# Patient Record
Sex: FEMALE | Race: OTHER | Employment: UNEMPLOYED | URBAN - METROPOLITAN AREA
[De-identification: names, ages, dates, MRNs, and addresses within clinical notes are randomized per-mention and may not be internally consistent; named-entity substitution may affect disease eponyms.]

---

## 2017-04-04 ENCOUNTER — HOSPITAL ENCOUNTER (OUTPATIENT)
Age: 27
Setting detail: SPECIMEN
Discharge: HOME | End: 2017-04-04
Payer: MEDICARE

## 2017-04-04 ENCOUNTER — OFFICE VISIT (OUTPATIENT)
Dept: OBGYN CLINIC | Age: 27
End: 2017-04-04
Payer: MEDICAID

## 2017-04-04 VITALS
HEIGHT: 61 IN | DIASTOLIC BLOOD PRESSURE: 82 MMHG | SYSTOLIC BLOOD PRESSURE: 123 MMHG | WEIGHT: 178 LBS | HEART RATE: 82 BPM | BODY MASS INDEX: 33.61 KG/M2

## 2017-04-04 DIAGNOSIS — Z32.01 POSITIVE BLOOD PREGNANCY TEST: ICD-10-CM

## 2017-04-04 DIAGNOSIS — N91.2 AMENORRHEA: ICD-10-CM

## 2017-04-04 DIAGNOSIS — N91.2 AMENORRHEA: Primary | ICD-10-CM

## 2017-04-04 LAB
ABO/RH: NORMAL
ABSOLUTE EOS #: 0.1 K/UL (ref 0–0.4)
ABSOLUTE LYMPH #: 2 K/UL (ref 1–4.8)
ABSOLUTE MONO #: 0.5 K/UL (ref 0.1–1.2)
AMPHETAMINE SCREEN URINE: NEGATIVE
ANTIBODY SCREEN: NEGATIVE
BARBITURATE SCREEN URINE: NEGATIVE
BASOPHILS # BLD: 0 % (ref 0–2)
BASOPHILS ABSOLUTE: 0 K/UL (ref 0–0.2)
BENZODIAZEPINE SCREEN, URINE: NEGATIVE
BILIRUBIN URINE: NEGATIVE
BUPRENORPHINE URINE: NORMAL
CANNABINOID SCREEN URINE: NEGATIVE
COCAINE METABOLITE, URINE: NEGATIVE
COLOR: YELLOW
COMMENT UA: NORMAL
DIFFERENTIAL TYPE: ABNORMAL
DIRECT EXAM: NORMAL
EOSINOPHILS RELATIVE PERCENT: 1 % (ref 1–4)
GLUCOSE URINE: NEGATIVE
HCT VFR BLD CALC: 34 % (ref 36–46)
HEMOGLOBIN: 11.1 G/DL (ref 12–16)
HEPATITIS B SURFACE ANTIGEN: NONREACTIVE
HIV AG/AB: NONREACTIVE
KETONES, URINE: NEGATIVE
LEUKOCYTE ESTERASE, URINE: NEGATIVE
LYMPHOCYTES # BLD: 24 % (ref 24–44)
Lab: NORMAL
MCH RBC QN AUTO: 25 PG (ref 26–34)
MCHC RBC AUTO-ENTMCNC: 32.6 G/DL (ref 31–37)
MCV RBC AUTO: 76.6 FL (ref 80–100)
MDMA URINE: NORMAL
METHADONE SCREEN, URINE: NEGATIVE
METHAMPHETAMINE, URINE: NORMAL
MONOCYTES # BLD: 6 % (ref 2–11)
NITRITE, URINE: NEGATIVE
OPIATES, URINE: NEGATIVE
OXYCODONE SCREEN URINE: NEGATIVE
PDW BLD-RTO: 16.9 % (ref 12.5–15.4)
PH UA: 7 (ref 5–8)
PHENCYCLIDINE, URINE: NEGATIVE
PLATELET # BLD: 298 K/UL (ref 140–450)
PLATELET ESTIMATE: ABNORMAL
PMV BLD AUTO: 8.3 FL (ref 6–12)
PROPOXYPHENE, URINE: NORMAL
PROTEIN UA: NEGATIVE
RBC # BLD: 4.44 M/UL (ref 4–5.2)
RBC # BLD: ABNORMAL 10*6/UL
RUBV IGG SER QL: 130.9 IU/ML
SEG NEUTROPHILS: 69 % (ref 36–66)
SEGMENTED NEUTROPHILS ABSOLUTE COUNT: 5.7 K/UL (ref 1.8–7.7)
SPECIFIC GRAVITY UA: 1.02 (ref 1–1.03)
SPECIMEN DESCRIPTION: NORMAL
STATUS: NORMAL
T. PALLIDUM, IGG: NONREACTIVE
TEST INFORMATION: NORMAL
TRICYCLIC ANTIDEPRESSANTS, UR: NORMAL
TURBIDITY: CLEAR
URINE HGB: NEGATIVE
UROBILINOGEN, URINE: NORMAL
WBC # BLD: 8.3 K/UL (ref 3.5–11)
WBC # BLD: ABNORMAL 10*3/UL

## 2017-04-04 PROCEDURE — 99213 OFFICE O/P EST LOW 20 MIN: CPT | Performed by: OBSTETRICS & GYNECOLOGY

## 2017-04-04 RX ORDER — METFORMIN HYDROCHLORIDE 500 MG/1
TABLET, EXTENDED RELEASE ORAL
COMMUNITY
Start: 2017-02-28 | End: 2017-04-04 | Stop reason: ALTCHOICE

## 2017-04-04 RX ORDER — LETROZOLE 2.5 MG/1
TABLET, FILM COATED ORAL
COMMUNITY
Start: 2017-02-28 | End: 2017-04-04 | Stop reason: ALTCHOICE

## 2017-04-04 RX ORDER — PROMETHAZINE HYDROCHLORIDE 12.5 MG/1
12.5 TABLET ORAL EVERY 6 HOURS PRN
Qty: 30 TABLET | Refills: 0 | Status: SHIPPED | OUTPATIENT
Start: 2017-04-04 | End: 2017-11-02

## 2017-04-04 RX ORDER — MELATONIN
COMMUNITY
Start: 2017-02-28

## 2017-04-04 RX ORDER — PRENATAL VIT W/ FE FUMARATE-FA TAB 28-1 MG 28 MG-1 MG
TAB ORAL
COMMUNITY
Start: 2017-03-14

## 2017-04-04 RX ORDER — ACETAMINOPHEN 500 MG
1000 TABLET ORAL EVERY 6 HOURS PRN
Qty: 60 TABLET | Refills: 1 | Status: SHIPPED | OUTPATIENT
Start: 2017-04-04 | End: 2022-09-13 | Stop reason: ALTCHOICE

## 2017-04-05 ENCOUNTER — TELEPHONE (OUTPATIENT)
Dept: OBGYN CLINIC | Age: 27
End: 2017-04-05

## 2017-04-05 DIAGNOSIS — N30.00 ACUTE CYSTITIS WITHOUT HEMATURIA: Primary | ICD-10-CM

## 2017-04-05 RX ORDER — NITROFURANTOIN 25; 75 MG/1; MG/1
100 CAPSULE ORAL 2 TIMES DAILY
Qty: 10 CAPSULE | Refills: 0 | Status: SHIPPED | OUTPATIENT
Start: 2017-04-05 | End: 2017-04-12

## 2017-04-06 LAB
C TRACH DNA GENITAL QL NAA+PROBE: NEGATIVE
CULTURE: ABNORMAL
CYTOLOGY REPORT: NORMAL
Lab: ABNORMAL
N. GONORRHOEAE DNA: NEGATIVE
ORGANISM: ABNORMAL
ORGANISM: ABNORMAL
SPECIMEN DESCRIPTION: ABNORMAL
STATUS: ABNORMAL

## 2017-04-13 ENCOUNTER — HOSPITAL ENCOUNTER (OUTPATIENT)
Dept: ULTRASOUND IMAGING | Age: 27
Discharge: HOME | End: 2017-04-13
Payer: MEDICARE

## 2017-04-13 DIAGNOSIS — N91.2 AMENORRHEA: ICD-10-CM

## 2017-04-13 DIAGNOSIS — Z32.01 POSITIVE BLOOD PREGNANCY TEST: ICD-10-CM

## 2017-04-13 PROCEDURE — 76801 OB US < 14 WKS SINGLE FETUS: CPT

## 2017-04-25 ENCOUNTER — INITIAL PRENATAL (OUTPATIENT)
Dept: OBGYN CLINIC | Age: 27
End: 2017-04-25
Payer: MEDICARE

## 2017-04-25 ENCOUNTER — HOSPITAL ENCOUNTER (OUTPATIENT)
Age: 27
Setting detail: SPECIMEN
Discharge: HOME | End: 2017-04-25
Payer: MEDICARE

## 2017-04-25 ENCOUNTER — TELEPHONE (OUTPATIENT)
Dept: FAMILY MEDICINE CLINIC | Age: 27
End: 2017-04-25

## 2017-04-25 VITALS
BODY MASS INDEX: 31.91 KG/M2 | HEART RATE: 98 BPM | WEIGHT: 169 LBS | SYSTOLIC BLOOD PRESSURE: 121 MMHG | DIASTOLIC BLOOD PRESSURE: 77 MMHG

## 2017-04-25 DIAGNOSIS — Z34.01 ENCOUNTER FOR SUPERVISION OF NORMAL FIRST PREGNANCY IN FIRST TRIMESTER: Primary | ICD-10-CM

## 2017-04-25 DIAGNOSIS — Z83.3 FAMILY HISTORY OF DIABETES MELLITUS TYPE II: ICD-10-CM

## 2017-04-25 DIAGNOSIS — Z98.891 HISTORY OF CESAREAN SECTION, LOW TRANSVERSE: ICD-10-CM

## 2017-04-25 DIAGNOSIS — O31.10X0 VANISHING TWIN SYNDROME: ICD-10-CM

## 2017-04-25 DIAGNOSIS — O99.810 ABNORMAL GLUCOSE TOLERANCE TEST (GTT) DURING PREGNANCY, ANTEPARTUM: Primary | ICD-10-CM

## 2017-04-25 LAB
GLUCOSE ADMINISTRATION: ABNORMAL
GLUCOSE TOLERANCE SCREEN 50G: 188 MG/DL (ref 70–135)

## 2017-04-25 PROCEDURE — 99213 OFFICE O/P EST LOW 20 MIN: CPT | Performed by: OBSTETRICS & GYNECOLOGY

## 2017-04-25 PROCEDURE — H1000 PRENATAL CARE ATRISK ASSESSM: HCPCS | Performed by: OBSTETRICS & GYNECOLOGY

## 2017-04-27 LAB
AFP INTERPRETATION: NORMAL
AFP MOM: NORMAL
AFP SPECIMEN: NORMAL
AFP: 14 NG/ML
DATE OF BIRTH: NORMAL
DATING METHOD: NORMAL
DETERMINED BY: NORMAL
DIABETIC: NO
DUE DATE: NORMAL
ESTIMATED DUE DATE: NORMAL
FAMILY HISTORY NTD: NORMAL
GESTATIONAL AGE: 10.71 WEEKS
HISTORY OF ANEUPLOIDY?: NO
INSULIN REQ DIABETES: NO
LAST MENSTRUAL PERIOD: NORMAL
MATERNAL AGE AT EDD: 29.7 YR
MATERNAL WEIGHT: 169
NUMBER OF FETUSES: NORMAL
PATIENT WEIGHT: 169 LBS
PHYSICIAN: NORMAL
RACE (MATERNAL): NORMAL
RACE: NORMAL
ZZ NTE CLEAN UP: HISTORY: NORMAL

## 2017-05-02 ENCOUNTER — ROUTINE PRENATAL (OUTPATIENT)
Dept: PERINATAL CARE | Age: 27
End: 2017-05-02
Payer: MEDICARE

## 2017-05-02 VITALS
HEART RATE: 92 BPM | WEIGHT: 168 LBS | BODY MASS INDEX: 31.72 KG/M2 | RESPIRATION RATE: 16 BRPM | DIASTOLIC BLOOD PRESSURE: 74 MMHG | TEMPERATURE: 97.9 F | SYSTOLIC BLOOD PRESSURE: 140 MMHG

## 2017-05-02 DIAGNOSIS — O99.211 OBESITY COMPLICATING PREGNANCY, FIRST TRIMESTER: ICD-10-CM

## 2017-05-02 DIAGNOSIS — O36.80X0 EXAMINATION TO DETERMINE FETAL VIABILITY OF PREGNANCY, NOT APPLICABLE OR UNSPECIFIED FETUS: ICD-10-CM

## 2017-05-02 DIAGNOSIS — Z36.9 FIRST TRIMESTER SCREENING: Primary | ICD-10-CM

## 2017-05-02 DIAGNOSIS — O34.219 PREVIOUS CESAREAN DELIVERY, ANTEPARTUM CONDITION OR COMPLICATION: ICD-10-CM

## 2017-05-02 DIAGNOSIS — O99.810 ABNORMAL MATERNAL GLUCOSE TOLERANCE, ANTEPARTUM: Primary | ICD-10-CM

## 2017-05-02 DIAGNOSIS — Z3A.11 11 WEEKS GESTATION OF PREGNANCY: ICD-10-CM

## 2017-05-02 DIAGNOSIS — O24.119 PRE-EXISTING TYPE 2 DIABETES MELLITUS DURING PREGNANCY, ANTEPARTUM: ICD-10-CM

## 2017-05-02 PROBLEM — O99.210 OBESITY COMPLICATING PREGNANCY: Status: ACTIVE | Noted: 2017-05-02

## 2017-05-02 PROCEDURE — 76813 OB US NUCHAL MEAS 1 GEST: CPT | Performed by: OBSTETRICS & GYNECOLOGY

## 2017-05-02 PROCEDURE — 76801 OB US < 14 WKS SINGLE FETUS: CPT | Performed by: OBSTETRICS & GYNECOLOGY

## 2017-05-03 ENCOUNTER — TELEPHONE (OUTPATIENT)
Dept: OBGYN CLINIC | Age: 27
End: 2017-05-03

## 2017-05-10 ENCOUNTER — TELEPHONE (OUTPATIENT)
Dept: OBGYN CLINIC | Age: 27
End: 2017-05-10

## 2017-05-10 ENCOUNTER — HOSPITAL ENCOUNTER (OUTPATIENT)
Age: 27
Setting detail: SPECIMEN
Discharge: HOME | End: 2017-05-10
Payer: MEDICARE

## 2017-05-10 DIAGNOSIS — O99.810 ABNORMAL GLUCOSE TOLERANCE TEST (GTT) DURING PREGNANCY, ANTEPARTUM: ICD-10-CM

## 2017-05-10 LAB
AMOUNT GLUCOSE GIVEN: 100 G
GLUCOSE FASTING: 95 MG/DL (ref 65–99)
GLUCOSE TOLERANCE TEST 1 HOUR: 240 MG/DL (ref 65–184)
GLUCOSE TOLERANCE TEST 2 HOUR: 184 MG/DL (ref 65–139)
GLUCOSE TOLERANCE TEST 3 HOUR: 154 MG/DL (ref 65–130)

## 2017-05-12 ENCOUNTER — TELEPHONE (OUTPATIENT)
Dept: OBGYN CLINIC | Age: 27
End: 2017-05-12

## 2017-05-19 ENCOUNTER — ROUTINE PRENATAL (OUTPATIENT)
Dept: OBGYN CLINIC | Age: 27
End: 2017-05-19
Payer: MEDICARE

## 2017-05-19 VITALS
BODY MASS INDEX: 31.34 KG/M2 | DIASTOLIC BLOOD PRESSURE: 68 MMHG | HEART RATE: 90 BPM | WEIGHT: 166 LBS | SYSTOLIC BLOOD PRESSURE: 104 MMHG

## 2017-05-19 DIAGNOSIS — Z3A.14 14 WEEKS GESTATION OF PREGNANCY: Primary | ICD-10-CM

## 2017-05-19 PROCEDURE — 99213 OFFICE O/P EST LOW 20 MIN: CPT | Performed by: OBSTETRICS & GYNECOLOGY

## 2017-05-19 RX ORDER — FLUCONAZOLE 150 MG/1
150 TABLET ORAL ONCE
Qty: 1 TABLET | Refills: 0 | Status: SHIPPED | OUTPATIENT
Start: 2017-05-19 | End: 2017-05-19

## 2017-05-23 ENCOUNTER — TELEPHONE (OUTPATIENT)
Dept: OBGYN CLINIC | Age: 27
End: 2017-05-23

## 2017-05-23 RX ORDER — FLUCONAZOLE 150 MG/1
150 TABLET ORAL
Qty: 3 TABLET | Refills: 0 | Status: SHIPPED | OUTPATIENT
Start: 2017-05-23 | End: 2017-06-16 | Stop reason: ALTCHOICE

## 2017-05-25 ENCOUNTER — HOSPITAL ENCOUNTER (OUTPATIENT)
Age: 27
Discharge: HOME | End: 2017-05-25
Payer: MEDICARE

## 2017-05-25 LAB
BUN BLDV-MCNC: 4 MG/DL (ref 6–20)
CREAT SERPL-MCNC: 0.36 MG/DL (ref 0.5–0.9)
CREATININE TIMED UR: NORMAL MG/ X H
CREATININE URINE: 30.3 MG/DL
CREATININE, 24H UR: 813 MG/24 H (ref 740–1570)
ESTIMATED AVERAGE GLUCOSE: 108 MG/DL
GFR AFRICAN AMERICAN: >60 ML/MIN
GFR NON-AFRICAN AMERICAN: >60 ML/MIN
GFR SERPL CREATININE-BSD FRML MDRD: ABNORMAL ML/MIN/{1.73_M2}
GFR SERPL CREATININE-BSD FRML MDRD: ABNORMAL ML/MIN/{1.73_M2}
HBA1C MFR BLD: 5.4 % (ref 4–6)
HOURS COLLECTED: 24 H
HOURS COLLECTED: 24 H
PROTEIN 24 HOUR URINE: 376 MG/24 H
PROTEIN,TOT TIMED UR: ABNORMAL MG/X H
URINE TOTAL PROTEIN: 14 MG/DL
VOLUME: 2684 ML
VOLUME: 2684 ML

## 2017-05-25 PROCEDURE — 84520 ASSAY OF UREA NITROGEN: CPT

## 2017-05-25 PROCEDURE — 84156 ASSAY OF PROTEIN URINE: CPT

## 2017-05-25 PROCEDURE — 82570 ASSAY OF URINE CREATININE: CPT

## 2017-05-25 PROCEDURE — 82575 CREATININE CLEARANCE TEST: CPT

## 2017-05-25 PROCEDURE — 36415 COLL VENOUS BLD VENIPUNCTURE: CPT

## 2017-05-25 PROCEDURE — 83036 HEMOGLOBIN GLYCOSYLATED A1C: CPT

## 2017-05-25 PROCEDURE — 82565 ASSAY OF CREATININE: CPT

## 2017-05-26 LAB
CREAT SERPL-MCNC: 0.33 MG/DL (ref 0.5–0.9)
CREATININE CLEARANCE: 178.9 ML/MIN/BSA (ref 71–151)
CREATININE URINE: 31.8 MG/DL (ref 28–217)
LENGTH OF COLLECTION: 24 H
PATIENT HEIGHT: 152 CM
PATIENT WEIGHT: 77 KG
VOLUME: 2684 ML

## 2017-06-05 ENCOUNTER — ROUTINE PRENATAL (OUTPATIENT)
Dept: PERINATAL CARE | Age: 27
End: 2017-06-05
Payer: MEDICARE

## 2017-06-05 VITALS
RESPIRATION RATE: 18 BRPM | SYSTOLIC BLOOD PRESSURE: 109 MMHG | TEMPERATURE: 98.2 F | HEART RATE: 95 BPM | WEIGHT: 164 LBS | BODY MASS INDEX: 30.96 KG/M2 | DIASTOLIC BLOOD PRESSURE: 68 MMHG

## 2017-06-05 DIAGNOSIS — O24.119 PRE-EXISTING TYPE 2 DIABETES MELLITUS DURING PREGNANCY, ANTEPARTUM: Primary | ICD-10-CM

## 2017-06-05 DIAGNOSIS — Z3A.16 16 WEEKS GESTATION OF PREGNANCY: ICD-10-CM

## 2017-06-05 PROCEDURE — 99242 OFF/OP CONSLTJ NEW/EST SF 20: CPT | Performed by: OBSTETRICS & GYNECOLOGY

## 2017-06-05 RX ORDER — ASPIRIN 81 MG/1
81 TABLET, CHEWABLE ORAL DAILY
COMMUNITY
End: 2017-11-02

## 2017-06-13 ENCOUNTER — TELEPHONE (OUTPATIENT)
Dept: PERINATAL CARE | Age: 27
End: 2017-06-13

## 2017-06-16 ENCOUNTER — ROUTINE PRENATAL (OUTPATIENT)
Dept: OBGYN CLINIC | Age: 27
End: 2017-06-16
Payer: MEDICARE

## 2017-06-16 VITALS
SYSTOLIC BLOOD PRESSURE: 99 MMHG | WEIGHT: 167 LBS | DIASTOLIC BLOOD PRESSURE: 67 MMHG | HEART RATE: 98 BPM | BODY MASS INDEX: 31.53 KG/M2

## 2017-06-16 DIAGNOSIS — O24.119 PRE-EXISTING TYPE 2 DIABETES MELLITUS DURING PREGNANCY, ANTEPARTUM: Primary | ICD-10-CM

## 2017-06-16 DIAGNOSIS — Z34.82 ENCOUNTER FOR SUPERVISION OF OTHER NORMAL PREGNANCY IN SECOND TRIMESTER: ICD-10-CM

## 2017-06-16 DIAGNOSIS — R14.1 GAS PAIN: ICD-10-CM

## 2017-06-16 PROCEDURE — 99213 OFFICE O/P EST LOW 20 MIN: CPT | Performed by: OBSTETRICS & GYNECOLOGY

## 2017-06-16 RX ORDER — SIMETHICONE 80 MG
80 TABLET,CHEWABLE ORAL 2 TIMES DAILY PRN
Qty: 180 TABLET | Refills: 0 | Status: SHIPPED | OUTPATIENT
Start: 2017-06-16 | End: 2017-09-29 | Stop reason: SDUPTHER

## 2017-06-22 ENCOUNTER — HOSPITAL ENCOUNTER (OUTPATIENT)
Dept: DIABETES SERVICES | Age: 27
Setting detail: THERAPIES SERIES
Discharge: HOME | End: 2017-06-22
Payer: MEDICARE

## 2017-06-22 VITALS — BODY MASS INDEX: 30.97 KG/M2 | WEIGHT: 164.02 LBS

## 2017-06-22 DIAGNOSIS — O24.410 GESTATIONAL DIABETES MELLITUS, CLASS A1: Primary | ICD-10-CM

## 2017-06-22 PROCEDURE — G0108 DIAB MANAGE TRN  PER INDIV: HCPCS

## 2017-07-05 ENCOUNTER — TELEPHONE (OUTPATIENT)
Dept: PERINATAL CARE | Age: 27
End: 2017-07-05

## 2017-07-06 ENCOUNTER — TELEPHONE (OUTPATIENT)
Dept: PERINATAL CARE | Age: 27
End: 2017-07-06

## 2017-07-18 ENCOUNTER — ROUTINE PRENATAL (OUTPATIENT)
Dept: OBGYN CLINIC | Age: 27
End: 2017-07-18
Payer: MEDICARE

## 2017-07-18 ENCOUNTER — TELEPHONE (OUTPATIENT)
Dept: PERINATAL CARE | Age: 27
End: 2017-07-18

## 2017-07-18 ENCOUNTER — ROUTINE PRENATAL (OUTPATIENT)
Dept: PERINATAL CARE | Age: 27
End: 2017-07-18
Payer: MEDICARE

## 2017-07-18 VITALS
WEIGHT: 165 LBS | DIASTOLIC BLOOD PRESSURE: 60 MMHG | HEART RATE: 94 BPM | BODY MASS INDEX: 31.15 KG/M2 | SYSTOLIC BLOOD PRESSURE: 112 MMHG

## 2017-07-18 VITALS
WEIGHT: 165 LBS | RESPIRATION RATE: 16 BRPM | TEMPERATURE: 98.3 F | BODY MASS INDEX: 31.15 KG/M2 | DIASTOLIC BLOOD PRESSURE: 68 MMHG | HEART RATE: 92 BPM | SYSTOLIC BLOOD PRESSURE: 114 MMHG

## 2017-07-18 DIAGNOSIS — Z3A.22 22 WEEKS GESTATION OF PREGNANCY: ICD-10-CM

## 2017-07-18 DIAGNOSIS — O34.219 PREVIOUS CESAREAN DELIVERY, ANTEPARTUM CONDITION OR COMPLICATION: ICD-10-CM

## 2017-07-18 DIAGNOSIS — O41.8X20: ICD-10-CM

## 2017-07-18 DIAGNOSIS — O24.119 PRE-EXISTING TYPE 2 DIABETES MELLITUS DURING PREGNANCY, ANTEPARTUM: Primary | ICD-10-CM

## 2017-07-18 DIAGNOSIS — O99.212 OBESITY COMPLICATING PREGNANCY, SECOND TRIMESTER: ICD-10-CM

## 2017-07-18 DIAGNOSIS — Z3A.22 22 WEEKS GESTATION OF PREGNANCY: Primary | ICD-10-CM

## 2017-07-18 DIAGNOSIS — Z36.86 ENCOUNTER FOR SCREENING FOR RISK OF PRE-TERM LABOR: ICD-10-CM

## 2017-07-18 PROBLEM — O41.8X90 SUBCHORIONIC HEMATOMA: Status: ACTIVE | Noted: 2017-07-18

## 2017-07-18 PROBLEM — O46.8X9 SUBCHORIONIC HEMATOMA: Status: ACTIVE | Noted: 2017-07-18

## 2017-07-18 PROCEDURE — 76817 TRANSVAGINAL US OBSTETRIC: CPT | Performed by: OBSTETRICS & GYNECOLOGY

## 2017-07-18 PROCEDURE — 76811 OB US DETAILED SNGL FETUS: CPT | Performed by: OBSTETRICS & GYNECOLOGY

## 2017-07-18 PROCEDURE — 99213 OFFICE O/P EST LOW 20 MIN: CPT | Performed by: OBSTETRICS & GYNECOLOGY

## 2017-07-21 ENCOUNTER — TELEPHONE (OUTPATIENT)
Dept: OBGYN CLINIC | Age: 27
End: 2017-07-21

## 2017-07-21 DIAGNOSIS — B37.31 YEAST INFECTION OF THE VAGINA: Primary | ICD-10-CM

## 2017-07-25 ENCOUNTER — HOSPITAL ENCOUNTER (OUTPATIENT)
Dept: DIABETES SERVICES | Age: 27
Setting detail: THERAPIES SERIES
Discharge: HOME | End: 2017-07-25
Payer: MEDICARE

## 2017-07-25 DIAGNOSIS — O24.410 GESTATIONAL DIABETES MELLITUS, CLASS A1: Primary | ICD-10-CM

## 2017-07-25 PROCEDURE — G0108 DIAB MANAGE TRN  PER INDIV: HCPCS

## 2017-08-02 ENCOUNTER — TELEPHONE (OUTPATIENT)
Dept: FAMILY MEDICINE CLINIC | Age: 27
End: 2017-08-02

## 2017-08-02 DIAGNOSIS — B37.31 YEAST INFECTION OF THE VAGINA: Primary | ICD-10-CM

## 2017-08-02 RX ORDER — FLUCONAZOLE 150 MG/1
150 TABLET ORAL ONCE
Qty: 1 TABLET | Refills: 0 | Status: SHIPPED | OUTPATIENT
Start: 2017-08-02 | End: 2017-08-02

## 2017-08-15 ENCOUNTER — ROUTINE PRENATAL (OUTPATIENT)
Dept: PERINATAL CARE | Age: 27
End: 2017-08-15
Payer: MEDICARE

## 2017-08-15 VITALS
HEIGHT: 61 IN | HEART RATE: 87 BPM | WEIGHT: 168 LBS | SYSTOLIC BLOOD PRESSURE: 107 MMHG | DIASTOLIC BLOOD PRESSURE: 65 MMHG | TEMPERATURE: 98.1 F | BODY MASS INDEX: 31.72 KG/M2 | RESPIRATION RATE: 16 BRPM

## 2017-08-15 DIAGNOSIS — O41.8X20: ICD-10-CM

## 2017-08-15 DIAGNOSIS — O99.212 OBESITY COMPLICATING PREGNANCY, SECOND TRIMESTER: ICD-10-CM

## 2017-08-15 DIAGNOSIS — O24.119 PRE-EXISTING TYPE 2 DIABETES MELLITUS DURING PREGNANCY, ANTEPARTUM: Primary | ICD-10-CM

## 2017-08-15 DIAGNOSIS — Z36.4 ANTENATAL SCREENING FOR FETAL GROWTH RETARDATION USING ULTRASONICS: ICD-10-CM

## 2017-08-15 DIAGNOSIS — O34.219 PREVIOUS CESAREAN DELIVERY, ANTEPARTUM CONDITION OR COMPLICATION: ICD-10-CM

## 2017-08-15 DIAGNOSIS — Z3A.26 26 WEEKS GESTATION OF PREGNANCY: ICD-10-CM

## 2017-08-15 PROCEDURE — 76816 OB US FOLLOW-UP PER FETUS: CPT | Performed by: OBSTETRICS & GYNECOLOGY

## 2017-08-15 PROCEDURE — 76827 ECHO EXAM OF FETAL HEART: CPT | Performed by: OBSTETRICS & GYNECOLOGY

## 2017-08-15 PROCEDURE — 76825 ECHO EXAM OF FETAL HEART: CPT | Performed by: OBSTETRICS & GYNECOLOGY

## 2017-08-15 PROCEDURE — 93325 DOPPLER ECHO COLOR FLOW MAPG: CPT | Performed by: OBSTETRICS & GYNECOLOGY

## 2017-08-22 ENCOUNTER — ROUTINE PRENATAL (OUTPATIENT)
Dept: OBGYN CLINIC | Age: 27
End: 2017-08-22
Payer: MEDICARE

## 2017-08-22 VITALS
SYSTOLIC BLOOD PRESSURE: 102 MMHG | DIASTOLIC BLOOD PRESSURE: 67 MMHG | BODY MASS INDEX: 31.55 KG/M2 | HEART RATE: 88 BPM | WEIGHT: 167 LBS

## 2017-08-22 DIAGNOSIS — Z34.92 PRENATAL CARE, SECOND TRIMESTER: Primary | ICD-10-CM

## 2017-08-22 PROCEDURE — 99213 OFFICE O/P EST LOW 20 MIN: CPT | Performed by: OBSTETRICS & GYNECOLOGY

## 2017-08-23 ENCOUNTER — HOSPITAL ENCOUNTER (INPATIENT)
Age: 27
LOS: 1 days | Discharge: HOME | DRG: 566 | End: 2017-08-26
Attending: OBSTETRICS & GYNECOLOGY | Admitting: OBSTETRICS & GYNECOLOGY
Payer: MEDICARE

## 2017-08-23 ENCOUNTER — APPOINTMENT (OUTPATIENT)
Dept: ULTRASOUND IMAGING | Age: 27
DRG: 566 | End: 2017-08-23
Payer: MEDICARE

## 2017-08-23 ENCOUNTER — TELEPHONE (OUTPATIENT)
Dept: OBGYN CLINIC | Age: 27
End: 2017-08-23

## 2017-08-23 PROBLEM — R10.9 LEFT FLANK PAIN: Status: ACTIVE | Noted: 2017-08-23

## 2017-08-23 PROBLEM — O09.92 HIGH-RISK PREGNANCY IN SECOND TRIMESTER: Status: ACTIVE | Noted: 2017-08-23

## 2017-08-23 PROBLEM — R50.9 FEBRILE: Status: ACTIVE | Noted: 2017-08-23

## 2017-08-23 LAB
-: ABNORMAL
ABSOLUTE EOS #: 0 K/UL (ref 0–0.4)
ABSOLUTE LYMPH #: 1 K/UL (ref 1–4.8)
ABSOLUTE MONO #: 0.9 K/UL (ref 0.1–1.2)
ALBUMIN SERPL-MCNC: 3.8 G/DL (ref 3.5–5.2)
ALBUMIN/GLOBULIN RATIO: 1.1 (ref 1–2.5)
ALP BLD-CCNC: 75 U/L (ref 35–104)
ALT SERPL-CCNC: 8 U/L (ref 5–33)
AMORPHOUS: ABNORMAL
ANION GAP SERPL CALCULATED.3IONS-SCNC: 19 MMOL/L (ref 9–17)
AST SERPL-CCNC: 10 U/L
BACTERIA: ABNORMAL
BASOPHILS # BLD: 0 %
BASOPHILS ABSOLUTE: 0 K/UL (ref 0–0.2)
BILIRUB SERPL-MCNC: 0.31 MG/DL (ref 0.3–1.2)
BILIRUBIN URINE: NEGATIVE
BUN BLDV-MCNC: 4 MG/DL (ref 6–20)
BUN/CREAT BLD: ABNORMAL (ref 9–20)
CALCIUM SERPL-MCNC: 9.1 MG/DL (ref 8.6–10.4)
CASTS UA: ABNORMAL /LPF (ref 0–8)
CHLORIDE BLD-SCNC: 99 MMOL/L (ref 98–107)
CO2: 22 MMOL/L (ref 20–31)
COLOR: YELLOW
COMMENT UA: ABNORMAL
CREAT SERPL-MCNC: 0.31 MG/DL (ref 0.5–0.9)
CRYSTALS, UA: ABNORMAL /HPF
DIFFERENTIAL TYPE: ABNORMAL
EOSINOPHILS RELATIVE PERCENT: 0 %
EPITHELIAL CELLS UA: ABNORMAL /HPF (ref 0–5)
GFR AFRICAN AMERICAN: >60 ML/MIN
GFR NON-AFRICAN AMERICAN: >60 ML/MIN
GFR SERPL CREATININE-BSD FRML MDRD: ABNORMAL ML/MIN/{1.73_M2}
GFR SERPL CREATININE-BSD FRML MDRD: ABNORMAL ML/MIN/{1.73_M2}
GLUCOSE BLD-MCNC: 142 MG/DL (ref 65–105)
GLUCOSE BLD-MCNC: 78 MG/DL (ref 70–99)
GLUCOSE URINE: NEGATIVE
HCT VFR BLD CALC: 31.1 % (ref 36–46)
HEMOGLOBIN: 10.4 G/DL (ref 12–16)
KETONES, URINE: NEGATIVE
LEUKOCYTE ESTERASE, URINE: ABNORMAL
LYMPHOCYTES # BLD: 8 %
MCH RBC QN AUTO: 26.5 PG (ref 26–34)
MCHC RBC AUTO-ENTMCNC: 33.3 G/DL (ref 31–37)
MCV RBC AUTO: 79.6 FL (ref 80–100)
MONOCYTES # BLD: 7 %
MUCUS: ABNORMAL
NITRITE, URINE: NEGATIVE
OTHER OBSERVATIONS UA: ABNORMAL
PDW BLD-RTO: 15.2 % (ref 12.5–15.4)
PH UA: 7.5 (ref 5–8)
PLATELET # BLD: 220 K/UL (ref 140–450)
PLATELET ESTIMATE: ABNORMAL
PMV BLD AUTO: 7.5 FL (ref 6–12)
POTASSIUM SERPL-SCNC: 3.6 MMOL/L (ref 3.7–5.3)
PROTEIN UA: NEGATIVE
RBC # BLD: 3.91 M/UL (ref 4–5.2)
RBC # BLD: ABNORMAL 10*6/UL
RBC UA: ABNORMAL /HPF (ref 0–4)
RENAL EPITHELIAL, UA: ABNORMAL /HPF
SEG NEUTROPHILS: 85 %
SEGMENTED NEUTROPHILS ABSOLUTE COUNT: 11.3 K/UL (ref 1.8–7.7)
SODIUM BLD-SCNC: 140 MMOL/L (ref 135–144)
SPECIFIC GRAVITY UA: 1.01 (ref 1–1.03)
TOTAL PROTEIN: 7.2 G/DL (ref 6.4–8.3)
TRICHOMONAS: ABNORMAL
TURBIDITY: CLEAR
URINE HGB: ABNORMAL
UROBILINOGEN, URINE: NORMAL
WBC # BLD: 13.2 K/UL (ref 3.5–11)
WBC # BLD: ABNORMAL 10*3/UL
WBC UA: ABNORMAL /HPF (ref 0–5)
YEAST: ABNORMAL

## 2017-08-23 PROCEDURE — 96365 THER/PROPH/DIAG IV INF INIT: CPT

## 2017-08-23 PROCEDURE — 81001 URINALYSIS AUTO W/SCOPE: CPT

## 2017-08-23 PROCEDURE — 87086 URINE CULTURE/COLONY COUNT: CPT

## 2017-08-23 PROCEDURE — 87088 URINE BACTERIA CULTURE: CPT

## 2017-08-23 PROCEDURE — 2580000003 HC RX 258: Performed by: STUDENT IN AN ORGANIZED HEALTH CARE EDUCATION/TRAINING PROGRAM

## 2017-08-23 PROCEDURE — 87040 BLOOD CULTURE FOR BACTERIA: CPT

## 2017-08-23 PROCEDURE — 82947 ASSAY GLUCOSE BLOOD QUANT: CPT

## 2017-08-23 PROCEDURE — 36415 COLL VENOUS BLD VENIPUNCTURE: CPT

## 2017-08-23 PROCEDURE — 6370000000 HC RX 637 (ALT 250 FOR IP): Performed by: STUDENT IN AN ORGANIZED HEALTH CARE EDUCATION/TRAINING PROGRAM

## 2017-08-23 PROCEDURE — 76770 US EXAM ABDO BACK WALL COMP: CPT

## 2017-08-23 PROCEDURE — 6360000002 HC RX W HCPCS: Performed by: STUDENT IN AN ORGANIZED HEALTH CARE EDUCATION/TRAINING PROGRAM

## 2017-08-23 PROCEDURE — 80053 COMPREHEN METABOLIC PANEL: CPT

## 2017-08-23 PROCEDURE — 87186 SC STD MICRODIL/AGAR DIL: CPT

## 2017-08-23 PROCEDURE — 85025 COMPLETE CBC W/AUTO DIFF WBC: CPT

## 2017-08-23 RX ORDER — NICOTINE POLACRILEX 4 MG
15 LOZENGE BUCCAL PRN
Status: DISCONTINUED | OUTPATIENT
Start: 2017-08-23 | End: 2017-08-26 | Stop reason: HOSPADM

## 2017-08-23 RX ORDER — 0.9 % SODIUM CHLORIDE 0.9 %
1000 INTRAVENOUS SOLUTION INTRAVENOUS ONCE
Status: COMPLETED | OUTPATIENT
Start: 2017-08-23 | End: 2017-08-23

## 2017-08-23 RX ORDER — ACETAMINOPHEN 500 MG
1000 TABLET ORAL ONCE
Status: COMPLETED | OUTPATIENT
Start: 2017-08-23 | End: 2017-08-23

## 2017-08-23 RX ORDER — SODIUM CHLORIDE 9 MG/ML
125 INJECTION, SOLUTION INTRAVENOUS CONTINUOUS
Status: DISCONTINUED | OUTPATIENT
Start: 2017-08-23 | End: 2017-08-24

## 2017-08-23 RX ORDER — DEXTROSE MONOHYDRATE 50 MG/ML
100 INJECTION, SOLUTION INTRAVENOUS PRN
Status: DISCONTINUED | OUTPATIENT
Start: 2017-08-23 | End: 2017-08-26 | Stop reason: HOSPADM

## 2017-08-23 RX ORDER — DEXTROSE MONOHYDRATE 25 G/50ML
12.5 INJECTION, SOLUTION INTRAVENOUS PRN
Status: DISCONTINUED | OUTPATIENT
Start: 2017-08-23 | End: 2017-08-26 | Stop reason: HOSPADM

## 2017-08-23 RX ADMIN — INSULIN HUMAN 12 UNITS: 100 INJECTION, SUSPENSION SUBCUTANEOUS at 21:04

## 2017-08-23 RX ADMIN — CEFTRIAXONE SODIUM 1 G: 1 INJECTION, POWDER, FOR SOLUTION INTRAMUSCULAR; INTRAVENOUS at 19:28

## 2017-08-23 RX ADMIN — SODIUM CHLORIDE 125 ML/HR: 9 INJECTION, SOLUTION INTRAVENOUS at 21:00

## 2017-08-23 RX ADMIN — ACETAMINOPHEN 1000 MG: 500 TABLET ORAL at 19:36

## 2017-08-23 RX ADMIN — SODIUM CHLORIDE 1000 ML: 9 INJECTION, SOLUTION INTRAVENOUS at 18:30

## 2017-08-23 ASSESSMENT — PAIN SCALES - GENERAL: PAINLEVEL_OUTOF10: 3

## 2017-08-23 NOTE — TELEPHONE ENCOUNTER
states that patient is having muscle cramps in her back. Very instense. Feels unable to breath normally while the cramping is occurring. Per Elodia Pereira. She is to go to [email or web address] L/d.

## 2017-08-23 NOTE — TELEPHONE ENCOUNTER
[address] patient refuses to go to hospital states she is not in labor but wants to know what to do I stressed several times she needs to be evaluated   [facility] called back and stated she would now to to ER

## 2017-08-24 ENCOUNTER — APPOINTMENT (OUTPATIENT)
Dept: GENERAL RADIOLOGY | Age: 27
DRG: 566 | End: 2017-08-24
Payer: MEDICARE

## 2017-08-24 LAB
ABSOLUTE EOS #: 0 K/UL (ref 0–0.4)
ABSOLUTE LYMPH #: 1 K/UL (ref 1–4.8)
ABSOLUTE MONO #: 1.2 K/UL (ref 0.1–1.2)
BASOPHILS # BLD: 0 %
BASOPHILS ABSOLUTE: 0 K/UL (ref 0–0.2)
C-REACTIVE PROTEIN: 125.2 MG/L (ref 0–5)
DIFFERENTIAL TYPE: ABNORMAL
EOSINOPHILS RELATIVE PERCENT: 0 %
GLUCOSE BLD-MCNC: 111 MG/DL (ref 65–105)
GLUCOSE BLD-MCNC: 79 MG/DL (ref 65–105)
GLUCOSE BLD-MCNC: 86 MG/DL (ref 65–105)
GLUCOSE BLD-MCNC: 88 MG/DL (ref 65–105)
GLUCOSE BLD-MCNC: 98 MG/DL (ref 65–105)
HCT VFR BLD CALC: 28.8 % (ref 36–46)
HEMOGLOBIN: 9.5 G/DL (ref 12–16)
LACTIC ACID, WHOLE BLOOD: 0.9 MMOL/L (ref 0.7–2.1)
LYMPHOCYTES # BLD: 7 %
MCH RBC QN AUTO: 26.6 PG (ref 26–34)
MCHC RBC AUTO-ENTMCNC: 33 G/DL (ref 31–37)
MCV RBC AUTO: 80.6 FL (ref 80–100)
MONOCYTES # BLD: 8 %
PDW BLD-RTO: 14.9 % (ref 12.5–15.4)
PLATELET # BLD: 186 K/UL (ref 140–450)
PLATELET ESTIMATE: ABNORMAL
PMV BLD AUTO: 8.1 FL (ref 6–12)
RBC # BLD: 3.57 M/UL (ref 4–5.2)
RBC # BLD: ABNORMAL 10*6/UL
SEG NEUTROPHILS: 85 %
SEGMENTED NEUTROPHILS ABSOLUTE COUNT: 12.2 K/UL (ref 1.8–7.7)
WBC # BLD: 14.4 K/UL (ref 3.5–11)
WBC # BLD: ABNORMAL 10*3/UL

## 2017-08-24 PROCEDURE — 71010 XR CHEST PORTABLE: CPT

## 2017-08-24 PROCEDURE — 2580000003 HC RX 258: Performed by: STUDENT IN AN ORGANIZED HEALTH CARE EDUCATION/TRAINING PROGRAM

## 2017-08-24 PROCEDURE — 6370000000 HC RX 637 (ALT 250 FOR IP): Performed by: STUDENT IN AN ORGANIZED HEALTH CARE EDUCATION/TRAINING PROGRAM

## 2017-08-24 PROCEDURE — 82947 ASSAY GLUCOSE BLOOD QUANT: CPT

## 2017-08-24 PROCEDURE — 2580000003 HC RX 258: Performed by: INTERNAL MEDICINE

## 2017-08-24 PROCEDURE — 83605 ASSAY OF LACTIC ACID: CPT

## 2017-08-24 PROCEDURE — 99222 1ST HOSP IP/OBS MODERATE 55: CPT | Performed by: OBSTETRICS & GYNECOLOGY

## 2017-08-24 PROCEDURE — 86140 C-REACTIVE PROTEIN: CPT

## 2017-08-24 PROCEDURE — 2580000003 HC RX 258: Performed by: OBSTETRICS & GYNECOLOGY

## 2017-08-24 PROCEDURE — 85025 COMPLETE CBC W/AUTO DIFF WBC: CPT

## 2017-08-24 PROCEDURE — G0378 HOSPITAL OBSERVATION PER HR: HCPCS

## 2017-08-24 PROCEDURE — 36415 COLL VENOUS BLD VENIPUNCTURE: CPT

## 2017-08-24 PROCEDURE — 6360000002 HC RX W HCPCS: Performed by: INTERNAL MEDICINE

## 2017-08-24 PROCEDURE — 96366 THER/PROPH/DIAG IV INF ADDON: CPT

## 2017-08-24 PROCEDURE — 96367 TX/PROPH/DG ADDL SEQ IV INF: CPT

## 2017-08-24 RX ORDER — SODIUM CHLORIDE 9 MG/ML
INJECTION, SOLUTION INTRAVENOUS CONTINUOUS
Status: DISCONTINUED | OUTPATIENT
Start: 2017-08-24 | End: 2017-08-26 | Stop reason: HOSPADM

## 2017-08-24 RX ORDER — 0.9 % SODIUM CHLORIDE 0.9 %
1000 INTRAVENOUS SOLUTION INTRAVENOUS ONCE
Status: COMPLETED | OUTPATIENT
Start: 2017-08-24 | End: 2017-08-24

## 2017-08-24 RX ORDER — ACETAMINOPHEN 500 MG
1000 TABLET ORAL EVERY 6 HOURS PRN
Status: DISCONTINUED | OUTPATIENT
Start: 2017-08-24 | End: 2017-08-26 | Stop reason: HOSPADM

## 2017-08-24 RX ADMIN — MEROPENEM 1 G: 1 INJECTION, POWDER, FOR SOLUTION INTRAVENOUS at 22:15

## 2017-08-24 RX ADMIN — ACETAMINOPHEN 1000 MG: 500 TABLET ORAL at 01:28

## 2017-08-24 RX ADMIN — ACETAMINOPHEN 1000 MG: 500 TABLET ORAL at 14:43

## 2017-08-24 RX ADMIN — SODIUM CHLORIDE: 9 INJECTION, SOLUTION INTRAVENOUS at 17:24

## 2017-08-24 RX ADMIN — SODIUM CHLORIDE: 9 INJECTION, SOLUTION INTRAVENOUS at 12:32

## 2017-08-24 RX ADMIN — ACETAMINOPHEN 1000 MG: 500 TABLET ORAL at 08:11

## 2017-08-24 RX ADMIN — MEROPENEM 1 G: 1 INJECTION, POWDER, FOR SOLUTION INTRAVENOUS at 14:43

## 2017-08-24 RX ADMIN — SODIUM CHLORIDE 1000 ML: 9 INJECTION, SOLUTION INTRAVENOUS at 09:32

## 2017-08-24 RX ADMIN — SODIUM CHLORIDE 1000 ML: 9 INJECTION, SOLUTION INTRAVENOUS at 03:06

## 2017-08-24 RX ADMIN — INSULIN LISPRO 4 UNITS: 100 INJECTION, SOLUTION INTRAVENOUS; SUBCUTANEOUS at 12:37

## 2017-08-24 RX ADMIN — ACETAMINOPHEN 1000 MG: 500 TABLET ORAL at 20:21

## 2017-08-24 RX ADMIN — INSULIN HUMAN 12 UNITS: 100 INJECTION, SUSPENSION SUBCUTANEOUS at 21:18

## 2017-08-24 ASSESSMENT — PAIN SCALES - GENERAL
PAINLEVEL_OUTOF10: 0
PAINLEVEL_OUTOF10: 0
PAINLEVEL_OUTOF10: 4
PAINLEVEL_OUTOF10: 3

## 2017-08-25 LAB
ABSOLUTE EOS #: 0 K/UL (ref 0–0.4)
ABSOLUTE LYMPH #: 1.5 K/UL (ref 1–4.8)
ABSOLUTE MONO #: 1.3 K/UL (ref 0.1–1.2)
BASOPHILS # BLD: 0 %
BASOPHILS ABSOLUTE: 0 K/UL (ref 0–0.2)
C-REACTIVE PROTEIN: 187.9 MG/L (ref 0–5)
CULTURE: ABNORMAL
CULTURE: ABNORMAL
DIFFERENTIAL TYPE: ABNORMAL
EOSINOPHILS RELATIVE PERCENT: 0 %
GLUCOSE BLD-MCNC: 71 MG/DL (ref 65–105)
GLUCOSE BLD-MCNC: 72 MG/DL (ref 65–105)
GLUCOSE BLD-MCNC: 79 MG/DL (ref 65–105)
GLUCOSE BLD-MCNC: 96 MG/DL (ref 65–105)
HCT VFR BLD CALC: 26.9 % (ref 36–46)
HEMOGLOBIN: 8.9 G/DL (ref 12–16)
LYMPHOCYTES # BLD: 10 %
Lab: ABNORMAL
MCH RBC QN AUTO: 26.6 PG (ref 26–34)
MCHC RBC AUTO-ENTMCNC: 33.2 G/DL (ref 31–37)
MCV RBC AUTO: 80.3 FL (ref 80–100)
MONOCYTES # BLD: 9 %
ORGANISM: ABNORMAL
PDW BLD-RTO: 15.4 % (ref 12.5–15.4)
PLATELET # BLD: 191 K/UL (ref 140–450)
PLATELET ESTIMATE: ABNORMAL
PMV BLD AUTO: 8 FL (ref 6–12)
RBC # BLD: 3.35 M/UL (ref 4–5.2)
RBC # BLD: ABNORMAL 10*6/UL
SEG NEUTROPHILS: 81 %
SEGMENTED NEUTROPHILS ABSOLUTE COUNT: 12.1 K/UL (ref 1.8–7.7)
SPECIMEN DESCRIPTION: ABNORMAL
STATUS: ABNORMAL
WBC # BLD: 14.9 K/UL (ref 3.5–11)
WBC # BLD: ABNORMAL 10*3/UL

## 2017-08-25 PROCEDURE — 2580000003 HC RX 258: Performed by: OBSTETRICS & GYNECOLOGY

## 2017-08-25 PROCEDURE — 86140 C-REACTIVE PROTEIN: CPT

## 2017-08-25 PROCEDURE — 6370000000 HC RX 637 (ALT 250 FOR IP): Performed by: STUDENT IN AN ORGANIZED HEALTH CARE EDUCATION/TRAINING PROGRAM

## 2017-08-25 PROCEDURE — 85025 COMPLETE CBC W/AUTO DIFF WBC: CPT

## 2017-08-25 PROCEDURE — 36415 COLL VENOUS BLD VENIPUNCTURE: CPT

## 2017-08-25 PROCEDURE — 6360000002 HC RX W HCPCS: Performed by: OBSTETRICS & GYNECOLOGY

## 2017-08-25 PROCEDURE — 82947 ASSAY GLUCOSE BLOOD QUANT: CPT

## 2017-08-25 PROCEDURE — 2580000003 HC RX 258: Performed by: STUDENT IN AN ORGANIZED HEALTH CARE EDUCATION/TRAINING PROGRAM

## 2017-08-25 PROCEDURE — 1220000000 HC SEMI PRIVATE OB R&B

## 2017-08-25 PROCEDURE — 2580000003 HC RX 258: Performed by: INTERNAL MEDICINE

## 2017-08-25 PROCEDURE — 6370000000 HC RX 637 (ALT 250 FOR IP): Performed by: OBSTETRICS & GYNECOLOGY

## 2017-08-25 PROCEDURE — 6360000002 HC RX W HCPCS: Performed by: INTERNAL MEDICINE

## 2017-08-25 RX ORDER — DOCUSATE SODIUM 100 MG/1
100 CAPSULE, LIQUID FILLED ORAL DAILY
Status: DISCONTINUED | OUTPATIENT
Start: 2017-08-25 | End: 2017-08-26 | Stop reason: HOSPADM

## 2017-08-25 RX ORDER — LANOLIN ALCOHOL/MO/W.PET/CERES
325 CREAM (GRAM) TOPICAL 2 TIMES DAILY WITH MEALS
Status: DISCONTINUED | OUTPATIENT
Start: 2017-08-25 | End: 2017-08-26 | Stop reason: HOSPADM

## 2017-08-25 RX ORDER — DIPHENHYDRAMINE HYDROCHLORIDE, ZINC ACETATE 2; .1 G/100G; G/100G
CREAM TOPICAL 3 TIMES DAILY PRN
Status: DISCONTINUED | OUTPATIENT
Start: 2017-08-25 | End: 2017-08-26 | Stop reason: HOSPADM

## 2017-08-25 RX ADMIN — DOCUSATE SODIUM 100 MG: 100 CAPSULE ORAL at 09:39

## 2017-08-25 RX ADMIN — INSULIN LISPRO 4 UNITS: 100 INJECTION, SOLUTION INTRAVENOUS; SUBCUTANEOUS at 08:48

## 2017-08-25 RX ADMIN — INSULIN HUMAN 12 UNITS: 100 INJECTION, SUSPENSION SUBCUTANEOUS at 21:44

## 2017-08-25 RX ADMIN — MEROPENEM 1 G: 1 INJECTION, POWDER, FOR SOLUTION INTRAVENOUS at 06:22

## 2017-08-25 RX ADMIN — INSULIN LISPRO 4 UNITS: 100 INJECTION, SOLUTION INTRAVENOUS; SUBCUTANEOUS at 17:16

## 2017-08-25 RX ADMIN — SODIUM CHLORIDE: 9 INJECTION, SOLUTION INTRAVENOUS at 07:53

## 2017-08-25 RX ADMIN — CEFTRIAXONE SODIUM 1 G: 1 INJECTION, POWDER, FOR SOLUTION INTRAMUSCULAR; INTRAVENOUS at 13:30

## 2017-08-25 RX ADMIN — ACETAMINOPHEN 1000 MG: 500 TABLET ORAL at 17:25

## 2017-08-25 RX ADMIN — FERROUS SULFATE TAB EC 325 MG (65 MG FE EQUIVALENT) 325 MG: 325 (65 FE) TABLET DELAYED RESPONSE at 09:39

## 2017-08-25 RX ADMIN — ACETAMINOPHEN 1000 MG: 500 TABLET ORAL at 06:22

## 2017-08-25 RX ADMIN — FERROUS SULFATE TAB EC 325 MG (65 MG FE EQUIVALENT) 325 MG: 325 (65 FE) TABLET DELAYED RESPONSE at 17:18

## 2017-08-25 RX ADMIN — SODIUM CHLORIDE: 9 INJECTION, SOLUTION INTRAVENOUS at 17:15

## 2017-08-25 ASSESSMENT — PAIN SCALES - GENERAL
PAINLEVEL_OUTOF10: 0
PAINLEVEL_OUTOF10: 0

## 2017-08-26 VITALS
BODY MASS INDEX: 31.53 KG/M2 | DIASTOLIC BLOOD PRESSURE: 65 MMHG | WEIGHT: 167 LBS | TEMPERATURE: 97.8 F | SYSTOLIC BLOOD PRESSURE: 115 MMHG | RESPIRATION RATE: 16 BRPM | HEART RATE: 104 BPM | HEIGHT: 61 IN

## 2017-08-26 LAB
GLUCOSE BLD-MCNC: 73 MG/DL (ref 65–105)
GLUCOSE BLD-MCNC: 90 MG/DL (ref 65–105)

## 2017-08-26 PROCEDURE — 6370000000 HC RX 637 (ALT 250 FOR IP): Performed by: OBSTETRICS & GYNECOLOGY

## 2017-08-26 PROCEDURE — 82947 ASSAY GLUCOSE BLOOD QUANT: CPT

## 2017-08-26 PROCEDURE — 6360000002 HC RX W HCPCS: Performed by: OBSTETRICS & GYNECOLOGY

## 2017-08-26 PROCEDURE — 2580000003 HC RX 258: Performed by: OBSTETRICS & GYNECOLOGY

## 2017-08-26 PROCEDURE — 6370000000 HC RX 637 (ALT 250 FOR IP): Performed by: STUDENT IN AN ORGANIZED HEALTH CARE EDUCATION/TRAINING PROGRAM

## 2017-08-26 RX ORDER — GUAIFENESIN 100 MG/5ML
10 SYRUP ORAL EVERY 4 HOURS PRN
Qty: 1 BOTTLE | Refills: 3 | Status: SHIPPED | OUTPATIENT
Start: 2017-08-26 | End: 2017-09-22 | Stop reason: SDUPTHER

## 2017-08-26 RX ORDER — LANOLIN ALCOHOL/MO/W.PET/CERES
325 CREAM (GRAM) TOPICAL 2 TIMES DAILY WITH MEALS
Qty: 90 TABLET | Refills: 1 | Status: SHIPPED | OUTPATIENT
Start: 2017-08-26 | End: 2021-01-13

## 2017-08-26 RX ORDER — PSEUDOEPHEDRINE HCL 30 MG
100 TABLET ORAL DAILY PRN
Qty: 60 CAPSULE | Refills: 1 | Status: SHIPPED | OUTPATIENT
Start: 2017-08-26 | End: 2017-08-26

## 2017-08-26 RX ORDER — AMOXICILLIN 500 MG/1
500 CAPSULE ORAL 3 TIMES DAILY
Qty: 42 CAPSULE | Refills: 0 | Status: SHIPPED | OUTPATIENT
Start: 2017-08-26 | End: 2017-09-09

## 2017-08-26 RX ORDER — PSEUDOEPHEDRINE HCL 30 MG
100 TABLET ORAL DAILY PRN
Qty: 60 CAPSULE | Refills: 1 | Status: SHIPPED | OUTPATIENT
Start: 2017-08-26 | End: 2021-01-13

## 2017-08-26 RX ORDER — PSEUDOEPHEDRINE HCL 30 MG
100 TABLET ORAL DAILY
Qty: 60 CAPSULE | Refills: 1 | Status: SHIPPED | OUTPATIENT
Start: 2017-08-26 | End: 2017-08-26

## 2017-08-26 RX ADMIN — DOCUSATE SODIUM 100 MG: 100 CAPSULE ORAL at 09:30

## 2017-08-26 RX ADMIN — CEFTRIAXONE SODIUM 1 G: 1 INJECTION, POWDER, FOR SOLUTION INTRAMUSCULAR; INTRAVENOUS at 12:46

## 2017-08-26 RX ADMIN — FERROUS SULFATE TAB EC 325 MG (65 MG FE EQUIVALENT) 325 MG: 325 (65 FE) TABLET DELAYED RESPONSE at 09:30

## 2017-08-26 RX ADMIN — INSULIN LISPRO 4 UNITS: 100 INJECTION, SOLUTION INTRAVENOUS; SUBCUTANEOUS at 08:54

## 2017-08-29 LAB
CULTURE: NORMAL
Lab: NORMAL
Lab: NORMAL
SPECIMEN DESCRIPTION: NORMAL
SPECIMEN DESCRIPTION: NORMAL
STATUS: NORMAL
STATUS: NORMAL

## 2017-09-06 ENCOUNTER — ROUTINE PRENATAL (OUTPATIENT)
Dept: OBGYN CLINIC | Age: 27
End: 2017-09-06
Payer: MEDICARE

## 2017-09-06 VITALS
WEIGHT: 165 LBS | SYSTOLIC BLOOD PRESSURE: 134 MMHG | BODY MASS INDEX: 31.18 KG/M2 | HEART RATE: 93 BPM | DIASTOLIC BLOOD PRESSURE: 73 MMHG

## 2017-09-06 DIAGNOSIS — O23.03 PYELONEPHRITIS AFFECTING PREGNANCY IN THIRD TRIMESTER: ICD-10-CM

## 2017-09-06 PROCEDURE — 99213 OFFICE O/P EST LOW 20 MIN: CPT | Performed by: OBSTETRICS & GYNECOLOGY

## 2017-09-06 RX ORDER — DIPHENHYDRAMINE HCL 25 MG
25 CAPSULE ORAL EVERY 6 HOURS PRN
Qty: 30 CAPSULE | Refills: 0 | Status: SHIPPED | OUTPATIENT
Start: 2017-09-06 | End: 2017-09-16

## 2017-09-06 RX ORDER — NITROFURANTOIN 25; 75 MG/1; MG/1
100 CAPSULE ORAL DAILY
Qty: 30 CAPSULE | Refills: 2 | Status: SHIPPED | OUTPATIENT
Start: 2017-09-06 | End: 2017-09-16

## 2017-09-12 ENCOUNTER — TELEPHONE (OUTPATIENT)
Dept: PERINATAL CARE | Age: 27
End: 2017-09-12

## 2017-09-12 ENCOUNTER — ROUTINE PRENATAL (OUTPATIENT)
Dept: PERINATAL CARE | Age: 27
End: 2017-09-12
Payer: MEDICARE

## 2017-09-12 VITALS
TEMPERATURE: 98.5 F | WEIGHT: 169 LBS | SYSTOLIC BLOOD PRESSURE: 110 MMHG | BODY MASS INDEX: 31.93 KG/M2 | DIASTOLIC BLOOD PRESSURE: 70 MMHG | RESPIRATION RATE: 16 BRPM | HEART RATE: 92 BPM

## 2017-09-12 DIAGNOSIS — O24.119 PRE-EXISTING TYPE 2 DIABETES MELLITUS DURING PREGNANCY, ANTEPARTUM: Primary | ICD-10-CM

## 2017-09-12 DIAGNOSIS — Z3A.30 30 WEEKS GESTATION OF PREGNANCY: ICD-10-CM

## 2017-09-12 DIAGNOSIS — Z36.4 ANTENATAL SCREENING FOR FETAL GROWTH RETARDATION USING ULTRASONICS: ICD-10-CM

## 2017-09-12 DIAGNOSIS — Z13.89 ENCOUNTER FOR ROUTINE SCREENING FOR MALFORMATION USING ULTRASONICS: ICD-10-CM

## 2017-09-12 DIAGNOSIS — O41.8X30: ICD-10-CM

## 2017-09-12 PROCEDURE — 76805 OB US >/= 14 WKS SNGL FETUS: CPT | Performed by: OBSTETRICS & GYNECOLOGY

## 2017-09-12 PROCEDURE — 76819 FETAL BIOPHYS PROFIL W/O NST: CPT | Performed by: OBSTETRICS & GYNECOLOGY

## 2017-09-22 ENCOUNTER — ROUTINE PRENATAL (OUTPATIENT)
Dept: OBGYN CLINIC | Age: 27
End: 2017-09-22
Payer: MEDICARE

## 2017-09-22 VITALS
WEIGHT: 167 LBS | DIASTOLIC BLOOD PRESSURE: 71 MMHG | BODY MASS INDEX: 31.55 KG/M2 | HEART RATE: 95 BPM | SYSTOLIC BLOOD PRESSURE: 103 MMHG

## 2017-09-22 DIAGNOSIS — O23.03 PYELONEPHRITIS AFFECTING PREGNANCY IN THIRD TRIMESTER: ICD-10-CM

## 2017-09-22 DIAGNOSIS — O99.213 OBESITY COMPLICATING PREGNANCY, THIRD TRIMESTER: ICD-10-CM

## 2017-09-22 DIAGNOSIS — O24.119 PRE-EXISTING TYPE 2 DIABETES MELLITUS DURING PREGNANCY, ANTEPARTUM: Primary | ICD-10-CM

## 2017-09-22 PROCEDURE — 59025 FETAL NON-STRESS TEST: CPT | Performed by: OBSTETRICS & GYNECOLOGY

## 2017-09-22 RX ORDER — GUAIFENESIN 100 MG/5ML
10 SYRUP ORAL EVERY 4 HOURS PRN
Qty: 1 BOTTLE | Refills: 3 | Status: SHIPPED | OUTPATIENT
Start: 2017-09-22 | End: 2017-11-02

## 2017-09-26 ENCOUNTER — ROUTINE PRENATAL (OUTPATIENT)
Dept: OBGYN CLINIC | Age: 27
End: 2017-09-26
Payer: MEDICARE

## 2017-09-26 VITALS
DIASTOLIC BLOOD PRESSURE: 71 MMHG | WEIGHT: 169 LBS | SYSTOLIC BLOOD PRESSURE: 112 MMHG | BODY MASS INDEX: 31.93 KG/M2 | HEART RATE: 83 BPM

## 2017-09-26 DIAGNOSIS — O24.119 PRE-EXISTING TYPE 2 DIABETES MELLITUS DURING PREGNANCY, ANTEPARTUM: Primary | ICD-10-CM

## 2017-09-26 PROCEDURE — 76818 FETAL BIOPHYS PROFILE W/NST: CPT | Performed by: OBSTETRICS & GYNECOLOGY

## 2017-09-29 ENCOUNTER — ROUTINE PRENATAL (OUTPATIENT)
Dept: OBGYN CLINIC | Age: 27
End: 2017-09-29
Payer: MEDICARE

## 2017-09-29 VITALS
WEIGHT: 169 LBS | HEART RATE: 84 BPM | BODY MASS INDEX: 31.93 KG/M2 | DIASTOLIC BLOOD PRESSURE: 65 MMHG | SYSTOLIC BLOOD PRESSURE: 117 MMHG

## 2017-09-29 DIAGNOSIS — O24.119 PRE-EXISTING TYPE 2 DIABETES MELLITUS DURING PREGNANCY, ANTEPARTUM: Primary | ICD-10-CM

## 2017-09-29 PROCEDURE — 59025 FETAL NON-STRESS TEST: CPT | Performed by: OBSTETRICS & GYNECOLOGY

## 2017-09-29 RX ORDER — SIMETHICONE 80 MG
80 TABLET,CHEWABLE ORAL 2 TIMES DAILY PRN
Qty: 180 TABLET | Refills: 0 | Status: SHIPPED | OUTPATIENT
Start: 2017-09-29 | End: 2017-11-02

## 2017-10-03 ENCOUNTER — ROUTINE PRENATAL (OUTPATIENT)
Dept: OBGYN CLINIC | Age: 27
End: 2017-10-03
Payer: MEDICARE

## 2017-10-03 VITALS
HEART RATE: 84 BPM | SYSTOLIC BLOOD PRESSURE: 98 MMHG | WEIGHT: 168 LBS | DIASTOLIC BLOOD PRESSURE: 64 MMHG | BODY MASS INDEX: 31.74 KG/M2

## 2017-10-03 DIAGNOSIS — O24.119 PRE-EXISTING TYPE 2 DIABETES MELLITUS DURING PREGNANCY, ANTEPARTUM: Primary | ICD-10-CM

## 2017-10-03 PROCEDURE — 76818 FETAL BIOPHYS PROFILE W/NST: CPT | Performed by: OBSTETRICS & GYNECOLOGY

## 2017-10-03 NOTE — MR AVS SNAPSHOT
Status    98/64 84 168 lb (76.2 kg) 02/01/2017 31.74 kg/m2 Never Smoker      Additional Information about your Body Mass Index (BMI)           Your BMI as listed above is considered obese (30 or more). BMI is an estimate of body fat, calculated from your height and weight. The higher your BMI, the greater your risk of heart disease, high blood pressure, type 2 diabetes, stroke, gallstones, arthritis, sleep apnea, and certain cancers. BMI is not perfect. It may overestimate body fat in athletes and people who are more muscular. Even a small weight loss (between 5 and 10 percent of your current weight) by decreasing your calorie intake and becoming more physically active will help lower your risk of developing or worsening diseases associated with obesity.      Learn more at: mymxlogco.uk             Medications and Orders      Your Current Medications Are              simethicone (MYLICON) 80 MG chewable tablet Take 1 tablet by mouth 2 times daily as needed for Flatulence    guaiFENesin (ALTARUSSIN) 100 MG/5ML syrup Take 10 mLs by mouth every 4 hours as needed for Cough    Insulin Aspart (NOVOLOG FLEXPEN SC) Inject 4 Units into the skin 4 units at breakfast and 4 units  lunch    Insulin NPH Human, Isophane, (HUMULIN N KWIKPEN SC) Inject 12 Units into the skin nightly    ferrous sulfate 325 (65 Fe) MG EC tablet Take 1 tablet by mouth 2 times daily (with meals)    docusate (COLACE, DULCOLAX) 100 MG CAPS Take 100 mg by mouth daily as needed (constipation)    aspirin 81 MG chewable tablet Take 81 mg by mouth daily    Cholecalciferol (VITAMIN D3) 1000 UNITS TABS     Prenatal Vit-Fe Fumarate-FA (VOL-NIA) 28-1 MG TABS     promethazine (PHENERGAN) 12.5 MG tablet Take 1 tablet by mouth every 6 hours as needed for Nausea    acetaminophen (APAP EXTRA STRENGTH) 500 MG tablet Take 2 tablets by mouth every 6 hours as needed for Pain      Allergies           No Known Allergies Additional Information        Basic Information     Date Of Birth Sex Race Ethnicity Preferred Language    1990 Female Other Non-/Non  English      Problem List as of 10/3/2017  Date Reviewed: 2017                Pre-existing type 2 diabetes mellitus during pregnancy, antepartum    Pyelonephritis affecting pregnancy in third trimester    Febrile  @ 1740 (100.7)    Left flank pain    Rh+/RI/GBS unk    Subchorionic hematoma    Type 2 DM- Class B    Obesity complicating pregnancy    Previous  delivery, antepartum condition or complication    H/O C/S x3    Vanishing twin syndrome      Preventive Care        Date Due    Tetanus Combination Vaccine (1 - Tdap) 2007    Yearly Flu Vaccine (1) 2017    Pap Smear 2020            MyChart Signup           Cinegif allows you to send messages to your doctor, view your test results, renew your prescriptions, schedule appointments, view visit notes, and more. How Do I Sign Up? 1. In your Internet browser, go to [email or web address]  2. Click on the Sign Up Now link in the Sign In box. You will see the New Member Sign Up page. 3. Enter your Cinegif Access Code exactly as it appears below. You will not need to use this code after youve completed the sign-up process. If you do not sign up before the expiration date, you must request a new code. Cinegif Access Code: [email or web address]  Expires: 2017 10:44 AM    4. Enter your Social Security Number ([id number]) and Date of Birth (mm/dd/yyyy) as indicated and click Submit. You will be taken to the next sign-up page. 5. Create a Cinegif ID. This will be your Cinegif login ID and cannot be changed, so think of one that is secure and easy to remember. 6. Create a Cinegif password. You can change your password at any time. 7. Enter your Password Reset Question and Answer. This can be used at a later time if you forget your password.

## 2017-10-03 NOTE — PROGRESS NOTES
Jeanette Hi is a  @ 33w5d who presents for NST/BPP visit. She denies LOF, VB or Ctxs.  + FM. She denies any complaints. O:  Vitals:    10/03/17 1009   BP: 98/64   Pulse: 84     BPP:  10/10   2/2 tone   2/2 movement   2/2 breathing   2/2 fluid, KAL 12 cm   2/2 NST, 140, mod variability, accels, no decels.   Category 1 FHTs, reactive    FBS: 80-95  2 Hr PP:     A/P:  Patient Active Problem List   Diagnosis    H/O C/S x3    Vanishing twin syndrome    Type 2 DM- Class B    Obesity complicating pregnancy    Previous  delivery, antepartum condition or complication    Subchorionic hematoma    Febrile  @ 1740 (100.7)    Left flank pain    Rh+/RI/GBS unk    Pyelonephritis affecting pregnancy in third trimester    Pre-existing type 2 diabetes mellitus during pregnancy, antepartum   Repeat c/s scheduled for   Discussed s/sx that should prompt call to the office  Discussed kick counts  RTC for biweekly NSTs with BPP    Sheng Thompson MD

## 2017-10-06 ENCOUNTER — ROUTINE PRENATAL (OUTPATIENT)
Dept: OBGYN CLINIC | Age: 27
End: 2017-10-06
Payer: MEDICARE

## 2017-10-06 VITALS
HEART RATE: 92 BPM | SYSTOLIC BLOOD PRESSURE: 123 MMHG | BODY MASS INDEX: 31.93 KG/M2 | WEIGHT: 169 LBS | DIASTOLIC BLOOD PRESSURE: 74 MMHG

## 2017-10-06 DIAGNOSIS — O24.119 PRE-EXISTING TYPE 2 DIABETES MELLITUS DURING PREGNANCY, ANTEPARTUM: Primary | ICD-10-CM

## 2017-10-06 PROCEDURE — 59025 FETAL NON-STRESS TEST: CPT | Performed by: OBSTETRICS & GYNECOLOGY

## 2017-10-06 NOTE — MR AVS SNAPSHOT
After Visit Summary             Radha Solomon   10/6/2017 10:15 AM   Routine Prenatal    Description:  Female : 1990   Provider:  Mary Leigh MD   Department:  [facility]              Your Follow-Up and Future Appointments         Below is a list of your follow-up and future appointments. This may not be a complete list as you may have made appointments directly with providers that we are not aware of or your providers may have made some for you. Please call your providers to confirm appointments. It is important to keep your appointments. Please bring your current insurance card, photo ID, co-pay, and all medication bottles to your appointment. If self-pay, payment is expected at the time of service. Your To-Do List     Future Appointments Provider Department Dept Phone    10/9/2017 1:15 PM Mary Leigh MD [facility] - [place] [phone]    Patients are asked to arrive 15 minutes prior to scheduled appointment time to complete paper work. 10/12/2017 10:30 AM NURSE SCHEDULE, [facility] MATERNAL FETAL; ULTRASONOGRAPHER [address] [facility] [phone]    10/16/2017 11:15 AM Mary Leigh MD [facility] [phone]    Patients are asked to arrive 15 minutes prior to scheduled appointment time to complete paper work. Follow-Up    Return in about 1 week (around 10/13/2017).          Information from Your Visit        Department     Name Address Phone Fax    [facility] - [address] [place] [address] [phone]      You Were Seen for:         Comments    Pre-existing type 2 diabetes mellitus during pregnancy, antepartum   [[id number]]         Vital Signs     Blood Pressure Pulse Weight Last Menstrual Period Body Mass Index Smoking Status    123/74 92 169 lb (76.7 kg) 2017 31.93 kg/m2 Never Smoker Additional Information        Basic Information     Date Of Birth Sex Race Ethnicity Preferred Language    1990 Female Other Non-/Non  English      Problem List as of 10/6/2017  Date Reviewed: 2017                Pre-existing type 2 diabetes mellitus during pregnancy, antepartum    Pyelonephritis affecting pregnancy in third trimester    Febrile  @ 1740 (100.7)    Left flank pain    Rh+/RI/GBS unk    Subchorionic hematoma    Type 2 DM- Class B    Obesity complicating pregnancy    Previous  delivery, antepartum condition or complication    H/O C/S x3    Vanishing twin syndrome      Preventive Care        Date Due    Tetanus Combination Vaccine (1 - Tdap) 2007    Yearly Flu Vaccine (1) 2017    Pap Smear 2020            MyChart Signup           Voyando allows you to send messages to your doctor, view your test results, renew your prescriptions, schedule appointments, view visit notes, and more. How Do I Sign Up? 1. In your Internet browser, go to [email or web address]  2. Click on the Sign Up Now link in the Sign In box. You will see the New Member Sign Up page. 3. Enter your Voyando Access Code exactly as it appears below. You will not need to use this code after youve completed the sign-up process. If you do not sign up before the expiration date, you must request a new code. Voyando Access Code: [email or web address]  Expires: 2017 10:44 AM    4. Enter your Social Security Number ([id number]) and Date of Birth (mm/dd/yyyy) as indicated and click Submit. You will be taken to the next sign-up page. 5. Create a Voyando ID. This will be your Voyando login ID and cannot be changed, so think of one that is secure and easy to remember. 6. Create a Voyando password. You can change your password at any time. 7. Enter your Password Reset Question and Answer. This can be used at a later time if you forget your password. 8. Enter your e-mail address. You will receive e-mail notification when new information is available in [address]. 9. Click Sign Up. You can now view your medical record. Additional Information  If you have questions, please contact the physician practice where you receive care. Remember, Bitbondt is NOT to be used for urgent needs. For medical emergencies, dial 911. For questions regarding your Bitbondt account call [phone]. If you have a clinical question, please call your doctor's office.

## 2017-10-09 ENCOUNTER — ROUTINE PRENATAL (OUTPATIENT)
Dept: OBGYN CLINIC | Age: 27
End: 2017-10-09
Payer: MEDICARE

## 2017-10-09 VITALS
BODY MASS INDEX: 31.93 KG/M2 | SYSTOLIC BLOOD PRESSURE: 106 MMHG | DIASTOLIC BLOOD PRESSURE: 74 MMHG | HEART RATE: 86 BPM | WEIGHT: 169 LBS

## 2017-10-09 DIAGNOSIS — O24.119 PRE-EXISTING TYPE 2 DIABETES MELLITUS DURING PREGNANCY, ANTEPARTUM: Primary | ICD-10-CM

## 2017-10-09 PROCEDURE — 59025 FETAL NON-STRESS TEST: CPT | Performed by: OBSTETRICS & GYNECOLOGY

## 2017-10-12 ENCOUNTER — HOSPITAL ENCOUNTER (OUTPATIENT)
Age: 27
Discharge: HOME | End: 2017-10-12
Attending: OBSTETRICS & GYNECOLOGY | Admitting: OBSTETRICS & GYNECOLOGY
Payer: MEDICARE

## 2017-10-12 ENCOUNTER — ROUTINE PRENATAL (OUTPATIENT)
Dept: PERINATAL CARE | Age: 27
End: 2017-10-12
Payer: MEDICARE

## 2017-10-12 VITALS
HEART RATE: 90 BPM | SYSTOLIC BLOOD PRESSURE: 110 MMHG | DIASTOLIC BLOOD PRESSURE: 62 MMHG | WEIGHT: 169 LBS | RESPIRATION RATE: 16 BRPM | TEMPERATURE: 98.3 F | BODY MASS INDEX: 31.93 KG/M2

## 2017-10-12 VITALS
DIASTOLIC BLOOD PRESSURE: 66 MMHG | HEART RATE: 84 BPM | SYSTOLIC BLOOD PRESSURE: 129 MMHG | RESPIRATION RATE: 17 BRPM | TEMPERATURE: 98.2 F

## 2017-10-12 DIAGNOSIS — O26.13 LOW MATERNAL WEIGHT GAIN, THIRD TRIMESTER: ICD-10-CM

## 2017-10-12 DIAGNOSIS — O24.119 PRE-EXISTING TYPE 2 DIABETES MELLITUS DURING PREGNANCY, ANTEPARTUM: Primary | ICD-10-CM

## 2017-10-12 DIAGNOSIS — O34.219 PREVIOUS CESAREAN DELIVERY, ANTEPARTUM CONDITION OR COMPLICATION: ICD-10-CM

## 2017-10-12 DIAGNOSIS — Z3A.35 35 WEEKS GESTATION OF PREGNANCY: ICD-10-CM

## 2017-10-12 DIAGNOSIS — Z36.4 ANTENATAL SCREENING FOR FETAL GROWTH RETARDATION USING ULTRASONICS: ICD-10-CM

## 2017-10-12 DIAGNOSIS — O36.8390 VARIABLE FETAL HEART RATE DECELERATIONS, ANTEPARTUM: ICD-10-CM

## 2017-10-12 PROBLEM — O26.10 LOW MATERNAL WEIGHT GAIN: Status: ACTIVE | Noted: 2017-10-12

## 2017-10-12 PROBLEM — O09.93 HIGH-RISK PREGNANCY IN THIRD TRIMESTER: Status: ACTIVE | Noted: 2017-10-12

## 2017-10-12 PROBLEM — O09.93 HIGH-RISK PREGNANCY IN THIRD TRIMESTER: Status: RESOLVED | Noted: 2017-10-12 | Resolved: 2017-10-12

## 2017-10-12 PROCEDURE — 76821 MIDDLE CEREBRAL ARTERY ECHO: CPT | Performed by: OBSTETRICS & GYNECOLOGY

## 2017-10-12 PROCEDURE — 76820 UMBILICAL ARTERY ECHO: CPT | Performed by: OBSTETRICS & GYNECOLOGY

## 2017-10-12 PROCEDURE — 76816 OB US FOLLOW-UP PER FETUS: CPT | Performed by: OBSTETRICS & GYNECOLOGY

## 2017-10-12 PROCEDURE — 99213 OFFICE O/P EST LOW 20 MIN: CPT

## 2017-10-12 PROCEDURE — 76818 FETAL BIOPHYS PROFILE W/NST: CPT | Performed by: OBSTETRICS & GYNECOLOGY

## 2017-10-12 RX ORDER — ACETAMINOPHEN 500 MG
1000 TABLET ORAL EVERY 4 HOURS PRN
Status: DISCONTINUED | OUTPATIENT
Start: 2017-10-12 | End: 2017-10-12 | Stop reason: HOSPADM

## 2017-10-12 NOTE — PROGRESS NOTES
Pt did not give a urine specimen at the time of vitals. , [facility], present.

## 2017-10-12 NOTE — H&P
This Pregnancy:  no     REVIEW OF SYSTEMS:   Constitutional: negative fever, negative chills  HEENT: negative visual disturbances, negative headaches  Respiratory: negative dyspnea, negative cough  Cardiovascular: negative chest pain,  negative palpitations  Gastrointestinal: negative abdominal pain, negative RUQ pain, negative N/V, negative diarrhea, negative constipation  Genitourinary: negative dysuria, negative vaginal discharge  Dermatological: negative rash  Hematologic: negative bruising  Immunologic/Lymphatic: negative recent illness, negative recent sick contact  Musculoskeletal: negative back pain, negative myalgias, negative arthralgias  Neurological:  negative dizziness, negative weakness  Behavior/Psych: negative depression, negative anxiety      OBSTETRICAL HISTORY:   Obstetric History       T3      L3     SAB0   TAB0   Ectopic0   Molar0   Multiple0   Live Births0       # Outcome Date GA Lbr Salty/2nd Weight Sex Delivery Anes PTL Lv   4 Current            3 Term      CS-Unspec      2 Term      CS-Unspec      1 Term      CS-Unspec             PAST MEDICAL HISTORY:   has no past medical history on file. PAST SURGICAL HISTORY:   has a past surgical history that includes Tonsillectomy and  section. ALLERGIES:  has No Known Allergies. MEDICATIONS:  Prior to Admission medications    Medication Sig Start Date End Date Taking?  Authorizing Provider   acetaminophen (APAP EXTRA STRENGTH) 500 MG tablet Take 2 tablets by mouth every 6 hours as needed for Pain 17  Yes Anila Gonzalez MD   simethicone (MYLICON) 80 MG chewable tablet Take 1 tablet by mouth 2 times daily as needed for Flatulence 17   Anila Gonzalez MD   guaiFENesin (ALTARUSSIN) 100 MG/5ML syrup Take 10 mLs by mouth every 4 hours as needed for Cough 17   Anila Gonzalez MD   Insulin Aspart (NOVOLOG FLEXPEN SC) Inject 4 Units into the skin 4 units at breakfast and 4 units  lunch    Historical applicable): not applicable (allopathic attending)        [facility] scan : Cephalic, anterior, BPP 8/8, KAL 10cm, EFW 5#5, UADs and MCA wnl. Prenatal Labs  Blood Type/Rh: A positive  Antibody Screen: negative  Hemoglobin, Hematocrit, Platelets: 77.5 / 90.3 / 298  Rubella: immune  T. Pallidum, IgG: non-reactive   Hepatitis B Surface Antigen: non-reactive   HIV: negative   Sickle Cell Screen: not done  Gonorrhea: negative  Chlamydia: negative  Urine culture: positive, E coli date: 17    1hr GTT: 188 mg/dL    Glucose Fastin  1 hour Glucose Tolerance Test: 240  2 hour Glucose Tolerance Test: 184  3 hour Glucose Tolerance Test: 154     Group B Strep:  not yet done    Cystic Fibrosis Screen: not available  First Trimester Screen: low risk  MSAFP/Multiple Markers: normal  Non-Invasive Prenatal Testing: not available  Anatomy US: normal, 3vc, female, anatomy WNL      ASSESSMENT & PLAN:  Leopold Nordmann is a 32 y.o. female  at 35w0d sent from [facility] for extended monitoring for variables on NST   - GBS unknown / Rh positive / R immune   - No indication for GBS prophylaxis at this time   - CEFM/TOCO   - TOCO: no contractions   - Continue extended monitoring for a few hours    - Discussed plan of care with patient and her  via     Pregestational DM   - Diagnosed by early 1 hr GGT this pregnancy   - 12 U NPH nightly and 4 Units Novolog Breakfast/Lunch   - Her insulin regimen is Novolog 4 units before breakfast and 4 units before lunch: NPH qhs 12 units.   Her fasting sugars are usually less than 90 and her 2 hr PP are usually less than 120    Hx of C/S x 3     Subchorionic hematoma   - Subchorionic hematoma (6.33 x 4.83 x .55cm)     Hx of Pyelonephritis affecting pregnancy in third trimester   - Afebrile, no complaint of CVA tenderness   - Physical exam benign    Low maternal weight gain    Obesity   - BMI 31.9    Patient Active Problem List    Diagnosis Date Noted    Low maternal weight gain 10/12/2017    Variable fetal heart rate decelerations, antepartum 10/12/2017    Pre-existing type 2 diabetes mellitus during pregnancy, antepartum 2017    Hx of Pyelonephritis affecting pregnancy in third trimester      Needs suppressive therapy      Febrile  @ 1740 (100.7) 2017    Left flank pain 2017    Rh+/RI/GBS unk 2017     Prenatal Labs  Blood Type/Rh: A positive  Antibody Screen: negative  Hemoglobin, Hematocrit, Platelets: 65.4 / 36.8 / 298  Rubella: immune  T. Pallidum, IgG: non-reactive   Hepatitis B Surface Antigen: non-reactive   HIV: negative   Sickle Cell Screen: not done  Gonorrhea: negative  Chlamydia: negative  Urine culture: positive, E coli date: 17    1hr GTT: 188 mg/dL    Glucose Fastin  1 hour Glucose Tolerance Test: 240  2 hour Glucose Tolerance Test: 184  3 hour Glucose Tolerance Test: 154     Group B Strep:  not yet done    Cystic Fibrosis Screen: not available  First Trimester Screen: low risk  MSAFP/Multiple Markers: normal  Non-Invasive Prenatal Testing: not available  Anatomy US: normal, 3vc, female, anatomy WNL      Subchorionic hematoma 2017    Type 2 DM- Class B 2017     Biweekly NSTs with weekly BPP  Growth q 3-4 wks  4 u novalog with breakfast and lunch, 12 u HS NPH      Obesity complicating pregnancy     Previous  delivery, antepartum condition or complication     H/O C/S x3 04/25/2017     x3        Vanishing twin syndrome 2017       Plan discussed with Dr. Alexis Lopez, who is agreeable. Steroids given this admission: No    Risks, benefits, alternatives and possible complications have been discussed in detail with the patient. Admission, and post admission procedures and expectations were discussed in detail. All questions were answered.     Attending's Name: Dr. Maki Zuniga  Ob/Gyn Resident  10/12/2017, 3:44 PM

## 2017-10-12 NOTE — PROGRESS NOTES
MFM OFFICE VISIT   Blood sugars reviewed (adequate glycemic control). Insulin regimen: SQ Novolog 4 units before breakfast and 4 units before lunch: NPH qhs 12 units. Patient sent to labor and delivery for extended fetal monitoring for 2-4 hours (please refer to ultrasound report). If discharged home, follow up in 3 weeks for interval fetal growth, non-stress test, glycemic review, and biophysical profile. Advise fetal  surveillance for duration of pregnancy with non-stress twice weekly and weekly evaluation of amniotic fluid volume and biophysical profile testing (given above medical/obstetric complications of pregnancy): can be done in primary OB provider office. Kick counts,  labor, and preeclamptic precautions reviewed. Continue current management and care. Please continue to send blood glucose monitoring information to [facility] office so that insulin and/or dietary changes can be made if necessary weekly. Continue recommended ADA diet therapy for diabetes. Compliance with regular prenatal care and blood sugar monitoring strongly encouraged. Strongly advised patient to be compliant with blood sugar monitoring as previously advised to minimize the potential of adverse  outcomes (e.g. fetal demise/stillbirth, congenital birth/heart defects, polyhydramnios/oligohydramnios, fetal growth abnormalities, pregnancy loss, hypertensive disorders of pregnancy, indicated  delivery, etc.), potential maternal morbidities, etc.     Above findings discussed with patient and her  today via an  (Ms. Brandi Estrada). Patient is amenable to the plan of care. Covering obstetrics attending physician (Dr. Elton Merrill) informed of the above findings and plan of care appropriately coordinated today.

## 2017-10-14 ENCOUNTER — HOSPITAL ENCOUNTER (OUTPATIENT)
Age: 27
Discharge: HOME | End: 2017-10-14
Attending: OBSTETRICS & GYNECOLOGY | Admitting: OBSTETRICS & GYNECOLOGY
Payer: MEDICARE

## 2017-10-14 VITALS
SYSTOLIC BLOOD PRESSURE: 118 MMHG | TEMPERATURE: 97.9 F | DIASTOLIC BLOOD PRESSURE: 75 MMHG | RESPIRATION RATE: 18 BRPM | HEART RATE: 95 BPM

## 2017-10-14 LAB
-: NORMAL
AMORPHOUS: NORMAL
BACTERIA: NORMAL
BILIRUBIN URINE: NEGATIVE
CASTS UA: NORMAL /LPF (ref 0–8)
COLOR: YELLOW
COMMENT UA: ABNORMAL
CRYSTALS, UA: NORMAL /HPF
EPITHELIAL CELLS UA: NORMAL /HPF (ref 0–5)
GLUCOSE URINE: NEGATIVE
KETONES, URINE: ABNORMAL
LEUKOCYTE ESTERASE, URINE: ABNORMAL
MUCUS: NORMAL
NITRITE, URINE: NEGATIVE
OTHER OBSERVATIONS UA: NORMAL
PH UA: 6.5 (ref 5–8)
PROTEIN UA: NEGATIVE
RBC UA: NORMAL /HPF (ref 0–4)
RENAL EPITHELIAL, UA: NORMAL /HPF
SPECIFIC GRAVITY UA: 1.01 (ref 1–1.03)
TRICHOMONAS: NORMAL
TURBIDITY: CLEAR
URINE HGB: NEGATIVE
UROBILINOGEN, URINE: NORMAL
WBC UA: NORMAL /HPF (ref 0–5)
YEAST: NORMAL

## 2017-10-14 PROCEDURE — 99213 OFFICE O/P EST LOW 20 MIN: CPT

## 2017-10-14 PROCEDURE — 6360000002 HC RX W HCPCS: Performed by: STUDENT IN AN ORGANIZED HEALTH CARE EDUCATION/TRAINING PROGRAM

## 2017-10-14 PROCEDURE — 81001 URINALYSIS AUTO W/SCOPE: CPT

## 2017-10-14 PROCEDURE — 6370000000 HC RX 637 (ALT 250 FOR IP): Performed by: STUDENT IN AN ORGANIZED HEALTH CARE EDUCATION/TRAINING PROGRAM

## 2017-10-14 PROCEDURE — 87086 URINE CULTURE/COLONY COUNT: CPT

## 2017-10-14 RX ORDER — ACETAMINOPHEN AND CODEINE PHOSPHATE 300; 30 MG/1; MG/1
1 TABLET ORAL EVERY 6 HOURS PRN
Qty: 15 TABLET | Refills: 0 | Status: SHIPPED | OUTPATIENT
Start: 2017-10-14 | End: 2021-02-21

## 2017-10-14 RX ORDER — SODIUM CHLORIDE 0.9 % (FLUSH) 0.9 %
10 SYRINGE (ML) INJECTION PRN
Status: DISCONTINUED | OUTPATIENT
Start: 2017-10-14 | End: 2017-10-14 | Stop reason: HOSPADM

## 2017-10-14 RX ORDER — SODIUM CHLORIDE 0.9 % (FLUSH) 0.9 %
10 SYRINGE (ML) INJECTION EVERY 12 HOURS SCHEDULED
Status: DISCONTINUED | OUTPATIENT
Start: 2017-10-14 | End: 2017-10-14 | Stop reason: HOSPADM

## 2017-10-14 RX ORDER — ACETAMINOPHEN AND CODEINE PHOSPHATE 300; 30 MG/1; MG/1
1 TABLET ORAL EVERY 4 HOURS PRN
Status: DISCONTINUED | OUTPATIENT
Start: 2017-10-14 | End: 2017-10-14 | Stop reason: HOSPADM

## 2017-10-14 RX ORDER — CEFTRIAXONE SODIUM 250 MG/1
250 INJECTION, POWDER, FOR SOLUTION INTRAMUSCULAR; INTRAVENOUS ONCE
Status: COMPLETED | OUTPATIENT
Start: 2017-10-14 | End: 2017-10-14

## 2017-10-14 RX ADMIN — CEFTRIAXONE SODIUM 250 MG: 250 INJECTION, POWDER, FOR SOLUTION INTRAMUSCULAR; INTRAVENOUS at 17:46

## 2017-10-14 RX ADMIN — ACETAMINOPHEN AND CODEINE PHOSPHATE 1 TABLET: 300; 30 TABLET ORAL at 17:10

## 2017-10-14 ASSESSMENT — PAIN SCALES - GENERAL: PAINLEVEL_OUTOF10: 6

## 2017-10-14 NOTE — FLOWSHEET NOTE
Discharge instructions and PTL precautions given to pt, pt verbalizes understanding and leaves ambulatory with family member.

## 2017-10-14 NOTE — H&P
OBSTETRICAL HISTORY Charli Woo    Date: 10/14/2017       Time: 3:23 PM   Patient Name: Marizol Evangelista     Patient : 1990  Room/Bed: [place] [address]    Admission Date/Time: 10/14/2017  2:59 PM      CC: left sided flank pain     HPI: Marizol Evangelista is a 32 y.o.  at 35w2d who presents with left sided pain that began yesterday morning. The pain is very sharp and radiates from her left flank to her left groin. She had one episode of vomiting this morning but has been tolerating food and liquids since the incident. She tried tylenol without any relief. The patient denies any burning with urination or blood in her urine. She has not had any fevers or chills since the pain began. The patient has been taking Marcobid 100mg once a day for the last month. The patient reports fetal movement is present, denies contractions, denies loss of fluid, denies vaginal bleeding. Pregnancy is complicated by Type 2 Diabetes Mellitus Class B. Her  Insulin regimen is 4 Units of Novolog with breakfast and lunch and 12 NPH at night (FBS has been 80-90, B; , L 110's D ). Patient also has a history of 3 previous C/S, obesity, and subchorionic hematoma. DATING:  LMP: Patient's last menstrual period was 2017.   Estimated Date of Delivery: 17   Based on: early ultrasound, at 5 0/7 weeks GA    PREGNANCY RISK FACTORS:  Patient Active Problem List   Diagnosis    H/O C/S x3    Vanishing twin syndrome    Type 2 DM- Class B    Obesity complicating pregnancy    Previous  delivery, antepartum condition or complication    Subchorionic hematoma    Febrile  @ 1740 (100.7)    Left flank pain    Rh+/RI/GBS unk    Hx of Pyelonephritis affecting pregnancy in third trimester    Pre-existing type 2 diabetes mellitus during pregnancy, antepartum    Low maternal weight gain    Variable fetal heart rate decelerations, antepartum      Steroids Given In This Pregnancy:  no     REVIEW OF SYSTEMS:  Constitutional: negative fever, negative chills  HEENT: negative visual disturbances, negative headaches  Respiratory: negative dyspnea, negative cough  Cardiovascular: negative chest pain,  negative palpitations  Gastrointestinal: negative abdominal pain, negative RUQ pain, positive N/V, negative diarrhea, positive constipation  Genitourinary: negative dysuria, negative vaginal discharge  Dermatological: negative rash  Hematologic: negative bruising  Immunologic/Lymphatic: negative recent illness, negative recent sick contact  Musculoskeletal: negative back pain, negative myalgias, negative arthralgias  Neurological:  negative dizziness, negative weakness  Behavior/Psych: negative depression, negative anxiety    OBSTETRICAL HISTORY:   Obstetric History       T3      L3     SAB0   TAB0   Ectopic0   Molar0   Multiple0   Live Births0       # Outcome Date GA Lbr Salty/2nd Weight Sex Delivery Anes PTL Lv   4 Current            3 Term      CS-Unspec      2 Term      CS-Unspec      1 Term      CS-Unspec             PAST MEDICAL HISTORY:   has no past medical history on file. PAST SURGICAL HISTORY:   has a past surgical history that includes Tonsillectomy and  section. ALLERGIES:  has No Known Allergies. MEDICATIONS:  Prior to Admission medications    Medication Sig Start Date End Date Taking?  Authorizing Provider   simethicone (MYLICON) 80 MG chewable tablet Take 1 tablet by mouth 2 times daily as needed for Flatulence 17   Humberto Arnold MD   guaiFENesin (ALTARUSSIN) 100 MG/5ML syrup Take 10 mLs by mouth every 4 hours as needed for Cough 17   Humberto Arnold MD   Insulin Aspart (NOVOLOG FLEXPEN SC) Inject 4 Units into the skin 4 units at breakfast and 4 units  lunch    Historical Provider, MD   Insulin NPH Human, Isophane, (HUMULIN N KWIKPEN SC) Inject 12 Units into the skin nightly    Historical Provider, MD   ferrous sulfate 325 (65 4:06 PM   Result Value Ref Range    Color, UA YELLOW YEL    Turbidity UA CLEAR CLEAR    Glucose, Ur NEGATIVE NEG    Bilirubin Urine NEGATIVE NEG    Ketones, Urine SMALL (A) NEG    Specific Gravity, UA 1.007 1.005 - 1.030    Urine Hgb NEGATIVE NEG    pH, UA 6.5 5.0 - 8.0    Protein, UA NEGATIVE NEG    Urobilinogen, Urine Normal NORM    Nitrite, Urine NEGATIVE NEG    Leukocyte Esterase, Urine TRACE (A) NEG    Urinalysis Comments NOT REPORTED    Microscopic Urinalysis    Collection Time: 10/14/17  4:06 PM   Result Value Ref Range    -          WBC, UA 0 TO 2 0 - 5 /HPF    RBC, UA 0 TO 2 0 - 4 /HPF    Casts UA 0 TO 2 HYALINE 0 - 8 /LPF    Crystals UA NOT REPORTED NONE /HPF    Epithelial Cells UA 10 TO 20 0 - 5 /HPF    Renal Epithelial, Urine NOT REPORTED 0 /HPF    Bacteria, UA NOT REPORTED NONE    Mucus, UA NOT REPORTED NONE    Trichomonas, UA NOT REPORTED NONE    Amorphous, UA NOT REPORTED NONE    Other Observations UA NOT REPORTED NREQ    Yeast, UA NOT REPORTED NONE         PRENATAL LAB RESULTS:   Blood Type/Rh: A positive  Antibody Screen: negative  Hemoglobin, Hematocrit, Platelets: 23.6 / 87.2 / 298  Rubella: immune  T.  Pallidum, IgG: non-reactive   Hepatitis B Surface Antigen: non-reactive   HIV: negative   Sickle Cell Screen: not done  Gonorrhea: negative  Chlamydia: negative  Urine culture: positive, E coli date: 17     1hr GTT: 188 mg/dL     Glucose Fastin  1 hour Glucose Tolerance Test: 240  2 hour Glucose Tolerance Test: 184  3 hour Glucose Tolerance Test: 154     Group B Strep:  not yet done  Cystic Fibrosis Screen: not available  First Trimester Screen: low risk  MSAFP/Multiple Markers: normal  Non-Invasive Prenatal Testing: not available  Anatomy US: normal, 3vc, female, anatomy WNL    ASSESSMENT & PLAN:  Siena Mar is a 32 y.o. female  at 35w2d IUP   - GBS unknown / Rh positive / R immune   - Pen G for GBS prophylaxis if delivery is imminent as baby is <37 weeks     Left Sided Flank Pain   - Afebrile, VSS   - TOCO: rare   - Positive Left CVA tenderness   - UA negative   - Tylenol PRN for pain   - IVF bolus NS    Hx of Pyelonephritis    - On macrobid prophylaxis    - Afebrile, urine pending     Pre-gestational DM, Class B   -diagnosed early in pregnancy with 1 hr GGT   -4 units of Novolog at breakfast and lunch, 12 units of NPH at night    Hx of C/S x 3    Subchorionic hematoma    Obesity   - BMI 31.9    Patient Active Problem List    Diagnosis Date Noted    Low maternal weight gain 10/12/2017    Variable fetal heart rate decelerations, antepartum 10/12/2017    Pre-existing type 2 diabetes mellitus during pregnancy, antepartum 2017    Hx of Pyelonephritis affecting pregnancy in third trimester      Needs suppressive therapy      Febrile  @ 1740 (100.7) 2017    Left flank pain 2017    Rh+/RI/GBS unk 2017     Prenatal Labs  Blood Type/Rh: A positive  Antibody Screen: negative  Hemoglobin, Hematocrit, Platelets: 14.2 / 64.3 / 298  Rubella: immune  T.  Pallidum, IgG: non-reactive   Hepatitis B Surface Antigen: non-reactive   HIV: negative   Sickle Cell Screen: not done  Gonorrhea: negative  Chlamydia: negative  Urine culture: positive, E coli date: 17    1hr GTT: 188 mg/dL    Glucose Fastin  1 hour Glucose Tolerance Test: 240  2 hour Glucose Tolerance Test: 184  3 hour Glucose Tolerance Test: 154     Group B Strep:  not yet done    Cystic Fibrosis Screen: not available  First Trimester Screen: low risk  MSAFP/Multiple Markers: normal  Non-Invasive Prenatal Testing: not available  Anatomy US: normal, 3vc, female, anatomy WNL      Subchorionic hematoma 2017    Type 2 DM- Class B 2017     Biweekly NSTs with weekly BPP  Growth q 3-4 wks  4 u novalog with breakfast and lunch, 12 u HS NPH      Obesity complicating pregnancy     Previous  delivery, antepartum condition or complication     H/O C/S x3 2017

## 2017-10-15 LAB
CULTURE: NORMAL
CULTURE: NORMAL
Lab: NORMAL
SPECIMEN DESCRIPTION: NORMAL
STATUS: NORMAL

## 2017-10-16 ENCOUNTER — ROUTINE PRENATAL (OUTPATIENT)
Dept: OBGYN CLINIC | Age: 27
End: 2017-10-16
Payer: MEDICARE

## 2017-10-16 VITALS
DIASTOLIC BLOOD PRESSURE: 72 MMHG | WEIGHT: 164 LBS | BODY MASS INDEX: 30.99 KG/M2 | HEART RATE: 78 BPM | SYSTOLIC BLOOD PRESSURE: 123 MMHG

## 2017-10-16 DIAGNOSIS — O24.119 PRE-EXISTING TYPE 2 DIABETES MELLITUS DURING PREGNANCY, ANTEPARTUM: Primary | ICD-10-CM

## 2017-10-16 PROCEDURE — 59025 FETAL NON-STRESS TEST: CPT | Performed by: OBSTETRICS & GYNECOLOGY

## 2017-10-16 RX ORDER — NITROFURANTOIN 25; 75 MG/1; MG/1
100 CAPSULE ORAL DAILY
COMMUNITY
End: 2021-02-21

## 2017-10-16 NOTE — PROGRESS NOTES
Vicky Ramírez is a  who presents for NST visit. She denies LOF, VB or Ctxs.  + FM. She was seen at the hospital 2 days ago for flank pain on the left side. She says the pain is much better today. She is only taking tylenol for occasional pain. O:  Vitals:    10/16/17 1128   BP: 123/72   Pulse: 78     NST: 140, mod variability, accels, no decels. Category 1 FHTs, reactive.     A/P:  Patient Active Problem List   Diagnosis    H/O C/S x3    Vanishing twin syndrome    Type 2 DM- Class B    Obesity complicating pregnancy    Previous  delivery, antepartum condition or complication    Subchorionic hematoma    Febrile  @ 1740 (100.7)    Left flank pain    Rh+/RI/GBS unk    Hx of Pyelonephritis affecting pregnancy in third trimester    Pre-existing type 2 diabetes mellitus during pregnancy, antepartum    Low maternal weight gain    Variable fetal heart rate decelerations, antepartum     Discussed s/sx that should prompt call to the office  Discussed caleb rutherford  RTC for biweekly NSTs with weekly BPP    Joseph Lipscomb MD

## 2017-10-20 ENCOUNTER — ROUTINE PRENATAL (OUTPATIENT)
Dept: OBGYN CLINIC | Age: 27
End: 2017-10-20
Payer: MEDICARE

## 2017-10-20 ENCOUNTER — HOSPITAL ENCOUNTER (OUTPATIENT)
Age: 27
Setting detail: SPECIMEN
Discharge: HOME | End: 2017-10-20
Payer: MEDICARE

## 2017-10-20 VITALS
SYSTOLIC BLOOD PRESSURE: 99 MMHG | HEART RATE: 89 BPM | WEIGHT: 166 LBS | DIASTOLIC BLOOD PRESSURE: 61 MMHG | BODY MASS INDEX: 31.37 KG/M2

## 2017-10-20 DIAGNOSIS — N89.8 VAGINAL IRRITATION: ICD-10-CM

## 2017-10-20 DIAGNOSIS — Z34.83 ENCOUNTER FOR SUPERVISION OF OTHER NORMAL PREGNANCY IN THIRD TRIMESTER: ICD-10-CM

## 2017-10-20 DIAGNOSIS — O24.119 PRE-EXISTING TYPE 2 DIABETES MELLITUS DURING PREGNANCY, ANTEPARTUM: Primary | ICD-10-CM

## 2017-10-20 LAB
DIRECT EXAM: NORMAL
Lab: NORMAL
SPECIMEN DESCRIPTION: NORMAL
STATUS: NORMAL

## 2017-10-20 PROCEDURE — 76818 FETAL BIOPHYS PROFILE W/NST: CPT | Performed by: OBSTETRICS & GYNECOLOGY

## 2017-10-20 PROCEDURE — 1036F TOBACCO NON-USER: CPT | Performed by: OBSTETRICS & GYNECOLOGY

## 2017-10-20 RX ORDER — DIPHENHYDRAMINE HCL 25 MG
25 CAPSULE ORAL NIGHTLY PRN
Qty: 30 CAPSULE | Refills: 0 | Status: SHIPPED | OUTPATIENT
Start: 2017-10-20 | End: 2017-11-02

## 2017-10-23 ENCOUNTER — ROUTINE PRENATAL (OUTPATIENT)
Dept: OBGYN CLINIC | Age: 27
End: 2017-10-23
Payer: MEDICARE

## 2017-10-23 VITALS
HEART RATE: 90 BPM | WEIGHT: 165 LBS | DIASTOLIC BLOOD PRESSURE: 68 MMHG | SYSTOLIC BLOOD PRESSURE: 98 MMHG | BODY MASS INDEX: 31.18 KG/M2

## 2017-10-23 DIAGNOSIS — O24.119 PRE-EXISTING TYPE 2 DIABETES MELLITUS DURING PREGNANCY, ANTEPARTUM: Primary | ICD-10-CM

## 2017-10-23 LAB
CULTURE: NORMAL
CULTURE: NORMAL
Lab: NORMAL
SPECIMEN DESCRIPTION: NORMAL
STATUS: NORMAL

## 2017-10-23 PROCEDURE — 76818 FETAL BIOPHYS PROFILE W/NST: CPT | Performed by: OBSTETRICS & GYNECOLOGY

## 2017-10-23 PROCEDURE — 99212 OFFICE O/P EST SF 10 MIN: CPT | Performed by: OBSTETRICS & GYNECOLOGY

## 2017-10-26 ENCOUNTER — ROUTINE PRENATAL (OUTPATIENT)
Dept: OBGYN CLINIC | Age: 27
End: 2017-10-26
Payer: MEDICARE

## 2017-10-26 VITALS — BODY MASS INDEX: 31.37 KG/M2 | WEIGHT: 166 LBS

## 2017-10-26 DIAGNOSIS — Z3A.37 37 WEEKS GESTATION OF PREGNANCY: ICD-10-CM

## 2017-10-26 DIAGNOSIS — O26.13 LOW WEIGHT GAIN DURING PREGNANCY IN THIRD TRIMESTER: ICD-10-CM

## 2017-10-26 DIAGNOSIS — O23.03 PYELONEPHRITIS AFFECTING PREGNANCY IN THIRD TRIMESTER: Primary | ICD-10-CM

## 2017-10-26 DIAGNOSIS — O24.119 PRE-EXISTING TYPE 2 DIABETES MELLITUS DURING PREGNANCY, ANTEPARTUM: ICD-10-CM

## 2017-10-26 PROCEDURE — 59025 FETAL NON-STRESS TEST: CPT | Performed by: OBSTETRICS & GYNECOLOGY

## 2017-10-30 ENCOUNTER — ROUTINE PRENATAL (OUTPATIENT)
Dept: OBGYN CLINIC | Age: 27
End: 2017-10-30
Payer: MEDICARE

## 2017-10-30 VITALS
DIASTOLIC BLOOD PRESSURE: 66 MMHG | HEART RATE: 85 BPM | SYSTOLIC BLOOD PRESSURE: 103 MMHG | BODY MASS INDEX: 31.55 KG/M2 | WEIGHT: 167 LBS

## 2017-10-30 DIAGNOSIS — O24.119 PRE-EXISTING TYPE 2 DIABETES MELLITUS DURING PREGNANCY, ANTEPARTUM: ICD-10-CM

## 2017-10-30 DIAGNOSIS — O26.13 LOW WEIGHT GAIN DURING PREGNANCY IN THIRD TRIMESTER: Primary | ICD-10-CM

## 2017-10-30 PROCEDURE — 59025 FETAL NON-STRESS TEST: CPT | Performed by: OBSTETRICS & GYNECOLOGY

## 2017-11-02 ENCOUNTER — ROUTINE PRENATAL (OUTPATIENT)
Dept: PERINATAL CARE | Age: 27
End: 2017-11-02
Payer: MEDICARE

## 2017-11-02 VITALS
SYSTOLIC BLOOD PRESSURE: 113 MMHG | BODY MASS INDEX: 31.81 KG/M2 | DIASTOLIC BLOOD PRESSURE: 79 MMHG | WEIGHT: 168.5 LBS | RESPIRATION RATE: 16 BRPM | HEART RATE: 113 BPM | HEIGHT: 61 IN | TEMPERATURE: 98.3 F

## 2017-11-02 DIAGNOSIS — Z36.4 ANTENATAL SCREENING FOR FETAL GROWTH RETARDATION USING ULTRASONICS: ICD-10-CM

## 2017-11-02 DIAGNOSIS — Z3A.38 38 WEEKS GESTATION OF PREGNANCY: ICD-10-CM

## 2017-11-02 DIAGNOSIS — O24.119 PRE-EXISTING TYPE 2 DIABETES MELLITUS DURING PREGNANCY, ANTEPARTUM: Primary | ICD-10-CM

## 2017-11-02 DIAGNOSIS — O26.13 LOW WEIGHT GAIN DURING PREGNANCY IN THIRD TRIMESTER: ICD-10-CM

## 2017-11-02 PROCEDURE — G8417 CALC BMI ABV UP PARAM F/U: HCPCS | Performed by: OBSTETRICS & GYNECOLOGY

## 2017-11-02 PROCEDURE — 76816 OB US FOLLOW-UP PER FETUS: CPT | Performed by: OBSTETRICS & GYNECOLOGY

## 2017-11-02 PROCEDURE — 3044F HG A1C LEVEL LT 7.0%: CPT | Performed by: OBSTETRICS & GYNECOLOGY

## 2017-11-02 PROCEDURE — G8427 DOCREV CUR MEDS BY ELIG CLIN: HCPCS | Performed by: OBSTETRICS & GYNECOLOGY

## 2017-11-02 PROCEDURE — 76818 FETAL BIOPHYS PROFILE W/NST: CPT | Performed by: OBSTETRICS & GYNECOLOGY

## 2017-11-06 ENCOUNTER — ROUTINE PRENATAL (OUTPATIENT)
Dept: OBGYN CLINIC | Age: 27
End: 2017-11-06
Payer: MEDICARE

## 2017-11-06 VITALS — BODY MASS INDEX: 31.55 KG/M2 | WEIGHT: 167 LBS | SYSTOLIC BLOOD PRESSURE: 110 MMHG | DIASTOLIC BLOOD PRESSURE: 72 MMHG

## 2017-11-06 DIAGNOSIS — O24.119 PRE-EXISTING TYPE 2 DIABETES MELLITUS DURING PREGNANCY, ANTEPARTUM: ICD-10-CM

## 2017-11-06 DIAGNOSIS — O26.13 LOW WEIGHT GAIN DURING PREGNANCY IN THIRD TRIMESTER: ICD-10-CM

## 2017-11-06 DIAGNOSIS — O23.03 PYELONEPHRITIS AFFECTING PREGNANCY IN THIRD TRIMESTER: Primary | ICD-10-CM

## 2017-11-06 DIAGNOSIS — Z3A.38 38 WEEKS GESTATION OF PREGNANCY: ICD-10-CM

## 2017-11-06 DIAGNOSIS — O36.8390 VARIABLE FETAL HEART RATE DECELERATIONS, ANTEPARTUM: ICD-10-CM

## 2017-11-06 PROCEDURE — 59025 FETAL NON-STRESS TEST: CPT | Performed by: OBSTETRICS & GYNECOLOGY

## 2017-11-09 ENCOUNTER — HOSPITAL ENCOUNTER (INPATIENT)
Age: 27
LOS: 3 days | Discharge: HOME | DRG: 540 | End: 2017-11-12
Attending: OBSTETRICS & GYNECOLOGY | Admitting: OBSTETRICS & GYNECOLOGY
Payer: MEDICARE

## 2017-11-09 ENCOUNTER — ANESTHESIA (OUTPATIENT)
Dept: LABOR AND DELIVERY | Age: 27
DRG: 540 | End: 2017-11-09
Payer: MEDICARE

## 2017-11-09 ENCOUNTER — ANESTHESIA EVENT (OUTPATIENT)
Dept: LABOR AND DELIVERY | Age: 27
DRG: 540 | End: 2017-11-09
Payer: MEDICARE

## 2017-11-09 VITALS — OXYGEN SATURATION: 100 % | DIASTOLIC BLOOD PRESSURE: 60 MMHG | SYSTOLIC BLOOD PRESSURE: 102 MMHG

## 2017-11-09 PROBLEM — Z98.891 S/P CESAREAN SECTION: Status: ACTIVE | Noted: 2017-11-09

## 2017-11-09 LAB
ABO/RH: NORMAL
AMPHETAMINE SCREEN URINE: NEGATIVE
ANTIBODY SCREEN: NEGATIVE
ARM BAND NUMBER: NORMAL
BARBITURATE SCREEN URINE: NEGATIVE
BENZODIAZEPINE SCREEN, URINE: NEGATIVE
BUPRENORPHINE URINE: NORMAL
CANNABINOID SCREEN URINE: NEGATIVE
COCAINE METABOLITE, URINE: NEGATIVE
EXPIRATION DATE: NORMAL
GLUCOSE BLD-MCNC: 110 MG/DL (ref 65–105)
GLUCOSE BLD-MCNC: 60 MG/DL (ref 65–105)
GLUCOSE BLD-MCNC: 74 MG/DL (ref 65–105)
GLUCOSE BLD-MCNC: 92 MG/DL (ref 65–105)
HCT VFR BLD CALC: 34.7 % (ref 36.3–47.1)
HEMOGLOBIN: 11 G/DL (ref 11.9–15.1)
MCH RBC QN AUTO: 26.3 PG (ref 25.2–33.5)
MCHC RBC AUTO-ENTMCNC: 31.7 G/DL (ref 28.4–34.8)
MCV RBC AUTO: 83 FL (ref 82.6–102.9)
MDMA URINE: NORMAL
METHADONE SCREEN, URINE: NEGATIVE
METHAMPHETAMINE, URINE: NORMAL
OPIATES, URINE: NEGATIVE
OXYCODONE SCREEN URINE: NEGATIVE
PDW BLD-RTO: 14.5 % (ref 11.8–14.4)
PHENCYCLIDINE, URINE: NEGATIVE
PLATELET # BLD: 188 K/UL (ref 138–453)
PMV BLD AUTO: 10.4 FL (ref 8.1–13.5)
PROPOXYPHENE, URINE: NORMAL
RBC # BLD: 4.18 M/UL (ref 3.95–5.11)
T. PALLIDUM, IGG: NONREACTIVE
TEST INFORMATION: NORMAL
TRICYCLIC ANTIDEPRESSANTS, UR: NORMAL
WBC # BLD: 13.2 K/UL (ref 3.5–11.3)

## 2017-11-09 PROCEDURE — 80307 DRUG TEST PRSMV CHEM ANLYZR: CPT

## 2017-11-09 PROCEDURE — 6370000000 HC RX 637 (ALT 250 FOR IP): Performed by: STUDENT IN AN ORGANIZED HEALTH CARE EDUCATION/TRAINING PROGRAM

## 2017-11-09 PROCEDURE — 3700000001 HC ADD 15 MINUTES (ANESTHESIA): Performed by: OBSTETRICS & GYNECOLOGY

## 2017-11-09 PROCEDURE — 6360000002 HC RX W HCPCS: Performed by: OBSTETRICS & GYNECOLOGY

## 2017-11-09 PROCEDURE — 3700000000 HC ANESTHESIA ATTENDED CARE: Performed by: OBSTETRICS & GYNECOLOGY

## 2017-11-09 PROCEDURE — 2580000003 HC RX 258: Performed by: NURSE ANESTHETIST, CERTIFIED REGISTERED

## 2017-11-09 PROCEDURE — 1220000000 HC SEMI PRIVATE OB R&B

## 2017-11-09 PROCEDURE — 85027 COMPLETE CBC AUTOMATED: CPT

## 2017-11-09 PROCEDURE — 2580000003 HC RX 258: Performed by: STUDENT IN AN ORGANIZED HEALTH CARE EDUCATION/TRAINING PROGRAM

## 2017-11-09 PROCEDURE — 86901 BLOOD TYPING SEROLOGIC RH(D): CPT

## 2017-11-09 PROCEDURE — 2500000003 HC RX 250 WO HCPCS: Performed by: STUDENT IN AN ORGANIZED HEALTH CARE EDUCATION/TRAINING PROGRAM

## 2017-11-09 PROCEDURE — 6360000002 HC RX W HCPCS: Performed by: STUDENT IN AN ORGANIZED HEALTH CARE EDUCATION/TRAINING PROGRAM

## 2017-11-09 PROCEDURE — 88307 TISSUE EXAM BY PATHOLOGIST: CPT

## 2017-11-09 PROCEDURE — 82947 ASSAY GLUCOSE BLOOD QUANT: CPT

## 2017-11-09 PROCEDURE — 86900 BLOOD TYPING SEROLOGIC ABO: CPT

## 2017-11-09 PROCEDURE — 86850 RBC ANTIBODY SCREEN: CPT

## 2017-11-09 PROCEDURE — 3609079900 HC CESAREAN SECTION: Performed by: OBSTETRICS & GYNECOLOGY

## 2017-11-09 PROCEDURE — 7100000001 HC PACU RECOVERY - ADDTL 15 MIN: Performed by: OBSTETRICS & GYNECOLOGY

## 2017-11-09 PROCEDURE — 87086 URINE CULTURE/COLONY COUNT: CPT

## 2017-11-09 PROCEDURE — S9443 LACTATION CLASS: HCPCS

## 2017-11-09 PROCEDURE — 7100000000 HC PACU RECOVERY - FIRST 15 MIN: Performed by: OBSTETRICS & GYNECOLOGY

## 2017-11-09 PROCEDURE — 6370000000 HC RX 637 (ALT 250 FOR IP): Performed by: OBSTETRICS & GYNECOLOGY

## 2017-11-09 PROCEDURE — 59514 CESAREAN DELIVERY ONLY: CPT | Performed by: OBSTETRICS & GYNECOLOGY

## 2017-11-09 PROCEDURE — 6360000002 HC RX W HCPCS: Performed by: NURSE ANESTHETIST, CERTIFIED REGISTERED

## 2017-11-09 PROCEDURE — 86780 TREPONEMA PALLIDUM: CPT

## 2017-11-09 RX ORDER — ONDANSETRON 4 MG/1
8 TABLET, FILM COATED ORAL EVERY 8 HOURS PRN
Status: DISCONTINUED | OUTPATIENT
Start: 2017-11-09 | End: 2017-11-12 | Stop reason: HOSPADM

## 2017-11-09 RX ORDER — DEXTROSE MONOHYDRATE 50 MG/ML
100 INJECTION, SOLUTION INTRAVENOUS PRN
Status: DISCONTINUED | OUTPATIENT
Start: 2017-11-09 | End: 2017-11-12 | Stop reason: HOSPADM

## 2017-11-09 RX ORDER — SODIUM CHLORIDE 0.9 % (FLUSH) 0.9 %
10 SYRINGE (ML) INJECTION EVERY 12 HOURS SCHEDULED
Status: DISCONTINUED | OUTPATIENT
Start: 2017-11-09 | End: 2017-11-12 | Stop reason: HOSPADM

## 2017-11-09 RX ORDER — KETOROLAC TROMETHAMINE 30 MG/ML
30 INJECTION, SOLUTION INTRAMUSCULAR; INTRAVENOUS EVERY 6 HOURS
Status: DISPENSED | OUTPATIENT
Start: 2017-11-09 | End: 2017-11-10

## 2017-11-09 RX ORDER — ONDANSETRON 2 MG/ML
INJECTION INTRAMUSCULAR; INTRAVENOUS PRN
Status: DISCONTINUED | OUTPATIENT
Start: 2017-11-09 | End: 2017-11-09 | Stop reason: SDUPTHER

## 2017-11-09 RX ORDER — NICOTINE POLACRILEX 4 MG
15 LOZENGE BUCCAL PRN
Status: DISCONTINUED | OUTPATIENT
Start: 2017-11-09 | End: 2017-11-12 | Stop reason: HOSPADM

## 2017-11-09 RX ORDER — OXYCODONE HYDROCHLORIDE AND ACETAMINOPHEN 5; 325 MG/1; MG/1
1 TABLET ORAL EVERY 4 HOURS PRN
Status: DISCONTINUED | OUTPATIENT
Start: 2017-11-10 | End: 2017-11-12 | Stop reason: HOSPADM

## 2017-11-09 RX ORDER — DIPHENHYDRAMINE HYDROCHLORIDE 50 MG/ML
25 INJECTION INTRAMUSCULAR; INTRAVENOUS EVERY 6 HOURS PRN
Status: DISCONTINUED | OUTPATIENT
Start: 2017-11-09 | End: 2017-11-12 | Stop reason: HOSPADM

## 2017-11-09 RX ORDER — SODIUM CHLORIDE 0.9 % (FLUSH) 0.9 %
10 SYRINGE (ML) INJECTION PRN
Status: DISCONTINUED | OUTPATIENT
Start: 2017-11-09 | End: 2017-11-09

## 2017-11-09 RX ORDER — SODIUM CHLORIDE, SODIUM LACTATE, POTASSIUM CHLORIDE, CALCIUM CHLORIDE 600; 310; 30; 20 MG/100ML; MG/100ML; MG/100ML; MG/100ML
INJECTION, SOLUTION INTRAVENOUS CONTINUOUS PRN
Status: DISCONTINUED | OUTPATIENT
Start: 2017-11-09 | End: 2017-11-09 | Stop reason: SDUPTHER

## 2017-11-09 RX ORDER — SODIUM CHLORIDE 9 MG/ML
125 INJECTION, SOLUTION INTRAVENOUS CONTINUOUS
Status: DISCONTINUED | OUTPATIENT
Start: 2017-11-09 | End: 2017-11-12 | Stop reason: HOSPADM

## 2017-11-09 RX ORDER — DEXTROSE MONOHYDRATE 25 G/50ML
12.5 INJECTION, SOLUTION INTRAVENOUS PRN
Status: DISCONTINUED | OUTPATIENT
Start: 2017-11-09 | End: 2017-11-12 | Stop reason: HOSPADM

## 2017-11-09 RX ORDER — SODIUM CHLORIDE, SODIUM LACTATE, POTASSIUM CHLORIDE, CALCIUM CHLORIDE 600; 310; 30; 20 MG/100ML; MG/100ML; MG/100ML; MG/100ML
125 INJECTION, SOLUTION INTRAVENOUS CONTINUOUS
Status: DISCONTINUED | OUTPATIENT
Start: 2017-11-09 | End: 2017-11-09

## 2017-11-09 RX ORDER — OXYCODONE HYDROCHLORIDE AND ACETAMINOPHEN 5; 325 MG/1; MG/1
2 TABLET ORAL EVERY 4 HOURS PRN
Status: DISCONTINUED | OUTPATIENT
Start: 2017-11-10 | End: 2017-11-12 | Stop reason: HOSPADM

## 2017-11-09 RX ORDER — SODIUM CHLORIDE 0.9 % (FLUSH) 0.9 %
10 SYRINGE (ML) INJECTION PRN
Status: DISCONTINUED | OUTPATIENT
Start: 2017-11-09 | End: 2017-11-12 | Stop reason: HOSPADM

## 2017-11-09 RX ORDER — DOCUSATE SODIUM 100 MG/1
100 CAPSULE, LIQUID FILLED ORAL 2 TIMES DAILY
Status: DISCONTINUED | OUTPATIENT
Start: 2017-11-09 | End: 2017-11-12 | Stop reason: HOSPADM

## 2017-11-09 RX ORDER — SODIUM CHLORIDE 9 MG/ML
INJECTION, SOLUTION INTRAVENOUS CONTINUOUS
Status: DISCONTINUED | OUTPATIENT
Start: 2017-11-09 | End: 2017-11-09

## 2017-11-09 RX ORDER — LANOLIN 100 %
OINTMENT (GRAM) TOPICAL
Status: DISCONTINUED | OUTPATIENT
Start: 2017-11-09 | End: 2017-11-12 | Stop reason: HOSPADM

## 2017-11-09 RX ORDER — IBUPROFEN 800 MG/1
800 TABLET ORAL EVERY 8 HOURS PRN
Status: DISCONTINUED | OUTPATIENT
Start: 2017-11-10 | End: 2017-11-12 | Stop reason: HOSPADM

## 2017-11-09 RX ORDER — TRISODIUM CITRATE DIHYDRATE AND CITRIC ACID MONOHYDRATE 500; 334 MG/5ML; MG/5ML
30 SOLUTION ORAL ONCE
Status: COMPLETED | OUTPATIENT
Start: 2017-11-09 | End: 2017-11-09

## 2017-11-09 RX ADMIN — Medication 500 ML: at 08:41

## 2017-11-09 RX ADMIN — KETOROLAC TROMETHAMINE 30 MG: 30 INJECTION, SOLUTION INTRAMUSCULAR at 22:31

## 2017-11-09 RX ADMIN — Medication 1 MILLI-UNITS/MIN: at 11:58

## 2017-11-09 RX ADMIN — ONDANSETRON 4 MG: 2 INJECTION, SOLUTION INTRAMUSCULAR; INTRAVENOUS at 08:39

## 2017-11-09 RX ADMIN — PHENYLEPHRINE HYDROCHLORIDE 100 MCG: 10 INJECTION INTRAMUSCULAR; INTRAVENOUS; SUBCUTANEOUS at 08:32

## 2017-11-09 RX ADMIN — SODIUM CITRATE AND CITRIC ACID MONOHYDRATE 30 ML: 500; 334 SOLUTION ORAL at 07:58

## 2017-11-09 RX ADMIN — PHENYLEPHRINE HYDROCHLORIDE 100 MCG: 10 INJECTION INTRAMUSCULAR; INTRAVENOUS; SUBCUTANEOUS at 08:23

## 2017-11-09 RX ADMIN — KETOROLAC TROMETHAMINE 30 MG: 30 INJECTION, SOLUTION INTRAMUSCULAR at 10:33

## 2017-11-09 RX ADMIN — PHENYLEPHRINE HYDROCHLORIDE 100 MCG: 10 INJECTION INTRAMUSCULAR; INTRAVENOUS; SUBCUTANEOUS at 09:13

## 2017-11-09 RX ADMIN — WATER 2 G: 100 INJECTION, SOLUTION INTRAVENOUS at 07:59

## 2017-11-09 RX ADMIN — INSULIN HUMAN 6 UNITS: 100 INJECTION, SUSPENSION SUBCUTANEOUS at 22:36

## 2017-11-09 RX ADMIN — PHENYLEPHRINE HYDROCHLORIDE 100 MCG: 10 INJECTION INTRAMUSCULAR; INTRAVENOUS; SUBCUTANEOUS at 09:07

## 2017-11-09 RX ADMIN — KETOROLAC TROMETHAMINE 30 MG: 30 INJECTION, SOLUTION INTRAMUSCULAR at 16:07

## 2017-11-09 RX ADMIN — SODIUM CHLORIDE: 9 INJECTION, SOLUTION INTRAVENOUS at 07:28

## 2017-11-09 RX ADMIN — PHENYLEPHRINE HYDROCHLORIDE 100 MCG: 10 INJECTION INTRAMUSCULAR; INTRAVENOUS; SUBCUTANEOUS at 08:59

## 2017-11-09 RX ADMIN — SODIUM CHLORIDE, POTASSIUM CHLORIDE, SODIUM LACTATE AND CALCIUM CHLORIDE: 600; 310; 30; 20 INJECTION, SOLUTION INTRAVENOUS at 09:07

## 2017-11-09 RX ADMIN — PHENYLEPHRINE HYDROCHLORIDE 100 MCG: 10 INJECTION INTRAMUSCULAR; INTRAVENOUS; SUBCUTANEOUS at 08:53

## 2017-11-09 RX ADMIN — DIPHENHYDRAMINE HYDROCHLORIDE 25 MG: 50 INJECTION, SOLUTION INTRAMUSCULAR; INTRAVENOUS at 11:21

## 2017-11-09 RX ADMIN — PHENYLEPHRINE HYDROCHLORIDE 100 MCG: 10 INJECTION INTRAMUSCULAR; INTRAVENOUS; SUBCUTANEOUS at 08:46

## 2017-11-09 ASSESSMENT — PAIN SCALES - GENERAL
PAINLEVEL_OUTOF10: 4
PAINLEVEL_OUTOF10: 0
PAINLEVEL_OUTOF10: 2
PAINLEVEL_OUTOF10: 0
PAINLEVEL_OUTOF10: 0

## 2017-11-09 NOTE — PROGRESS NOTES
Patient admitted to room 750 from L&D via bed. Oriented to room and surroundings. Plan of care reviewed. Verbalized understanding. Instructed on infant security and safe sleep practices. The following handouts given: A New Beginning: Your Guide to Postpartum Care, Rounding, Hugs Security System, [employer], Babies Cry A lot, Safe Sleep, Security and Visitation Guidelines. Call light placed within reach.  at bedside. All questions verbalized and answered with  in room.

## 2017-11-09 NOTE — L&D DELIVERY NOTE
Vessels:  3 Vessels   Complications:  None   Delayed Cord Clamping?:  Yes   Cord Clamped Date/Time:  2017 0939   Cord Blood Disposition:  Lab   Gases Sent?:  Yes   Cord Comments:  Venous sample only   Stem Cell Collection (by MD):  No          Placenta    Date/time:  2017 0840   Removal:  Spontaneous   Appearance:  Intact   Disposition:  Lab, Pathology          Delivery Resuscitation    Method:  Bulb Suction, Stimulation          Apgars    Living status:  Living   Apgars    1 Minute:   5 Minute:   10 Minute 15 Minute 20 Minute   Skin Color:        Heart Rate:        Reflex Irritability:        Muscle Tone:        Respiratory Effort:         Total:                   Apgars Assigned By:  NICU          Skin to Skin    Skin to skin initiation date/time:     Skin to skin end date/time:     Reason skin to skin not initiated:  [place] Acuity          [place] Measurements    Weight:  3360 g           Delivery Information    Episiotomy:  None   Perineal lacerations:  None    Vaginal laceration:  No    Cervical laceration:  No    Surgical or additional est. blood loss (mL):  500 (View Only):  Edit in Flowsheets   Combined est. blood loss (mL):  500   Repair suture:  None          Other Procedures    Procedures:  None                Electronically signed by Tab Story DO on 2017 at 9:52 AM

## 2017-11-09 NOTE — H&P
OBSTETRICAL HISTORY [facility]    Date: 2017       Time: 6:27 AM   Patient Name: Neeru Eddy     Patient : 1990  [facility]    Admission Date/Time: 2017  6:17 AM      CC: scheduled  section     HPI: Neeru Eddy is a 32 y.o.  at 39w0d who presents for scheduled  section. Patient has a history of 3 previous C/S. Patient denies any headache, visual changes, difficulty breathing, RUQ pain, N/V, F/C, and pain/swelling in lower extremities. The patient reports fetal movement is present, complains of contractions, denies loss of fluid, denies vaginal bleeding. Pregnancy is complicated by Type 2 Diabetes Mellitus Class B. Her  Insulin regimen is 4 Units of Novolog with breakfast and lunch and 12 NPH at night (FBS has been 80-90, B; , L 110's D ). Patient also has a history of 3 previous C/S, obesity, and subchorionic hematoma. DATING:  LMP: Patient's last menstrual period was 2017.   Estimated Date of Delivery: 17   Based on: early ultrasound, at 5 0/7 weeks GA    PREGNANCY RISK FACTORS:  Patient Active Problem List   Diagnosis    H/O C/S x3    Vanishing twin syndrome    Type 2 DM- Class B    Obesity complicating pregnancy    Previous  delivery, antepartum condition or complication    Subchorionic hematoma    Febrile  @ 1740 (100.7)    Left flank pain    Rh+/RI/GBS unk    Hx of Pyelonephritis affecting pregnancy in third trimester    Pre-existing type 2 diabetes mellitus during pregnancy, antepartum    Low maternal weight gain    Variable fetal heart rate decelerations, antepartum        Steroids Given In This Pregnancy:  no     REVIEW OF SYSTEMS:   Constitutional: negative fever, negative chills  HEENT: negative visual disturbances, negative headaches  Respiratory: negative dyspnea, negative cough  Cardiovascular: negative chest pain,  negative palpitations  Gastrointestinal: negative abdominal pain, negative RUQ pain, negative N/V, negative diarrhea, negative constipation  Genitourinary: negative dysuria, negative vaginal discharge  Dermatological: negative rash  Hematologic: negative bruising  Immunologic/Lymphatic: negative recent illness, negative recent sick contact  Musculoskeletal: negative back pain, negative myalgias, negative arthralgias  Neurological:  negative dizziness, negative weakness  Behavior/Psych: negative depression, negative anxiety      OBSTETRICAL HISTORY:   Obstetric History       T3      L3     SAB0   TAB0   Ectopic0   Molar0   Multiple0   Live Births0       # Outcome Date GA Lbr Salty/2nd Weight Sex Delivery Anes PTL Lv   4 Current            3 Term      CS-Unspec      2 Term      CS-Unspec      1 Term      CS-Unspec             PAST MEDICAL HISTORY:   has no past medical history on file. PAST SURGICAL HISTORY:   has a past surgical history that includes Tonsillectomy and  section. ALLERGIES:  has No Known Allergies. MEDICATIONS:  Prior to Admission medications    Medication Sig Start Date End Date Taking?  Authorizing Provider   nitrofurantoin, macrocrystal-monohydrate, (MACROBID) 100 MG capsule Take 100 mg by mouth daily     Historical Provider, MD   acetaminophen-codeine (TYLENOL #3) 300-30 MG per tablet Take 1 tablet by mouth every 6 hours as needed for Pain 10/14/17   Mel L Rondon   Insulin Aspart (NOVOLOG FLEXPEN SC) Inject 4 Units into the skin 4 units at breakfast and 4 units  lunch    Historical Provider, MD   Insulin NPH Human, Isophane, (HUMULIN N KWIKPEN SC) Inject 12 Units into the skin nightly    Historical Provider, MD   ferrous sulfate 325 (65 Fe) MG EC tablet Take 1 tablet by mouth 2 times daily (with meals) 17   Blanca Toscano DO   docusate (COLACE, DULCOLAX) 100 MG CAPS Take 100 mg by mouth daily as needed (constipation) 17   Blanca Toscano DO   Cholecalciferol (VITAMIN D3) Kaitlynn [facility] 320  2/28/17   Historical Provider, MD   Prenatal Vit-Fe Fumarate-FA (VOL-NIA) 28-1 MG TABS  3/14/17   Historical Provider, MD   acetaminophen (APAP EXTRA STRENGTH) 500 MG tablet Take 2 tablets by mouth every 6 hours as needed for Pain 4/4/17   Paul Berrios MD       FAMILY HISTORY:  family history includes Cancer in her father; High Blood Pressure in her mother. SOCIAL HISTORY:   reports that she has never smoked. She has never used smokeless tobacco. She reports that she does not drink alcohol or use drugs. VITALS:  Vitals:    11/09/17 0646   BP: 119/80   Pulse: 82   Resp: 18   TempSrc: Oral       PHYSICAL EXAM:  Fetal Heart Monitor:  Baseline Heart Rate 130, moderate variability, present accelerations, absent decelerations  [place]: contractions, irregular, every 2-5 minutes    General appearance:  no apparent distress, alert and cooperative  Neurologic:  alert, oriented, normal speech, no focal findings or movement disorder noted  Lungs:  No increased work of breathing, good air exchange, clear to auscultation bilaterally, no crackles or wheezing  Heart:  regular rate and rhythm and no murmur    Abdomen:  soft, gravid, non-tender, no right upper quadrant tenderness, no CVA tenderness, uterus non-tender, no signs of abruption and no signs of chorioamnionitis  Extremities:  no calf tenderness, non edematous, DTRs: normal    Pelvic Exam: declined       LIMITED BEDSIDE US:  Position: Cephalic  Placental Location: posterior  Fetal Heart Tones: Present  Fetal Movement: Present  Amniotic Fluid Index/Volume: adequate 2x2 cm fluid pocket  Estimated Fetal Weight:  6 lbs 11 oz      PRENATAL LAB RESULTS:   Blood Type/Rh: A positive  Antibody Screen: negative  Hemoglobin, Hematocrit, Platelets: 11.1 / 64.4 / 298  Rubella: immune  T.  Pallidum, IgG: non-reactive   Hepatitis B Surface Antigen: non-reactive   HIV: negative   Sickle Cell Screen: not done  Gonorrhea: negative  Chlamydia: negative  Urine date: 17    1hr GTT: 188 mg/dL    Glucose Fastin  1 hour Glucose Tolerance Test: 240  2 hour Glucose Tolerance Test: 184  3 hour Glucose Tolerance Test: 154     Group B Strep:  not yet done    Cystic Fibrosis Screen: not available  First Trimester Screen: low risk  MSAFP/Multiple Markers: normal  Non-Invasive Prenatal Testing: not available  Anatomy US: normal, 3vc, female, anatomy WNL      Subchorionic hematoma 2017    Type 2 DM- Class B 2017     Biweekly NSTs with weekly BPP  Growth q 3-4 wks  4 u novalog with breakfast and lunch, 12 u HS NPH      Obesity complicating pregnancy     Previous  delivery, antepartum condition or complication     H/O C/S x3 04/25/2017     x3        Vanishing twin syndrome 2017       Plan discussed with Dr. Miriam Krabbe, who is agreeable. Steroids given this admission: No    Risks, benefits, alternatives and possible complications have been discussed in detail with the patient. Admission, and post admission procedures and expectations were discussed in detail. All questions were answered.     Attending's Name: Dr. Wyvonne Burkitt  Ob/Gyn Resident  2017, 6:27 AM

## 2017-11-09 NOTE — LACTATION NOTE
Through the interpretor mom reports baby fed well twice. Encouraged mom to feed the baby every 3 hours. Mom to call out for assistance as needed.

## 2017-11-09 NOTE — ANESTHESIA PRE PROCEDURE
Department of Anesthesiology  Preprocedure Note       Name:  Marck Luna   Age:  32 y.o.  :  1990                                          MRN:  [id number]         Date:  2017      Surgeon: Leotis Paget):  Antonette Rdz MD    Procedure: Procedure(s):   SECTION    Medications prior to admission:   Prior to Admission medications    Medication Sig Start Date End Date Taking?  Authorizing Provider   nitrofurantoin, macrocrystal-monohydrate, (MACROBID) 100 MG capsule Take 100 mg by mouth daily     Historical Provider, MD   acetaminophen-codeine (TYLENOL #3) 300-30 MG per tablet Take 1 tablet by mouth every 6 hours as needed for Pain 10/14/17   Mel L Rondon   Insulin Aspart (NOVOLOG FLEXPEN SC) Inject 4 Units into the skin 4 units at breakfast and 4 units  lunch    Historical Provider, MD   Insulin NPH Human, Isophane, (HUMULIN N KWIKPEN SC) Inject 12 Units into the skin nightly    Historical Provider, MD   ferrous sulfate 325 (65 Fe) MG EC tablet Take 1 tablet by mouth 2 times daily (with meals) 17   Alex De La Rosa DO   docusate (COLACE, DULCOLAX) 100 MG CAPS Take 100 mg by mouth daily as needed (constipation) 17   Alex De La Rosa DO   Cholecalciferol (VITAMIN D3) 1000 UNITS TABS  17   Historical Provider, MD   Prenatal Vit-Fe Fumarate-FA (VOL-NIA) 28-1 MG TABS  3/14/17   Historical Provider, MD   acetaminophen (APAP EXTRA STRENGTH) 500 MG tablet Take 2 tablets by mouth every 6 hours as needed for Pain 17   Antonette Rdz MD       Current medications:    Current Facility-Administered Medications   Medication Dose Route Frequency Provider Last Rate Last Dose    sodium chloride flush 0.9 % injection 10 mL  10 mL Intravenous PRN Mel L Rondon        citric acid-sodium citrate (BICITRA) solution 30 mL  30 mL Oral Once Mel Rondon        ceFAZolin (ANCEF) 2 g in sterile water 20 mL IV syringe  2 g Intravenous On Call to [address]        0.9 % sodium chloride

## 2017-11-09 NOTE — DISCHARGE SUMMARY
START taking these medications    ibuprofen 800 MG tablet  Commonly known as:  ADVIL;MOTRIN  Take 1 tablet by mouth every 8 hours as needed for Pain or Fever     oxyCODONE-acetaminophen 5-325 MG per tablet  Commonly known as:  PERCOCET  Take 1 tablet by mouth every 6 hours as needed for Pain . CHANGE how you take these medications    * docusate 100 MG Caps  Commonly known as:  COLACE, DULCOLAX  Take 100 mg by mouth daily as needed (constipation)  What changed:  Another medication with the same name was added. Make sure you understand how and when to take each. * docusate 100 MG Caps  Commonly known as:  COLACE, DULCOLAX  Take 100 mg by mouth 2 times daily  What changed: You were already taking a medication with the same name, and this prescription was added. Make sure you understand how and when to take each. * This list has 2 medication(s) that are the same as other medications prescribed for you. Read the directions carefully, and ask your doctor or other care provider to review them with you.             CONTINUE taking these medications    acetaminophen 500 MG tablet  Commonly known as:  APAP EXTRA STRENGTH  Take 2 tablets by mouth every 6 hours as needed for Pain     acetaminophen-codeine 300-30 MG per tablet  Commonly known as:  TYLENOL #3  Take 1 tablet by mouth every 6 hours as needed for Pain     ferrous sulfate 325 (65 Fe) MG EC tablet  Take 1 tablet by mouth 2 times daily (with meals)     HUMULIN N KWIKPEN SC     nitrofurantoin (macrocrystal-monohydrate) 100 MG capsule  Commonly known as:  MACROBID     NOVOLOG FLEXPEN SC     vitamin D3 1000 units Tabs     VOL-NIA 28-1 MG Tabs           Where to Get Your Medications      You can get these medications from any pharmacy    Bring a paper prescription for each of these medications  · docusate 100 MG Caps  · ibuprofen 800 MG tablet  · oxyCODONE-acetaminophen 5-325 MG per tablet           Activity: pelvic rest x 6 weeks, no driving on narcotics, no lifting greater than 15 lbs  Diet: diabetic diet  Follow up: 2 weeks     Condition on discharge: stable    Discharge date: 11/12/17    James Plaza DO  Ob/Gyn Resident    Comments:  Home care and follow-up care were reviewed. Pelvic rest, and birth control were reviewed. Signs and symptoms of mastitis and post partum depression were reviewed. The patient is to notify her physician if any of these occur. The patient was counseled on secondary smoke risks and the increased risk of sudden infant death syndrome and respiratory problems to her baby with exposure. She was counseled on various alternate recommendations to decrease the exposure to secondary smoke to her children. Attending Physician Statement  I have discussed the care of [address] [place] [address] [address], including pertinent history and exam findings,  with the resident. I have reviewed the key elements of all parts of the encounter with the resident. I agree with the assessment, plan and orders as documented by the resident.   (GE Modifier)

## 2017-11-09 NOTE — OP NOTE
and extended transversely using fine scissors for sharp dissection. The superior fascia was grasped with Kocher clamps and the fascia was  from the underlying rectus tissue superiorly and inferiorly using blunt dissection. The peritoneum was identified and entered bluntly. Scissors were used to take the peritoneum down sharply. The peritoneal incision was extended superiorly and inferiorly to the bladder reflection with good visualization of the bladder,  bladder blade was placed. A low transverse uterine incision was made. Blunt stretch on the hysterotomy incision was made and the amniotomy was performed revealing clear fluid. Delivered from cephalic presentation was a Live Born female infant. The infant was suctioned, dried and the umbilical cord was clamped and cut. The infant was taken to the warmer and attended by NICU for evaluation. A second section of cord was clamped and cut and sent for gases. Cord blood was not obtained for evaluation. The placenta was removed spontaneously with gentle traction and appeared intact. Pitocin was started. The uterine outline appeared normal. The uterus was exteriorized and cleaned of all clots and debris. The uterine incision was closed with running locked sutures of 0 Monocryl. Hemostasis was observed. A figure of eight suture was needed in the right corner of the hysterotomy incision to obtain excellent hemostasis. An imbricating layer was placed with 0 Monocryl in running fashion. B/L tubes and ovaries were visualized and WNL. The uterus was reintroduced into the abdominal cavity and hysterotomy incision was inspected and found to be hemostatic. Abdomen was  copiously irrigated. Sponges x 2 were used to clear the gutter. Peritoneum was reapproximated with runninwasg suture of 3-0 Vicryl. Rectus muscles were inspected and found to be hemostatic. Rectus muscle were reapproximated using interrupted 3-0 Vicryl suture.  The fascia was then reapproximated with running sutures of 0 Vicryl. The skin was reapproximated with a 4-0 Vicryl. The skin was then cleansed and dressed with a bandage in sterile fashion. Instrument, sponge, and needle counts were correct prior the abdominal closure and at the conclusion of the case. The urine remained clear throughout the case. Ancef was given for antibiotic prohylaxis. SCDs for DVT prophylaxis remain in place for the post operative period. Dr. Mary Cook was present for the entire operation.       Meng Tomas DO  Ob/Gyn Resident  [facility], [address]  11/9/2017, 9:52 AM

## 2017-11-10 LAB
CULTURE: NO GROWTH
CULTURE: NORMAL
GLUCOSE BLD-MCNC: 106 MG/DL (ref 65–105)
GLUCOSE BLD-MCNC: 108 MG/DL (ref 65–105)
GLUCOSE BLD-MCNC: 72 MG/DL (ref 65–105)
GLUCOSE BLD-MCNC: 77 MG/DL (ref 65–105)
HCT VFR BLD CALC: 31.1 % (ref 36.3–47.1)
HEMOGLOBIN: 10.3 G/DL (ref 11.9–15.1)
Lab: NORMAL
MCH RBC QN AUTO: 27 PG (ref 25.2–33.5)
MCHC RBC AUTO-ENTMCNC: 33.1 G/DL (ref 28.4–34.8)
MCV RBC AUTO: 81.4 FL (ref 82.6–102.9)
PDW BLD-RTO: 14.6 % (ref 11.8–14.4)
PLATELET # BLD: 217 K/UL (ref 138–453)
PMV BLD AUTO: 10.7 FL (ref 8.1–13.5)
RBC # BLD: 3.82 M/UL (ref 3.95–5.11)
SPECIMEN DESCRIPTION: NORMAL
STATUS: NORMAL
WBC # BLD: 12.9 K/UL (ref 3.5–11.3)

## 2017-11-10 PROCEDURE — 85027 COMPLETE CBC AUTOMATED: CPT

## 2017-11-10 PROCEDURE — S9443 LACTATION CLASS: HCPCS

## 2017-11-10 PROCEDURE — 36415 COLL VENOUS BLD VENIPUNCTURE: CPT

## 2017-11-10 PROCEDURE — 6370000000 HC RX 637 (ALT 250 FOR IP): Performed by: OBSTETRICS & GYNECOLOGY

## 2017-11-10 PROCEDURE — 82947 ASSAY GLUCOSE BLOOD QUANT: CPT

## 2017-11-10 PROCEDURE — 1220000000 HC SEMI PRIVATE OB R&B

## 2017-11-10 RX ADMIN — OXYCODONE HYDROCHLORIDE AND ACETAMINOPHEN 2 TABLET: 5; 325 TABLET ORAL at 11:16

## 2017-11-10 RX ADMIN — INSULIN LISPRO 2 UNITS: 100 INJECTION, SOLUTION INTRAVENOUS; SUBCUTANEOUS at 09:33

## 2017-11-10 RX ADMIN — IBUPROFEN 800 MG: 800 TABLET, FILM COATED ORAL at 08:44

## 2017-11-10 RX ADMIN — DOCUSATE SODIUM 100 MG: 100 CAPSULE ORAL at 23:38

## 2017-11-10 RX ADMIN — OXYCODONE HYDROCHLORIDE AND ACETAMINOPHEN 2 TABLET: 5; 325 TABLET ORAL at 15:25

## 2017-11-10 RX ADMIN — OXYCODONE HYDROCHLORIDE AND ACETAMINOPHEN 2 TABLET: 5; 325 TABLET ORAL at 23:38

## 2017-11-10 RX ADMIN — IBUPROFEN 800 MG: 800 TABLET, FILM COATED ORAL at 17:55

## 2017-11-10 RX ADMIN — DOCUSATE SODIUM 100 MG: 100 CAPSULE ORAL at 08:45

## 2017-11-10 ASSESSMENT — PAIN DESCRIPTION - LOCATION
LOCATION: ABDOMEN
LOCATION: ABDOMEN

## 2017-11-10 ASSESSMENT — PAIN SCALES - GENERAL
PAINLEVEL_OUTOF10: 10
PAINLEVEL_OUTOF10: 5
PAINLEVEL_OUTOF10: 6
PAINLEVEL_OUTOF10: 4
PAINLEVEL_OUTOF10: 4

## 2017-11-11 LAB
GLUCOSE BLD-MCNC: 100 MG/DL (ref 65–105)
GLUCOSE BLD-MCNC: 104 MG/DL (ref 65–105)

## 2017-11-11 PROCEDURE — 1220000000 HC SEMI PRIVATE OB R&B

## 2017-11-11 PROCEDURE — 82947 ASSAY GLUCOSE BLOOD QUANT: CPT

## 2017-11-11 PROCEDURE — 6370000000 HC RX 637 (ALT 250 FOR IP): Performed by: OBSTETRICS & GYNECOLOGY

## 2017-11-11 RX ORDER — IBUPROFEN 800 MG/1
800 TABLET ORAL EVERY 8 HOURS PRN
Qty: 60 TABLET | Refills: 0 | Status: SHIPPED | OUTPATIENT
Start: 2017-11-11 | End: 2021-02-21

## 2017-11-11 RX ORDER — OXYCODONE HYDROCHLORIDE AND ACETAMINOPHEN 5; 325 MG/1; MG/1
1 TABLET ORAL EVERY 6 HOURS PRN
Qty: 15 TABLET | Refills: 0 | Status: SHIPPED | OUTPATIENT
Start: 2017-11-11 | End: 2017-11-18

## 2017-11-11 RX ORDER — PSEUDOEPHEDRINE HCL 30 MG
100 TABLET ORAL 2 TIMES DAILY
Qty: 60 CAPSULE | Refills: 0 | Status: SHIPPED | OUTPATIENT
Start: 2017-11-11 | End: 2021-01-13

## 2017-11-11 RX ADMIN — OXYCODONE HYDROCHLORIDE AND ACETAMINOPHEN 2 TABLET: 5; 325 TABLET ORAL at 14:07

## 2017-11-11 RX ADMIN — OXYCODONE HYDROCHLORIDE AND ACETAMINOPHEN 2 TABLET: 5; 325 TABLET ORAL at 19:01

## 2017-11-11 RX ADMIN — OXYCODONE HYDROCHLORIDE AND ACETAMINOPHEN 2 TABLET: 5; 325 TABLET ORAL at 07:33

## 2017-11-11 RX ADMIN — DOCUSATE SODIUM 100 MG: 100 CAPSULE ORAL at 11:57

## 2017-11-11 RX ADMIN — IBUPROFEN 800 MG: 800 TABLET, FILM COATED ORAL at 11:53

## 2017-11-11 RX ADMIN — IBUPROFEN 800 MG: 800 TABLET, FILM COATED ORAL at 01:08

## 2017-11-11 ASSESSMENT — PAIN SCALES - GENERAL
PAINLEVEL_OUTOF10: 8
PAINLEVEL_OUTOF10: 4
PAINLEVEL_OUTOF10: 7

## 2017-11-11 NOTE — PROGRESS NOTES
POST OPERATIVE DAY # 2    Marizol Evangelista is a 32 y.o. female   This patient was seen and examined today. RLTCS on 17. Her pregnancy was complicated by:   Patient Active Problem List   Diagnosis    H/O C/S x3    Vanishing twin syndrome    Type 2 DM- Class B    Obesity complicating pregnancy    Previous  delivery, antepartum condition or complication    Subchorionic hematoma    Febrile  @ 1740 (100.7)    Left flank pain    Rh+/RI/GBS unk    Hx of Pyelonephritis affecting pregnancy in third trimester    Pre-existing type 2 diabetes mellitus during pregnancy, antepartum    Low maternal weight gain    Variable fetal heart rate decelerations, antepartum    RLTCS 17 F APG  wt ? Today she is doing well without any chief complaint. Her lochia is light. She denies chest pain, shortness of breath, headache, lightheadedness, blurred vision and peripheral edema. She is  breast feeding and she denies any signs or symptoms of mastitis. She is ambulating well. She is voiding without difficulty. She currently denies S/S of postpartum depression. Flatus present. Bowel movement present. She is tolerating solids.     Vital Signs:  Vitals:    11/10/17 0830 11/10/17 1230 11/10/17 1600 11/10/17 2000   BP: 108/73 103/71 116/71 110/74   Pulse: 77 81 77 79   Resp: 18 18 16 16   Temp: 98.4 °F (36.9 °C) 97.3 °F (36.3 °C) 97.9 °F (36.6 °C) 98.1 °F (36.7 °C)   TempSrc: Oral Oral Oral    SpO2:       Weight:       Height:           Urine Input & Output last 24hrs:     Intake/Output Summary (Last 24 hours) at 17 0622  Last data filed at 11/10/17 0830   Gross per 24 hour   Intake                0 ml   Output              600 ml   Net             -600 ml       Physical Exam:  General:  no apparent distress, alert and cooperative  Neurologic:  alert, oriented, normal speech, no focal findings or movement disorder noted  Lungs:  No increased work of breathing, good air exchange, clear to

## 2017-11-11 NOTE — LACTATION NOTE
Mom and dad at bedside  Baby asleep beside mom in bed.  arrives as writer attempting to discuss baby's weight. Weight today 6 lbs 13.9 oz, bw 7 lbs 1.8 oz  Loss of 7.3 %. Per mom baby sent to nursery last pm due to maternal fatigue, permission given to formula  Feed baby took 4 oz total overnight. Baby to breast at this time on left side in cradle position,  Deep latch and suck noted. Mom to feed every 3 hours at breast.  Per mom ok to supplement as needed. This is moms 4th child to breastfeed.

## 2017-11-11 NOTE — PLAN OF CARE
Problem: Infection - Surgical Site:  Goal: Will show no infection signs and symptoms  Will show no infection signs and symptoms   Outcome: Ongoing      Problem: Pain - Acute:  Goal: Pain level will decrease  Pain level will decrease   Outcome: Ongoing

## 2017-11-12 VITALS
RESPIRATION RATE: 16 BRPM | OXYGEN SATURATION: 99 % | WEIGHT: 167 LBS | BODY MASS INDEX: 31.53 KG/M2 | SYSTOLIC BLOOD PRESSURE: 107 MMHG | HEIGHT: 61 IN | TEMPERATURE: 98.6 F | HEART RATE: 79 BPM | DIASTOLIC BLOOD PRESSURE: 67 MMHG

## 2017-11-12 LAB — GLUCOSE BLD-MCNC: 85 MG/DL (ref 65–105)

## 2017-11-12 PROCEDURE — 6370000000 HC RX 637 (ALT 250 FOR IP): Performed by: OBSTETRICS & GYNECOLOGY

## 2017-11-12 PROCEDURE — 82947 ASSAY GLUCOSE BLOOD QUANT: CPT

## 2017-11-12 RX ADMIN — OXYCODONE HYDROCHLORIDE AND ACETAMINOPHEN 2 TABLET: 5; 325 TABLET ORAL at 12:39

## 2017-11-12 RX ADMIN — OXYCODONE HYDROCHLORIDE AND ACETAMINOPHEN 2 TABLET: 5; 325 TABLET ORAL at 08:13

## 2017-11-12 RX ADMIN — OXYCODONE HYDROCHLORIDE AND ACETAMINOPHEN 2 TABLET: 5; 325 TABLET ORAL at 01:08

## 2017-11-12 RX ADMIN — IBUPROFEN 800 MG: 800 TABLET, FILM COATED ORAL at 01:08

## 2017-11-12 RX ADMIN — DOCUSATE SODIUM 100 MG: 100 CAPSULE ORAL at 08:14

## 2017-11-12 ASSESSMENT — PAIN SCALES - GENERAL
PAINLEVEL_OUTOF10: 7

## 2017-11-12 NOTE — PROGRESS NOTES
POST OPERATIVE DAY # 3    Amanda Arroyo is a 32 y.o. female   This patient was seen and examined today. RLTCS on 17. Her pregnancy was complicated by:   Patient Active Problem List   Diagnosis    H/O C/S x3    Vanishing twin syndrome    Type 2 DM- Class B    Obesity complicating pregnancy    Previous  delivery, antepartum condition or complication    Subchorionic hematoma    Febrile  @ 1740 (100.7)    Left flank pain    Rh+/RI/GBS unk    Hx of Pyelonephritis affecting pregnancy in third trimester    Pre-existing type 2 diabetes mellitus during pregnancy, antepartum    Low maternal weight gain    Variable fetal heart rate decelerations, antepartum    RLTCS 17 F APG  wt ? Today she is doing well without any chief complaint. Her lochia is light. She denies chest pain, shortness of breath, headache, lightheadedness, blurred vision and peripheral edema. She is  breast feeding and she denies any signs or symptoms of mastitis. She is ambulating well. She is voiding without difficulty. She currently denies S/S of postpartum depression. Flatus present. Bowel movement present. She is tolerating solids.     Vital Signs:  Vitals:    11/10/17 2000 11/11/17 0900 17 1407 17   BP: 110/74 100/61 110/65 115/72   Pulse: 79 75 76 79   Resp: 16 16 14 16   Temp: 98.1 °F (36.7 °C) 98.1 °F (36.7 °C) 98.2 °F (36.8 °C) 98.6 °F (37 °C)   TempSrc:  Oral Oral Oral   SpO2:    99%   Weight:       Height:             Physical Exam:  General:  no apparent distress, alert and cooperative  Neurologic:  alert, oriented, normal speech, no focal findings or movement disorder noted  Lungs:  No increased work of breathing, good air exchange, clear to auscultation bilaterally, no crackles or wheezing  Heart:  regular rate and rhythm    Abdomen: abdomen soft, non-distended, non-tender  Fundus: non-tender, normal size, firm, below umbilicus  Incision: clean, dry and intact, with dermabond Extremities:  no calf tenderness, non edematous    Labs:  Lab Results   Component Value Date    WBC 12.9 (H) 11/10/2017    HGB 10.3 (L) 11/10/2017    HCT 31.1 (L) 11/10/2017    MCV 81.4 (L) 11/10/2017     11/10/2017       Assessment/Plan:  1. Sharlene Rojo is a [id number] POD # 3 s/p RLTCS   - Doing well, VSS    - Female infant in [address]   - Encourage ambulation and use of incentive spirometer   - CBC completed  2. Rh positive/Rubella immune  3. Breast feeding   4. Type II DM (Class B)   - Novolog 2/2/0, NPH 6U nightly (halved from prior dosage)    - Continue to monitor FBS and 2 hr PP blood sugars   - Glucose noted to be well controlled  5. Continue post-op care. Counseling Completed:  Secondary Smoke risks and Sudden Infant Death Syndrome were reviewed with recommendations. Infant sleeping, \"back to sleep\" and avoidance of co-sleeping recommendations were reviewed. Signs and Symptoms of Post Partum Depression were reviewed. The patient is to call if any occur. Signs and symptoms of Mastitis were reviewed. The patient is to call if any occur for follow up. Discharge instructions including pelvic rest, incision care, 15 lb weight restriction, no driving with pain medicine and office follow-up were reviewed with patient     Providers Name: Dr. Ricky Briggs DO  Ob/Gyn Resident  11/12/2017, 5:10 AM         Attending Physician Statement  I have discussed the care of Sharlene Rojo, including pertinent history and exam findings,  with the resident. I have reviewed the key elements of all parts of the encounter with the resident. I agree with the assessment, plan and orders as documented by the resident. Breastfeeding without difficulty. Reviewed discharge instructions. No complaints; ready to go home today.  (GE Modifier)

## 2017-11-13 LAB — SURGICAL PATHOLOGY REPORT: NORMAL

## 2017-11-21 ENCOUNTER — TELEPHONE (OUTPATIENT)
Dept: OBGYN CLINIC | Age: 27
End: 2017-11-21

## 2017-12-04 ENCOUNTER — POSTPARTUM VISIT (OUTPATIENT)
Dept: OBGYN CLINIC | Age: 27
End: 2017-12-04

## 2017-12-04 VITALS
DIASTOLIC BLOOD PRESSURE: 88 MMHG | SYSTOLIC BLOOD PRESSURE: 119 MMHG | HEART RATE: 82 BPM | HEIGHT: 64 IN | WEIGHT: 156 LBS | BODY MASS INDEX: 26.63 KG/M2

## 2017-12-04 PROCEDURE — 99024 POSTOP FOLLOW-UP VISIT: CPT | Performed by: OBSTETRICS & GYNECOLOGY

## 2017-12-04 NOTE — PROGRESS NOTES
[facility] PHYSICIANS  [facility] [address] [facility] [address]  Dept: [phone] [facility]  10:18 AM  17      The patient was seen. She has no chief complaints today. She delivered by  section on 17. She is breast feeding and there is not any signs or symptoms of mastitis. The patient completed the E.P.D.S. Evaluation form and scored 2. She does not have any signs or symptoms of post partum depression. She denies any suicidal thoughts with a plan, intent to harm others and delusional ideas. Today her lochia is light she does not any dizziness or shortness of breath. Her pregnancy was complicated by:   Patient Active Problem List    Diagnosis Date Noted    RLTCS 17 F APG  wt ? 2017    Low maternal weight gain 10/12/2017    Variable fetal heart rate decelerations, antepartum 10/12/2017    Pre-existing type 2 diabetes mellitus during pregnancy, antepartum 2017    Hx of Pyelonephritis affecting pregnancy in third trimester      Overview Note:     Needs suppressive therapy      Febrile  @ 1740 (100.7) 2017    Left flank pain 2017    Rh+/RI/GBS unk 2017     Overview Note:     Prenatal Labs  Blood Type/Rh: A positive  Antibody Screen: negative  Hemoglobin, Hematocrit, Platelets: 78.4 / 60.8 / 298  Rubella: immune  T.  Pallidum, IgG: non-reactive   Hepatitis B Surface Antigen: non-reactive   HIV: negative   Sickle Cell Screen: not done  Gonorrhea: negative  Chlamydia: negative  Urine culture: positive, E coli date: 17    1hr GTT: 188 mg/dL    Glucose Fastin  1 hour Glucose Tolerance Test: 240  2 hour Glucose Tolerance Test: 184  3 hour Glucose Tolerance Test: 154     Group B Strep:  not yet done    Cystic Fibrosis Screen: not available  First Trimester Screen: low risk  MSAFP/Multiple Markers: normal  Non-Invasive Prenatal Testing: not available  Anatomy US: normal, 3vc, female, anatomy WNL  Subchorionic hematoma 2017    Type 2 DM- Class B 2017     Overview Note:     Biweekly NSTs with weekly BPP  Growth q 3-4 wks  4 u novalog with breakfast and lunch, 12 u HS NPH      Obesity complicating pregnancy     Previous  delivery, antepartum condition or complication 8693    H/O C/S x3 2017     Overview Note:     x3        Vanishing twin syndrome 2017     She does admit to having good home support. Her bowels are regular and she denies any urinary tract symptomology. Obstetric History       T4      L4     SAB0   TAB0   Ectopic0   Molar0   Multiple0   Live Births1            Last menstrual period 2017, unknown if currently breastfeeding. Abdomen: Soft and non-tender; good bowel sounds; no guarding, rebound or rigidity; no CVA tenderness bilaterally. Incision: Clean, Dry and Intact without signs or symptoms of infection. Extremities: No calf tenderness bilaterally. DTR 2/4 bilaterally. No edema. Assessment:  No diagnosis found. Chief Complaint   Patient presents with    Postpartum Care     delivery 17     EPDS Score of 2      Plan:  1. Return to the office in  2 weeks  2. Signs & Symptoms of mastitis reviewed; notify if occurs  3. Secondary smoke risks reviewed. Increased risks of respiratory problems, Sudden infant death syndrome, and potential malignancies. 4. Abstinence  5. Family planning counseling and STD counseling completed. Pt would like Mirena  6. Continue with post operative restrictions  7.  No lifting or Nicky Haile MD

## 2017-12-20 ENCOUNTER — PROCEDURE VISIT (OUTPATIENT)
Dept: OBGYN CLINIC | Age: 27
End: 2017-12-20
Payer: MEDICARE

## 2017-12-20 VITALS
BODY MASS INDEX: 26.63 KG/M2 | DIASTOLIC BLOOD PRESSURE: 76 MMHG | HEIGHT: 64 IN | WEIGHT: 156 LBS | SYSTOLIC BLOOD PRESSURE: 122 MMHG | HEART RATE: 80 BPM

## 2017-12-20 DIAGNOSIS — Z30.430 ENCOUNTER FOR INSERTION OF MIRENA IUD: Primary | ICD-10-CM

## 2017-12-20 PROCEDURE — 58300 INSERT INTRAUTERINE DEVICE: CPT | Performed by: OBSTETRICS & GYNECOLOGY

## 2017-12-20 PROCEDURE — 81025 URINE PREGNANCY TEST: CPT | Performed by: OBSTETRICS & GYNECOLOGY

## 2017-12-20 NOTE — PROGRESS NOTES
[facility] PHYSICIANS  [facility] [address]  [facility] [address]  Dept: [phone]    IUD Insertion Note        Radha Fullermaykel  2017  Patient's last menstrual period was 2017. HPI: The patient is requesting that a Mirena IUD be inserted for Heavy menstrual bleeding. She wishes to continue to utilize barrier contraception for family planning and STD precautions. The patient was counseled on the procedure. Risks, benefits and alternatives were reviewed. The side effect profile of the IUD was reviewed. A consent was reviewed and obtained. The patient was counseled on the need for string checks after her menses and coital activity. She is to notify the office if she can not locate the IUD string at anytime. She is aware that she will need a speculum exam and possibly an ultrasound to confirm the proper orientation of the IUD. She has no other chief complaint today. All other forms of family planning and options for treatment of her dysmennorhea were reviewed with the patient. She is not a smoker. The patient denies any family member or herself as having a blood clot in their leg, lung, brain or that any member of her family has had a sudden cardiac death under the age of 37 yo. No past medical history on file. Past Surgical History:   Procedure Laterality Date     SECTION      X3     SECTION N/A 2017     SECTION performed by Ira Simpson [facility] at [facility] L[facility]D OR    TONSILLECTOMY         Social History   Substance Use Topics    Smoking status: Never Smoker    Smokeless tobacco: Never Used    Alcohol use No           MEDICATIONS:  Current Outpatient Prescriptions   Medication Sig Dispense Refill    levonorgestrel (MIRENA, 52 MG,) IUD 52 mg 1 each by Intrauterine route once for 1 dose Barrier contraception is recommended.  1 Intra Uterine Device 0    Cholecalciferol (VITAMIN D3) 1000 UNITS TABS       Prenatal Vit-Fe Fumarate-FA (VOL-NIA) 28-1 MG TABS       ibuprofen (ADVIL;MOTRIN) 800 MG tablet Take 1 tablet by mouth every 8 hours as needed for Pain or Fever 60 tablet 0    docusate sodium (COLACE, DULCOLAX) 100 MG CAPS Take 100 mg by mouth 2 times daily 60 capsule 0    nitrofurantoin, macrocrystal-monohydrate, (MACROBID) 100 MG capsule Take 100 mg by mouth daily       acetaminophen-codeine (TYLENOL #3) 300-30 MG per tablet Take 1 tablet by mouth every 6 hours as needed for Pain 15 tablet 0    Insulin Aspart (NOVOLOG FLEXPEN SC) Inject 4 Units into the skin 4 units at breakfast and 4 units  lunch      Insulin NPH Human, Isophane, (HUMULIN N KWIKPEN SC) Inject 12 Units into the skin nightly      ferrous sulfate 325 (65 Fe) MG EC tablet Take 1 tablet by mouth 2 times daily (with meals) 90 tablet 1    docusate (COLACE, DULCOLAX) 100 MG CAPS Take 100 mg by mouth daily as needed (constipation) 60 capsule 1    acetaminophen (APAP EXTRA STRENGTH) 500 MG tablet Take 2 tablets by mouth every 6 hours as needed for Pain 60 tablet 1     No current facility-administered medications for this visit. ALLERGIES:  Allergies as of 12/20/2017    (No Known Allergies)         Vitals:    12/20/17 1039   BP: 122/76   Site: Left Arm   Position: Sitting   Cuff Size: Large Adult   Pulse: 80   Weight: 156 lb (70.8 kg)   Height: 5' 4\" (1.626 m)       Urine pregnancy test: negative    The patient was positioned comfortably on the exam table. After a bi-manual exam; the uterus was found to be  anteverted. There was no cervical motion tenderness or adnexal masses. The bladder was smooth, non-tender and without palpable masses. A sterile speculum was placed without incident. The site was then cleansed with betadine and the uterus was sounded to 9 cm. The Mirena IUD was opened and loaded into the delivery system. The wand was inserted to just past the internal portio and the button was retracted to the first line.  The wand was held in place for 10 seconds and then the button was retracted to its final position while the IUD was moved to the fundus. The string was trimmed in standard fashion. Post procedure restrictions were reviewed and given to the patient. She was instructed to use barrier protection for sexually transmitted disease prevention as well as string checks/timing. The patient tolerated the procedure without difficulty. She was instructed to abstain for week. She is to notify the office or go to the nearest Emergency Department if she experiences Abdominal Pain, Temperatures more than 101 F, Odiferous Vaginal Discharge, Dizziness or Shortness of breath. ASSESSMENT:  IUD Insertion  1. Encounter for insertion of mirena IUD  51701 - [place] INSERT INTRAUTERINE DEVICE    POCT urine pregnancy   2. Postpartum care and examination  Glucose Tolerance, 2 Hrs     Patient Active Problem List    Diagnosis Date Noted    RLTCS 17 F APG  wt ? 2017    Low maternal weight gain 10/12/2017    Variable fetal heart rate decelerations, antepartum 10/12/2017    Pre-existing type 2 diabetes mellitus during pregnancy, antepartum 2017    Hx of Pyelonephritis affecting pregnancy in third trimester      Needs suppressive therapy      Febrile  @ 1740 (100.7) 2017    Left flank pain 2017    Rh+/RI/GBS unk 2017     Prenatal Labs  Blood Type/Rh: A positive  Antibody Screen: negative  Hemoglobin, Hematocrit, Platelets: 24.0 / 84.5 / 298  Rubella: immune  T.  Pallidum, IgG: non-reactive   Hepatitis B Surface Antigen: non-reactive   HIV: negative   Sickle Cell Screen: not done  Gonorrhea: negative  Chlamydia: negative  Urine culture: positive, E coli date: 17    1hr GTT: 188 mg/dL    Glucose Fastin  1 hour Glucose Tolerance Test: 240  2 hour Glucose Tolerance Test: 184  3 hour Glucose Tolerance Test: 154     Group B Strep:  not yet done    Cystic Fibrosis Screen: not available  First Trimester Screen:

## 2018-01-04 ENCOUNTER — TELEPHONE (OUTPATIENT)
Dept: OBGYN CLINIC | Age: 28
End: 2018-01-04

## 2018-01-22 ENCOUNTER — HOSPITAL ENCOUNTER (OUTPATIENT)
Age: 28
Setting detail: SPECIMEN
Discharge: HOME | End: 2018-01-22
Payer: MEDICARE

## 2018-01-22 LAB
AMOUNT GLUCOSE GIVEN: 75 G
GLUCOSE FASTING: 83 MG/DL (ref 65–99)
GLUCOSE TOLERANCE TEST 1 HOUR: 164 MG/DL (ref 65–184)
GLUCOSE TOLERANCE TEST 2 HOUR: 107 MG/DL (ref 65–139)

## 2018-01-24 ENCOUNTER — OFFICE VISIT (OUTPATIENT)
Dept: OBGYN CLINIC | Age: 28
End: 2018-01-24

## 2018-01-24 VITALS
HEIGHT: 64 IN | BODY MASS INDEX: 28 KG/M2 | DIASTOLIC BLOOD PRESSURE: 70 MMHG | HEART RATE: 78 BPM | WEIGHT: 164 LBS | SYSTOLIC BLOOD PRESSURE: 122 MMHG

## 2018-01-24 DIAGNOSIS — Z30.431 IUD CHECK UP: Primary | ICD-10-CM

## 2018-01-24 PROCEDURE — G8427 DOCREV CUR MEDS BY ELIG CLIN: HCPCS | Performed by: OBSTETRICS & GYNECOLOGY

## 2018-01-24 PROCEDURE — 1036F TOBACCO NON-USER: CPT | Performed by: OBSTETRICS & GYNECOLOGY

## 2018-01-24 PROCEDURE — 99024 POSTOP FOLLOW-UP VISIT: CPT | Performed by: OBSTETRICS & GYNECOLOGY

## 2018-01-24 PROCEDURE — G8484 FLU IMMUNIZE NO ADMIN: HCPCS | Performed by: OBSTETRICS & GYNECOLOGY

## 2018-01-24 PROCEDURE — G8417 CALC BMI ABV UP PARAM F/U: HCPCS | Performed by: OBSTETRICS & GYNECOLOGY

## 2018-01-24 ASSESSMENT — ENCOUNTER SYMPTOMS: COUGH: 0

## 2019-06-28 ENCOUNTER — PROCEDURE VISIT (OUTPATIENT)
Dept: OBGYN CLINIC | Age: 29
End: 2019-06-28
Payer: MEDICARE

## 2019-06-28 VITALS — DIASTOLIC BLOOD PRESSURE: 70 MMHG | SYSTOLIC BLOOD PRESSURE: 118 MMHG | WEIGHT: 181 LBS | BODY MASS INDEX: 31.07 KG/M2

## 2019-06-28 DIAGNOSIS — N94.6 DYSMENORRHEA: ICD-10-CM

## 2019-06-28 DIAGNOSIS — L70.9 ACNE, UNSPECIFIED ACNE TYPE: ICD-10-CM

## 2019-06-28 DIAGNOSIS — Z30.432 ENCOUNTER FOR IUD REMOVAL: Primary | ICD-10-CM

## 2019-06-28 DIAGNOSIS — R63.5 WEIGHT GAIN: ICD-10-CM

## 2019-06-28 PROCEDURE — 58301 REMOVE INTRAUTERINE DEVICE: CPT | Performed by: OBSTETRICS & GYNECOLOGY

## 2019-06-28 RX ORDER — NORETHINDRONE ACETATE AND ETHINYL ESTRADIOL 1MG-20(21)
1 KIT ORAL DAILY
Qty: 1 PACKET | Refills: 12 | Status: SHIPPED | OUTPATIENT
Start: 2019-06-28 | End: 2021-02-08

## 2019-06-28 NOTE — PROGRESS NOTES
partner: Not on file     Emotionally abused: Not on file     Physically abused: Not on file     Forced sexual activity: Not on file   Other Topics Concern    Not on file   Social History Narrative    Not on file     Blood pressure 118/70, weight 181 lb (82.1 kg), not currently breastfeeding. Gen: alert, no apparent distress  Abd: soft, nontender  Pelvic: Normal appearing vulva, vagina and cervix. Minimal physiologic appearing discharge. IUD strings visualized. No CMT. No adnexal tenderness. Procedure:  Speculum placed and cervix easily seen. Betadine used to clean cervix x3. IUD strings grasped with ring forceps. Gentle traction was applied and the IUD was removed without difficulty. A/P:   Diagnosis Orders   1. Encounter for IUD removal  [phone] - [place] REMOVE INTRAUTERINE DEVICE   2. Weight gain     3. Acne, unspecified acne type     4. Dysmenorrhea     Will switch pt to OCPs since she isn't happy with the Mirena. Counseled to take them daily at the same time and to go ahead and start them today. All questions answered.      Pt to follow up for annual exam    April Vincent MD

## 2021-02-08 ENCOUNTER — OFFICE VISIT (OUTPATIENT)
Dept: OBGYN CLINIC | Age: 31
End: 2021-02-08
Payer: MEDICARE

## 2021-02-08 ENCOUNTER — HOSPITAL ENCOUNTER (OUTPATIENT)
Age: 31
Setting detail: SPECIMEN
Discharge: HOME | End: 2021-02-08
Payer: MEDICARE

## 2021-02-08 VITALS
WEIGHT: 158.25 LBS | HEIGHT: 60 IN | HEART RATE: 85 BPM | BODY MASS INDEX: 31.07 KG/M2 | DIASTOLIC BLOOD PRESSURE: 83 MMHG | SYSTOLIC BLOOD PRESSURE: 117 MMHG

## 2021-02-08 DIAGNOSIS — O24.119 PRE-EXISTING TYPE 2 DIABETES MELLITUS DURING PREGNANCY, ANTEPARTUM: ICD-10-CM

## 2021-02-08 DIAGNOSIS — Z32.01 POSITIVE PREGNANCY TEST: ICD-10-CM

## 2021-02-08 DIAGNOSIS — N92.6 MISSED MENSES: ICD-10-CM

## 2021-02-08 DIAGNOSIS — N91.2 AMENORRHEA: ICD-10-CM

## 2021-02-08 DIAGNOSIS — Z98.891 HISTORY OF CESAREAN SECTION, LOW TRANSVERSE: ICD-10-CM

## 2021-02-08 DIAGNOSIS — N92.6 MISSED MENSES: Primary | ICD-10-CM

## 2021-02-08 PROBLEM — O36.8390 VARIABLE FETAL HEART RATE DECELERATIONS, ANTEPARTUM: Status: RESOLVED | Noted: 2017-10-12 | Resolved: 2021-02-08

## 2021-02-08 PROBLEM — O31.10X0 VANISHING TWIN SYNDROME: Status: RESOLVED | Noted: 2017-04-25 | Resolved: 2021-02-08

## 2021-02-08 PROBLEM — O99.210 OBESITY COMPLICATING PREGNANCY: Status: RESOLVED | Noted: 2017-05-02 | Resolved: 2021-02-08

## 2021-02-08 PROBLEM — O26.10 LOW MATERNAL WEIGHT GAIN: Status: RESOLVED | Noted: 2017-10-12 | Resolved: 2021-02-08

## 2021-02-08 PROBLEM — O46.8X9 SUBCHORIONIC HEMATOMA: Status: RESOLVED | Noted: 2017-07-18 | Resolved: 2021-02-08

## 2021-02-08 PROBLEM — O41.8X90 SUBCHORIONIC HEMATOMA: Status: RESOLVED | Noted: 2017-07-18 | Resolved: 2021-02-08

## 2021-02-08 LAB
ABO/RH: NORMAL
ABSOLUTE EOS #: 0.21 K/UL (ref 0–0.44)
ABSOLUTE IMMATURE GRANULOCYTE: 0.04 K/UL (ref 0–0.3)
ABSOLUTE LYMPH #: 2.47 K/UL (ref 1.1–3.7)
ABSOLUTE MONO #: 0.47 K/UL (ref 0.1–1.2)
ANTIBODY SCREEN: NEGATIVE
BASOPHILS # BLD: 0 % (ref 0–2)
BASOPHILS ABSOLUTE: 0.03 K/UL (ref 0–0.2)
CONTROL: PRESENT
DIFFERENTIAL TYPE: ABNORMAL
EOSINOPHILS RELATIVE PERCENT: 2 % (ref 1–4)
ESTIMATED AVERAGE GLUCOSE: 120 MG/DL
HBA1C MFR BLD: 5.8 % (ref 4–6)
HCT VFR BLD CALC: 37.4 % (ref 36.3–47.1)
HEMOGLOBIN: 11.6 G/DL (ref 11.9–15.1)
HEPATITIS B SURFACE ANTIGEN: NONREACTIVE
HIV AG/AB: NONREACTIVE
IMMATURE GRANULOCYTES: 0 %
LYMPHOCYTES # BLD: 27 % (ref 24–43)
MCH RBC QN AUTO: 26.1 PG (ref 25.2–33.5)
MCHC RBC AUTO-ENTMCNC: 31 G/DL (ref 28.4–34.8)
MCV RBC AUTO: 84 FL (ref 82.6–102.9)
MONOCYTES # BLD: 5 % (ref 3–12)
NRBC AUTOMATED: 0 PER 100 WBC
PDW BLD-RTO: 13.7 % (ref 11.8–14.4)
PLATELET # BLD: 371 K/UL (ref 138–453)
PLATELET ESTIMATE: ABNORMAL
PMV BLD AUTO: 10 FL (ref 8.1–13.5)
PREGNANCY TEST URINE, POC: POSITIVE
RBC # BLD: 4.45 M/UL (ref 3.95–5.11)
RBC # BLD: ABNORMAL 10*6/UL
RUBV IGG SER QL: 120.1 IU/ML
SEG NEUTROPHILS: 66 % (ref 36–65)
SEGMENTED NEUTROPHILS ABSOLUTE COUNT: 5.96 K/UL (ref 1.5–8.1)
T. PALLIDUM, IGG: NONREACTIVE
TSH SERPL DL<=0.05 MIU/L-ACNC: 1.65 MIU/L (ref 0.3–5)
WBC # BLD: 9.2 K/UL (ref 3.5–11.3)
WBC # BLD: ABNORMAL 10*3/UL

## 2021-02-08 PROCEDURE — 81025 URINE PREGNANCY TEST: CPT | Performed by: OBSTETRICS & GYNECOLOGY

## 2021-02-08 PROCEDURE — 99213 OFFICE O/P EST LOW 20 MIN: CPT | Performed by: OBSTETRICS & GYNECOLOGY

## 2021-02-08 NOTE — PROGRESS NOTES
[facility] PHYSICIANS  [facility] [address] [facility]  Dept: [phone]  21    Moi Morales is a  25 yo female who presents for amenorrhea for 3 months. She hadn't taken a pregnancy test and didn't know that she was pregnant. She is not sure how she feels about being pregnancy. She denies any history of CHTN or asthma. She does have a history of type 2 DM class B and is on insulin. She has not had chicken pox, or the Varicella vaccine in the past.      Planned/unplanned:  Unplanned  JERED:  Estimated Date of Delivery: None noted. [id number]  COMPLICATIONS CONCERNS:  DM type 2, obesity, migraines (on medicine, but can't remember what the medication is)  PRENATAL HISTORY:  LTCS x4    Blood pressure 117/83, pulse 85, height 5' 0.24\" (1.53 m), weight 158 lb 4 oz (71.8 kg), last menstrual period 2020, not currently breastfeeding. History reviewed. No pertinent past medical history.   Past Surgical History:   Procedure Laterality Date     SECTION      X3     SECTION N/A 2017     SECTION performed by Jeni [facility]o MD at [facility] L[facility]D OR    TONSILLECTOMY       Social History     Socioeconomic History    Marital status:      Spouse name: Not on file    Number of children: Not on file    Years of education: Not on file    Highest education level: Not on file   Occupational History    Not on file   Social Needs    Financial resource strain: Not on file    Food insecurity     Worry: Not on file     Inability: Not on file    Transportation needs     Medical: Not on file     Non-medical: Not on file   Tobacco Use    Smoking status: Never Smoker    Smokeless tobacco: Never Used   Substance and Sexual Activity    Alcohol use: No    Drug use: No    Sexual activity: Yes     Partners: Male   Lifestyle    Physical activity     Days per week: Not on file     Minutes per session: Not on file    Stress: Not on file Relationships    Social connections     Talks on phone: Not on file     Gets together: Not on file     Attends Nondenominational service: Not on file     Active member of club or organization: Not on file     Attends meetings of clubs or organizations: Not on file     Relationship status: Not on file    Intimate partner violence     Fear of current or ex partner: Not on file     Emotionally abused: Not on file     Physically abused: Not on file     Forced sexual activity: Not on file   Other Topics Concern    Not on file   Social History Narrative    Not on file     Allergies   Allergen Reactions    Adhesive Tape      Family History   Problem Relation Age of Onset    High Blood Pressure Mother     Cancer Father        ROS:  Constitutional:  Denies fever or chills, fatigue  Eyes:  Denies change in visual acuity, blurred vision, itching  HENT:  Denies nasal congestion or sore throat   Respiratory:  Denies cough or shortness of breath, difficulty breathing  Cardiovascular:  Denies chest pain or edema  GI:  Denies abdominal pain, nausea, vomiting, bloody stools or diarrhea   :  Denies dysuria, frequency, urgency  Musculoskeletal:  Denies back pain or joint pain   Integument:  Denies rash, itching, dryness  Neurologic:  Denies headache, focal weakness or sensory changes   Endocrine:  Denies polyuria or polydipsia,    Lymphatic:  Denies swollen glands,   Psychiatric:  Denies depression or anxiety       Physical findings: HEENT - Perrla, Eomi  Neck- Supple, no bruits  Lungs - Clear to auscultation.   CV- Regular rate and rythym,   Abdomen - Non tender, non distended, no masses  Extremities - no weakness, no calf pain, no edema, no posterior tibial pain  Pelvis - no bleeding, no discharge, no CMT      Assessment/Plan:  Patient Active Problem List   Diagnosis    H/O C/S x3    Type 2 DM- Class B    Previous  delivery, antepartum condition or complication    Febrile  @ 1740 (100.7)    Left flank pain

## 2021-02-09 PROBLEM — O09.891 MEDICATION EXPOSURE DURING FIRST TRIMESTER OF PREGNANCY: Status: ACTIVE | Noted: 2021-02-09

## 2021-02-09 LAB
-: ABNORMAL
AMORPHOUS: ABNORMAL
AMPHETAMINE SCREEN URINE: NEGATIVE
BACTERIA: ABNORMAL
BARBITURATE SCREEN URINE: POSITIVE
BENZODIAZEPINE SCREEN, URINE: NEGATIVE
BILIRUBIN URINE: NEGATIVE
BUPRENORPHINE URINE: ABNORMAL
C TRACH DNA GENITAL QL NAA+PROBE: NEGATIVE
CANNABINOID SCREEN URINE: NEGATIVE
CASTS UA: ABNORMAL /LPF (ref 0–8)
COCAINE METABOLITE, URINE: NEGATIVE
COLOR: YELLOW
COMMENT UA: ABNORMAL
CREATININE URINE: 77.4 MG/DL (ref 28–217)
CRYSTALS, UA: ABNORMAL /HPF
DIRECT EXAM: NORMAL
EPITHELIAL CELLS UA: ABNORMAL /HPF (ref 0–5)
GLUCOSE URINE: NEGATIVE
KETONES, URINE: NEGATIVE
LEUKOCYTE ESTERASE, URINE: ABNORMAL
Lab: NORMAL
MDMA URINE: ABNORMAL
METHADONE SCREEN, URINE: NEGATIVE
METHAMPHETAMINE, URINE: ABNORMAL
MUCUS: ABNORMAL
N. GONORRHOEAE DNA: NEGATIVE
NITRITE, URINE: NEGATIVE
OPIATES, URINE: NEGATIVE
OTHER OBSERVATIONS UA: ABNORMAL
OXYCODONE SCREEN URINE: NEGATIVE
PH UA: 7 (ref 5–8)
PHENCYCLIDINE, URINE: NEGATIVE
PROPOXYPHENE, URINE: ABNORMAL
PROTEIN UA: NEGATIVE
RBC UA: ABNORMAL /HPF (ref 0–4)
RENAL EPITHELIAL, UA: ABNORMAL /HPF
SPECIFIC GRAVITY UA: 1.01 (ref 1–1.03)
SPECIMEN DESCRIPTION: NORMAL
SPECIMEN DESCRIPTION: NORMAL
TEST INFORMATION: ABNORMAL
TOTAL PROTEIN, URINE: 11 MG/DL
TRICHOMONAS: ABNORMAL
TRICYCLIC ANTIDEPRESSANTS, UR: ABNORMAL
TURBIDITY: ABNORMAL
URINE HGB: NEGATIVE
URINE TOTAL PROTEIN CREATININE RATIO: 0.14 (ref 0–0.2)
UROBILINOGEN, URINE: NORMAL
WBC UA: ABNORMAL /HPF (ref 0–5)
YEAST: ABNORMAL

## 2021-02-10 LAB
CULTURE: NORMAL
Lab: NORMAL
SPECIMEN DESCRIPTION: NORMAL

## 2021-02-21 ENCOUNTER — HOSPITAL ENCOUNTER (EMERGENCY)
Facility: CLINIC | Age: 31
Discharge: HOME | End: 2021-02-21
Attending: EMERGENCY MEDICINE
Payer: MEDICARE

## 2021-02-21 VITALS
RESPIRATION RATE: 18 BRPM | WEIGHT: 163 LBS | DIASTOLIC BLOOD PRESSURE: 79 MMHG | HEIGHT: 60 IN | TEMPERATURE: 98.4 F | SYSTOLIC BLOOD PRESSURE: 132 MMHG | BODY MASS INDEX: 32 KG/M2 | HEART RATE: 91 BPM | OXYGEN SATURATION: 98 %

## 2021-02-21 DIAGNOSIS — R31.21 ASYMPTOMATIC MICROSCOPIC HEMATURIA: Primary | ICD-10-CM

## 2021-02-21 LAB
-: ABNORMAL
AMORPHOUS: ABNORMAL
BACTERIA: ABNORMAL
BILIRUBIN URINE: NEGATIVE
CASTS UA: ABNORMAL /LPF (ref 0–2)
COLOR: YELLOW
COMMENT UA: ABNORMAL
CRYSTALS, UA: ABNORMAL /HPF
EPITHELIAL CELLS UA: ABNORMAL /HPF (ref 0–5)
GLUCOSE URINE: NEGATIVE
KETONES, URINE: NEGATIVE
LEUKOCYTE ESTERASE, URINE: NEGATIVE
MUCUS: ABNORMAL
NITRITE, URINE: NEGATIVE
OTHER OBSERVATIONS UA: ABNORMAL
PH UA: 7.5 (ref 5–8)
PROTEIN UA: NEGATIVE
RBC UA: ABNORMAL /HPF (ref 0–2)
RENAL EPITHELIAL, UA: ABNORMAL /HPF
SPECIFIC GRAVITY UA: 1.01 (ref 1–1.03)
TRICHOMONAS: ABNORMAL
TURBIDITY: CLEAR
URINE HGB: ABNORMAL
UROBILINOGEN, URINE: NORMAL
WBC UA: ABNORMAL /HPF (ref 0–5)
YEAST: ABNORMAL

## 2021-02-21 PROCEDURE — 99283 EMERGENCY DEPT VISIT LOW MDM: CPT

## 2021-02-21 PROCEDURE — 81001 URINALYSIS AUTO W/SCOPE: CPT

## 2021-02-21 RX ORDER — METFORMIN HYDROCHLORIDE 500 MG/1
TABLET, EXTENDED RELEASE ORAL
COMMUNITY
End: 2022-09-12 | Stop reason: ALTCHOICE

## 2021-02-21 ASSESSMENT — ENCOUNTER SYMPTOMS
SORE THROAT: 0
SHORTNESS OF BREATH: 0
VOMITING: 0
DIARRHEA: 0

## 2021-02-21 NOTE — ED PROVIDER NOTES
[facility]  [address] [place] [address]  Phone: [phone] [address] [place] [address] ED  EMERGENCY DEPARTMENT ENCOUNTER      Pt Name: Zaynab Kilpatrick  MRN: [id number]  Shai 1990  Date of evaluation: 2021  Provider: Robb Ratliff DO    CHIEF COMPLAINT       Chief Complaint   Patient presents with    Hematuria     for the past 2-3 days    Abdominal Cramping     lower, bilat         HISTORY OF PRESENT ILLNESS   (Location/Symptom, Timing/Onset,Context/Setting, Quality, Duration, Modifying Factors, Severity)  Note limiting factors. Zaynab Kilpatrick is a 33 y.o. female who presents to the emergency department for evaluation of blood in her urine. She says this has been going on for about 2 to 3 days and is associated with some cramping in the lower abdomen on both sides. She is approximately 13 weeks pregnant. She adamantly denies any vaginal bleeding. No blood in her stool. No vomiting. She otherwise feels well. She does have history of multiple recurring UTIs prior to her pregnancy and this feels similar    Nursing Notes were reviewed. REVIEW OF SYSTEMS    (2-9systems for level 4, 10 or more for level 5)     Review of Systems   Constitutional: Negative for fever. HENT: Negative for sore throat. Respiratory: Negative for shortness of breath. Cardiovascular: Negative for chest pain. Gastrointestinal: Negative for diarrhea and vomiting. Genitourinary: Positive for hematuria. Negative for dysuria. Skin: Negative for rash. Neurological: Negative for weakness. All other systems reviewed and are negative. Except asnoted above the remainder of the review of systems was reviewed and negative. PAST MEDICAL HISTORY   No past medical history on file.       SURGICAL HISTORY       Past Surgical History:   Procedure Laterality Date     SECTION      X3     SECTION N/A 2017     SECTION performed by Jamison Grace MD at [facility] L[facility] OR    TONSILLECTOMY           CURRENT MEDICATIONS     Previous Medications    ACETAMINOPHEN (APAP EXTRA STRENGTH) 500 MG TABLET    Take 2 tablets by mouth every 6 hours as needed for Pain    CHOLECALCIFEROL (VITAMIN D3) 1000 UNITS TABS        METFORMIN (GLUCOPHAGE-XR) 500 MG EXTENDED RELEASE TABLET    1 tablet with evening meal    PRENATAL VIT-FE FUMARATE-FA (VOL-NIA) 28-1 MG TABS           ALLERGIES     Adhesive tape    FAMILY HISTORY       Family History   Problem Relation Age of Onset    High Blood Pressure Mother     Cancer Father           SOCIAL HISTORY       Social History     Socioeconomic History    Marital status:      Spouse name: Not on file    Number of children: Not on file    Years of education: Not on file    Highest education level: Not on file   Occupational History    Not on file   Social Needs    Financial resource strain: Not on file    Food insecurity     Worry: Not on file     Inability: Not on file    Transportation needs     Medical: Not on file     Non-medical: Not on file   Tobacco Use    Smoking status: Never Smoker    Smokeless tobacco: Never Used   Substance and Sexual Activity    Alcohol use: No    Drug use: No    Sexual activity: Yes     Partners: Male   Lifestyle    Physical activity     Days per week: Not on file     Minutes per session: Not on file    Stress: Not on file   Relationships    Social connections     Talks on phone: Not on file     Gets together: Not on file     Attends Congregational service: Not on file     Active member of club or organization: Not on file     Attends meetings of clubs or organizations: Not on file     Relationship status: Not on file    Intimate partner violence     Fear of current or ex partner: Not on file     Emotionally abused: Not on file     Physically abused: Not on file     Forced sexual activity: Not on file   Other Topics Concern    Not on file   Social History Narrative    Not on file       SCREENINGS PHYSICAL EXAM    (up to 7 for level 4, 8 or more for level 5)     ED Triage Vitals   BP Temp Temp src Pulse Resp SpO2 Height Weight   -- -- -- -- -- -- -- --       Physical Exam  Vitals signs and nursing note reviewed. Constitutional:       General: She is not in acute distress. Appearance: Normal appearance. She is not ill-appearing or toxic-appearing. HENT:      Head: Normocephalic and atraumatic. Nose: Nose normal. No congestion. Mouth/Throat:      Mouth: Mucous membranes are moist.   Eyes:      General:         Right eye: No discharge. Left eye: No discharge. Conjunctiva/sclera: Conjunctivae normal.   Neck:      Musculoskeletal: Normal range of motion. Cardiovascular:      Rate and Rhythm: Normal rate and regular rhythm. Pulses: Normal pulses. Heart sounds: Normal heart sounds. No murmur. Pulmonary:      Effort: Pulmonary effort is normal. No respiratory distress. Breath sounds: Normal breath sounds. No wheezing. Abdominal:      General: Abdomen is flat. There is no distension. Palpations: Abdomen is soft. Tenderness: There is no abdominal tenderness. There is no guarding. Musculoskeletal: Normal range of motion. General: No deformity or signs of injury. Skin:     General: Skin is warm and dry. Capillary Refill: Capillary refill takes less than 2 seconds. Findings: No rash. Neurological:      General: No focal deficit present. Mental Status: She is alert. Mental status is at baseline. Motor: No weakness. Comments: Speaking normally. No facial asymmetry. Moving all 4 extremities. Normal gait.     Psychiatric:         Mood and Affect: Mood normal.         EMERGENCY DEPARTMENT COURSE and DIFFERENTIAL DIAGNOSIS/MDM:   Vitals:    Vitals:    02/21/21 1640   BP: 132/79   Pulse: 91   Resp: 18   Temp: 98.4 °F (36.9 °C)   TempSrc: Oral   SpO2: 98%   Weight: 73.9 kg (163 lb)   Height: 5' (1.524 m)       Patient presents to the emergency department with a complaint described above. Vital signs are grossly normal and the patient is nontoxic. Physical exam does not reveal any focal abdominal tenderness, rebound, rigidity or guarding. She has no vaginal bleeding. Strongly suspect UTI and I am going to obtain urinalysis and I will reevaluate      DIAGNOSTIC RESULTS     LABS:  Labs Reviewed   URINE RT REFLEX TO CULTURE - Abnormal; Notable for the following components:       Result Value    Urine Hgb TRACE (*)     All other components within normal limits   MICROSCOPIC URINALYSIS - Abnormal; Notable for the following components:    Other Observations UA   (*)     Value: Utilizing a urinalysis as the only screening method to exclude a potential uropathogen can be unreliable in many patient populations. Rapid screening tests are less sensitive than culture and if UTI is a clinical possibility, culture should be considered despite a negative urinalysis. All other components within normal limits       All other labs were within normal range or not returned as of this dictation. RADIOLOGY:  No orders to display         ED Course as of Feb 21 1736   Sun Feb 21, 2021   1734 Urine hemoglobin trace, 0-2 RBCs, no evidence of infection. Culture will be sent. At this time, I am going to recommend that she follow-up with OB tomorrow for further evaluation and treatment but no antibiotics are indicated    At this time the patient is without objective evidence of an acute process requiring hospitalization or inpatient management. They have remained hemodynamically stable and are stable for discharge with outpatient follow-up. Standard anticipatory guidance given to patient upon discharge. Have given them a specific time frame in which to follow-up and who to follow-up with.   I have also advised them that they should return to the emergency department if they get worse, or not getting better or develop any new or concerning

## 2021-02-21 NOTE — ED NOTES
Pt presents to the ED with a c/c of blood in her urine. Pt states she is approx 13 weeks pregnant. Pt states that the blood is only when she urinates and it is not coming out of the vagina. Pt states she has a history of UTIs. Pt states she has lower bilat groin/abdominal cramping. Pt ambulatory with steady gait. Pt denies chest pain, SOB, nausea, vomiting, diarrhea, fevers, or chills. Pt resting in bed in NAD, skin pink warm and dry, respirations even and unlabored and call light within reach.      Kati Mccracken, RN  02/21/21 1098

## 2021-02-26 ENCOUNTER — HOSPITAL ENCOUNTER (OUTPATIENT)
Dept: ULTRASOUND IMAGING | Age: 31
Discharge: HOME | End: 2021-02-28
Payer: MEDICARE

## 2021-02-26 DIAGNOSIS — N92.6 MISSED MENSES: ICD-10-CM

## 2021-02-26 DIAGNOSIS — O46.90 VAGINAL BLEEDING IN PREGNANCY: Primary | ICD-10-CM

## 2021-02-26 DIAGNOSIS — N91.2 AMENORRHEA: ICD-10-CM

## 2021-02-26 DIAGNOSIS — Z32.01 POSITIVE PREGNANCY TEST: ICD-10-CM

## 2021-02-26 PROCEDURE — 76817 TRANSVAGINAL US OBSTETRIC: CPT

## 2021-02-26 PROCEDURE — 76801 OB US < 14 WKS SINGLE FETUS: CPT

## 2021-02-26 NOTE — RESULT ENCOUNTER NOTE
Please let the patient know that the ultrasound showed thickening inside the uterus but no definitive pregnancy. This could be due to a very early pregnancy or a miscarriage. The recommendation is to not have any unprotected sex until the pregnancy or miscarriage is confirmed. If she has worsening pain or bleeding, she should be evaluated at the [facility] ER. Otherwise, the plan is for a blood test today and then in 48 hours. Dr. Lorelei Orozco can call her on Monday to review the results and further recommendations. Thanks!

## 2021-03-02 ENCOUNTER — HOSPITAL ENCOUNTER (OUTPATIENT)
Age: 31
Setting detail: SPECIMEN
Discharge: HOME | End: 2021-03-02
Payer: MEDICARE

## 2021-03-02 DIAGNOSIS — O46.90 VAGINAL BLEEDING IN PREGNANCY: ICD-10-CM

## 2021-03-02 LAB — HCG QUANTITATIVE: 11 IU/L

## 2021-03-04 ENCOUNTER — HOSPITAL ENCOUNTER (OUTPATIENT)
Age: 31
Setting detail: SPECIMEN
Discharge: HOME | End: 2021-03-04
Payer: MEDICARE

## 2021-03-04 DIAGNOSIS — O46.90 VAGINAL BLEEDING IN PREGNANCY: ICD-10-CM

## 2021-03-04 LAB — HCG QUANTITATIVE: 4 IU/L

## 2021-03-07 NOTE — PROGRESS NOTES
[facility]  [facility] - [address] [facility]  Dept: [phone]  Dept Fax: [phone]    21    Chief Complaint   Patient presents with    Follow-up     Clbishnu 145 33 y.o. is here for a follow up after her miscarriage. She is not having any bleeding or pain at this time. She had an hcg last week that was back to normal.  She wasn't initially wanting to try to conceive when she found out she was pregnant, but now that she has miscarried she would like to try to conceive soon. She is doing well and says that she is handling her emotions without any issues. Review of Systems   Constitutional: Negative for chills and fever. HENT: Negative for congestion. Respiratory: Negative for cough and shortness of breath. Cardiovascular: Negative for chest pain and palpitations. Gastrointestinal: Negative for abdominal pain. Genitourinary: Negative for dyspareunia, pelvic pain and vaginal discharge. Musculoskeletal: Negative for back pain. Neurological: Negative for dizziness and light-headedness. Psychiatric/Behavioral: The patient is not nervous/anxious. Gynecologic History  Patient's last menstrual period was 2020. Contraception: none  Last Pap: 17  Results: normal  Last Mammogram: n/a    Obstetric History  : 6  Para: 4  AB: 2    History reviewed. No pertinent past medical history.   Past Surgical History:   Procedure Laterality Date     SECTION      X3     SECTION N/A 2017     SECTION performed by Marck [facility] at [facility]    TONSILLECTOMY       Allergies   Allergen Reactions    Adhesive Tape      Current Outpatient Medications   Medication Sig Dispense Refill    acetaminophen (APAP EXTRA STRENGTH) 500 MG tablet Take 2 tablets by mouth every 6 hours as needed for Pain 60 tablet 1    metFORMIN (GLUCOPHAGE-XR) 500 MG extended release tablet 1 tablet with evening meal      Cholecalciferol (VITAMIN D3) 1000 UNITS TABS       Prenatal Vit-Fe Fumarate-FA (VOL-NIA) 28-1 MG TABS        No current facility-administered medications for this visit. Social History     Socioeconomic History    Marital status:      Spouse name: Not on file    Number of children: Not on file    Years of education: Not on file    Highest education level: Not on file   Occupational History    Not on file   Social Needs    Financial resource strain: Not on file    Food insecurity     Worry: Not on file     Inability: Not on file    Transportation needs     Medical: Not on file     Non-medical: Not on file   Tobacco Use    Smoking status: Never Smoker    Smokeless tobacco: Never Used   Substance and Sexual Activity    Alcohol use: No    Drug use: No    Sexual activity: Yes     Partners: Male   Lifestyle    Physical activity     Days per week: Not on file     Minutes per session: Not on file    Stress: Not on file   Relationships    Social connections     Talks on phone: Not on file     Gets together: Not on file     Attends Islam service: Not on file     Active member of club or organization: Not on file     Attends meetings of clubs or organizations: Not on file     Relationship status: Not on file    Intimate partner violence     Fear of current or ex partner: Not on file     Emotionally abused: Not on file     Physically abused: Not on file     Forced sexual activity: Not on file   Other Topics Concern    Not on file   Social History Narrative    Not on file     Family History   Problem Relation Age of Onset    High Blood Pressure Mother     Cancer Father        Physical exam Physical Exam  Constitutional:       Appearance: Normal appearance. She is obese. HENT:      Head: Normocephalic. Eyes:      Extraocular Movements: Extraocular movements intact.    Pulmonary:      Effort: Pulmonary effort is normal.   Neurological:      Mental Status: She is alert and oriented to person, place, and time. Psychiatric:         Mood and Affect: Mood normal.         Behavior: Behavior normal.         Thought Content: Thought content normal.         Judgment: Judgment normal.       3/4 hc    Assessment/Plan  1. Complete miscarriage  - Discussed causes of early miscarriages with the pt. All questions answered  - Pt to have a normal period before trying to conceive again  - Pt to avoid restarting her topamax for her migraines while she is trying to conceive.       Pt to follow up TREVOR Noonan MD  [address]

## 2021-03-08 ENCOUNTER — OFFICE VISIT (OUTPATIENT)
Dept: OBGYN CLINIC | Age: 31
End: 2021-03-08
Payer: MEDICARE

## 2021-03-08 VITALS
SYSTOLIC BLOOD PRESSURE: 109 MMHG | DIASTOLIC BLOOD PRESSURE: 76 MMHG | BODY MASS INDEX: 31.83 KG/M2 | HEART RATE: 84 BPM | WEIGHT: 163 LBS

## 2021-03-08 DIAGNOSIS — O03.9 COMPLETE MISCARRIAGE: Primary | ICD-10-CM

## 2021-03-08 PROCEDURE — 99212 OFFICE O/P EST SF 10 MIN: CPT | Performed by: OBSTETRICS & GYNECOLOGY

## 2021-03-08 PROCEDURE — 1036F TOBACCO NON-USER: CPT | Performed by: OBSTETRICS & GYNECOLOGY

## 2021-03-08 PROCEDURE — G8484 FLU IMMUNIZE NO ADMIN: HCPCS | Performed by: OBSTETRICS & GYNECOLOGY

## 2021-03-08 PROCEDURE — G8427 DOCREV CUR MEDS BY ELIG CLIN: HCPCS | Performed by: OBSTETRICS & GYNECOLOGY

## 2021-03-08 PROCEDURE — G8417 CALC BMI ABV UP PARAM F/U: HCPCS | Performed by: OBSTETRICS & GYNECOLOGY

## 2021-03-08 ASSESSMENT — ENCOUNTER SYMPTOMS
ABDOMINAL PAIN: 0
COUGH: 0
SHORTNESS OF BREATH: 0
BACK PAIN: 0

## 2021-09-27 ENCOUNTER — TELEPHONE (OUTPATIENT)
Dept: OBGYN CLINIC | Age: 31
End: 2021-09-27

## 2021-09-27 NOTE — TELEPHONE ENCOUNTER
Pt called and she is having back pain and a lot of white discharge. Her LMP is 8/10 she doesn't know if she'ss preg or not. Should she take an at home test? And the only vag symptom is white discharge. Thanks!

## 2021-09-27 NOTE — TELEPHONE ENCOUNTER
Yes, have her take a pregnancy test.  If negative then she can try using an over the counter monistat.  Thanks

## 2021-12-08 ENCOUNTER — HOSPITAL ENCOUNTER (OUTPATIENT)
Age: 31
Setting detail: SPECIMEN
Discharge: HOME | End: 2021-12-08

## 2021-12-08 ENCOUNTER — OFFICE VISIT (OUTPATIENT)
Dept: OBGYN CLINIC | Age: 31
End: 2021-12-08
Payer: MEDICARE

## 2021-12-08 VITALS
HEIGHT: 60 IN | SYSTOLIC BLOOD PRESSURE: 132 MMHG | WEIGHT: 172 LBS | DIASTOLIC BLOOD PRESSURE: 80 MMHG | BODY MASS INDEX: 33.77 KG/M2

## 2021-12-08 DIAGNOSIS — N89.8 VAGINAL DISCHARGE: Primary | ICD-10-CM

## 2021-12-08 DIAGNOSIS — N89.8 VAGINAL DISCHARGE: ICD-10-CM

## 2021-12-08 DIAGNOSIS — R10.2 PELVIC PAIN: ICD-10-CM

## 2021-12-08 DIAGNOSIS — N92.6 IRREGULAR MENSES: ICD-10-CM

## 2021-12-08 PROCEDURE — 81025 URINE PREGNANCY TEST: CPT

## 2021-12-08 PROCEDURE — G8417 CALC BMI ABV UP PARAM F/U: HCPCS

## 2021-12-08 PROCEDURE — 1036F TOBACCO NON-USER: CPT

## 2021-12-08 PROCEDURE — G8484 FLU IMMUNIZE NO ADMIN: HCPCS

## 2021-12-08 PROCEDURE — G8428 CUR MEDS NOT DOCUMENT: HCPCS

## 2021-12-08 PROCEDURE — 99213 OFFICE O/P EST LOW 20 MIN: CPT

## 2021-12-08 NOTE — Clinical Note
Looks like she is overdue for her pap. Maybe make her 2 month follow up an annual w pap instead? Since we will probably discuss results and follow up as testing is completed.

## 2021-12-08 NOTE — PROGRESS NOTES
lesions. Left Labia: No tenderness or lesions. Vaginal discharge present. HENT:      Head: Normocephalic and atraumatic. Pulmonary:      Effort: Pulmonary effort is normal.   Abdominal:      Tenderness: There is no abdominal tenderness. Musculoskeletal:         General: Normal range of motion. Cervical back: Normal range of motion. Neurological:      General: No focal deficit present. Mental Status: She is alert and oriented to person, place, and time. Mental status is at baseline. Skin:     General: Skin is warm and dry. Findings: Acne present. Psychiatric:         Attention and Perception: Attention and perception normal.         Mood and Affect: Mood and affect normal.         Speech: Speech normal.         Behavior: Behavior normal. Behavior is cooperative. Thought Content: Thought content normal.         Cognition and Memory: Cognition normal.         Judgment: Judgment normal.   Vitals reviewed. Assessment and Plan:  Melania Fung was seen today for irregular menses and vaginal discharge. Diagnoses and all orders for this visit:    Vaginal discharge  -     VAGINITIS DNA PROBE; Future    Pelvic pain  -     Urinalysis Reflex to Culture; Future  -     US PELVIS COMPLETE; Future    Irregular menses  -     POCT urine pregnancy  -     US PELVIS COMPLETE; Future  -     Hemoglobin A1C; Future  -     Prolactin; Future  -     TSH without Reflex; Future  -     Follicle Stimulating Hormone; Future  -     Testosterone; Future  -     Luteinizing Hormone; Future       Recommend OTC tylenol, ibuprofen and use of a heating pad for pelvic pain    See PCP for back pain. Labwork ordered to rule out PCOS. Patient to schedule pelvic ultrasound. Had GDM with last pregnancy, will check A1c. Affirm and UA collected and sent to lab. UPT negative. Will treat pending results. She was also counseled on her preventative health maintenance. Needs pap smear at next appt.  Annual and follow up in 2 months      Electronically signed by FERNANDO Dowell - FIONA

## 2021-12-08 NOTE — Clinical Note
Please let patient know bloodwork is ordered following yesterday's appt for her to complete at her convenience. (We will end up calling her later today with affirm results if you want to wait.  I just don't want to forget)

## 2021-12-09 LAB
-: NORMAL
AMORPHOUS: NORMAL
BACTERIA: NORMAL
BILIRUBIN URINE: NEGATIVE
CASTS UA: NORMAL /LPF (ref 0–8)
COLOR: YELLOW
COMMENT UA: ABNORMAL
CRYSTALS, UA: NORMAL /HPF
DIRECT EXAM: ABNORMAL
EPITHELIAL CELLS UA: NORMAL /HPF (ref 0–5)
GLUCOSE URINE: NEGATIVE
KETONES, URINE: NEGATIVE
LEUKOCYTE ESTERASE, URINE: ABNORMAL
Lab: ABNORMAL
MUCUS: NORMAL
NITRITE, URINE: NEGATIVE
OTHER OBSERVATIONS UA: NORMAL
PH UA: 7.5 (ref 5–8)
PROTEIN UA: NEGATIVE
RBC UA: NORMAL /HPF (ref 0–4)
RENAL EPITHELIAL, UA: NORMAL /HPF
SPECIFIC GRAVITY UA: 1.01 (ref 1–1.03)
SPECIMEN DESCRIPTION: ABNORMAL
TRICHOMONAS: NORMAL
TURBIDITY: CLEAR
URINE HGB: NEGATIVE
UROBILINOGEN, URINE: NORMAL
WBC UA: NORMAL /HPF (ref 0–5)
YEAST: NORMAL

## 2021-12-09 RX ORDER — METRONIDAZOLE 500 MG/1
500 TABLET ORAL 2 TIMES DAILY
Qty: 14 TABLET | Refills: 0 | Status: SHIPPED | OUTPATIENT
Start: 2021-12-09 | End: 2021-12-16

## 2021-12-10 ENCOUNTER — TELEPHONE (OUTPATIENT)
Dept: OBGYN CLINIC | Age: 31
End: 2021-12-10

## 2021-12-10 NOTE — TELEPHONE ENCOUNTER
LM to call, +bv and rx has been sent. Also, Silvana ordered some blood work for her to have done. Orders in [address]    She can schedule a 2 weeks f/u for annual and results

## 2022-02-18 ENCOUNTER — OFFICE VISIT (OUTPATIENT)
Dept: OBGYN CLINIC | Age: 32
End: 2022-02-18
Payer: MEDICARE

## 2022-02-18 ENCOUNTER — HOSPITAL ENCOUNTER (OUTPATIENT)
Age: 32
Setting detail: SPECIMEN
Discharge: HOME | End: 2022-02-18

## 2022-02-18 VITALS
WEIGHT: 179.38 LBS | HEART RATE: 83 BPM | DIASTOLIC BLOOD PRESSURE: 81 MMHG | SYSTOLIC BLOOD PRESSURE: 127 MMHG | BODY MASS INDEX: 35.03 KG/M2

## 2022-02-18 DIAGNOSIS — N93.9 ABNORMAL UTERINE BLEEDING (AUB): Primary | ICD-10-CM

## 2022-02-18 DIAGNOSIS — N89.8 VAGINAL DISCHARGE: ICD-10-CM

## 2022-02-18 DIAGNOSIS — N93.9 ABNORMAL UTERINE BLEEDING (AUB): ICD-10-CM

## 2022-02-18 LAB
CHOLESTEROL/HDL RATIO: 4.4
CHOLESTEROL: 209 MG/DL
ESTRADIOL LEVEL: 56 PG/ML (ref 27–314)
FOLLICLE STIMULATING HORMONE: 4 U/L (ref 1.7–21.5)
HCG QUANTITATIVE: <1 IU/L
HDLC SERPL-MCNC: 47 MG/DL
LDL CHOLESTEROL: 145 MG/DL (ref 0–130)
LH: 13.9 U/L (ref 1–95.6)
PROLACTIN: 11.82 UG/L (ref 4.79–23.3)
SEX HORMONE BINDING GLOBULIN: 36 NMOL/L (ref 30–135)
TESTOSTERONE FREE-NONMALE: 7.3 PG/ML (ref 1.3–9.2)
TESTOSTERONE TOTAL: 43 NG/DL (ref 20–70)
TRIGL SERPL-MCNC: 84 MG/DL
VLDLC SERPL CALC-MCNC: ABNORMAL MG/DL (ref 1–30)

## 2022-02-18 PROCEDURE — G8417 CALC BMI ABV UP PARAM F/U: HCPCS | Performed by: OBSTETRICS & GYNECOLOGY

## 2022-02-18 PROCEDURE — 99213 OFFICE O/P EST LOW 20 MIN: CPT | Performed by: OBSTETRICS & GYNECOLOGY

## 2022-02-18 PROCEDURE — G8484 FLU IMMUNIZE NO ADMIN: HCPCS | Performed by: OBSTETRICS & GYNECOLOGY

## 2022-02-18 PROCEDURE — 1036F TOBACCO NON-USER: CPT | Performed by: OBSTETRICS & GYNECOLOGY

## 2022-02-18 PROCEDURE — G8427 DOCREV CUR MEDS BY ELIG CLIN: HCPCS | Performed by: OBSTETRICS & GYNECOLOGY

## 2022-02-18 RX ORDER — MEDROXYPROGESTERONE ACETATE 10 MG/1
10 TABLET ORAL DAILY
Qty: 10 TABLET | Refills: 0 | Status: SHIPPED | OUTPATIENT
Start: 2022-02-18 | End: 2022-04-15 | Stop reason: SDUPTHER

## 2022-02-18 RX ORDER — METRONIDAZOLE 500 MG/1
500 TABLET ORAL 2 TIMES DAILY
Qty: 14 TABLET | Refills: 0 | Status: SHIPPED | OUTPATIENT
Start: 2022-02-18 | End: 2022-02-25

## 2022-02-18 ASSESSMENT — ENCOUNTER SYMPTOMS
ABDOMINAL PAIN: 0
COUGH: 0
SHORTNESS OF BREATH: 0
BACK PAIN: 0

## 2022-02-18 NOTE — PROGRESS NOTES
[facility] - [address] [facility] [address]  Dept: [phone]  Dept Fax: [phone]    22    Chief Complaint   Patient presents with    Menstrual Problem     pt states she hasn't had a period since October  she took an at home test and it was negative        Radha Solomon 33 y.o. has a complaint of irregular periods. She has not had not had a period since October. She took a pregnancy test 2 months ago and it was negative. She did have this happen 5 years ago, but she only missed 2 months. Her period restarted on it's own at that time without any intervention. She says that her breast can be very tender and painful occasionally. She says it has worsened since her periods stopped. She denies any nipple discharge. She denies any HAs or changes in her vision. She is having increased vaginal discharge and some dysuria as well. She did have BV 2 months ago. She has gained weight recently as well. Interpretor #[id number]    Review of Systems   Constitutional: Negative for chills and fever. HENT: Negative for congestion. Respiratory: Negative for cough and shortness of breath. Cardiovascular: Negative for chest pain and palpitations. Gastrointestinal: Negative for abdominal pain. Genitourinary: Negative for dyspareunia, pelvic pain and vaginal discharge. Musculoskeletal: Negative for back pain. Neurological: Negative for dizziness, light-headedness and headaches. Psychiatric/Behavioral: The patient is not nervous/anxious. Gynecologic History  No LMP recorded. (Menstrual status: Irregular periods). Contraception: none  Last Pap: 17  Results: normal  Last Mammogram: n/a    Obstetric History  : 6  Para: 4  AB: 2    History reviewed. No pertinent past medical history.   Past Surgical History:   Procedure Laterality Date     SECTION      X3     SECTION N/A 2017     [facility]    TONSILLECTOMY       Allergies   Allergen Reactions    Adhesive Tape      Current Outpatient Medications   Medication Sig Dispense Refill    medroxyPROGESTERone (PROVERA) 10 MG tablet Take 1 tablet by mouth daily 10 tablet 0    metroNIDAZOLE (FLAGYL) 500 MG tablet Take 1 tablet by mouth 2 times daily for 7 days 14 tablet 0    Cholecalciferol (VITAMIN D3) 1000 UNITS TABS       metFORMIN (GLUCOPHAGE-XR) 500 MG extended release tablet 1 tablet with evening meal      Prenatal Vit-Fe Fumarate-FA (VOL-NIA) 28-1 MG TABS       acetaminophen (APAP EXTRA STRENGTH) 500 MG tablet Take 2 tablets by mouth every 6 hours as needed for Pain 60 tablet 1     No current facility-administered medications for this visit. Social History     Socioeconomic History    Marital status:      Spouse name: Not on file    Number of children: Not on file    Years of education: Not on file    Highest education level: Not on file   Occupational History    Not on file   Tobacco Use    Smoking status: Never Smoker    Smokeless tobacco: Never Used   Vaping Use    Vaping Use: Never used   Substance and Sexual Activity    Alcohol use: No    Drug use: No    Sexual activity: Yes     Partners: Male   Other Topics Concern    Not on file   Social History Narrative    Not on file     Social Determinants of Health     Financial Resource Strain:     Difficulty of Paying Living Expenses: Not on file   Food Insecurity:     Worried About Running Out of Food in the Last Year: Not on file    Donte of Food in the Last Year: Not on file   Transportation Needs:     Lack of Transportation (Medical): Not on file    Lack of Transportation (Non-Medical):  Not on file   Physical Activity:     Days of Exercise per Week: Not on file    Minutes of Exercise per Session: Not on file   Stress:     Feeling of Stress : Not on file   Social Connections:     Frequency of Communication with Friends and Family: Not on file    Frequency of Social Gatherings with Friends and Family: Not on file    Attends Orthodox Services: Not on file    Active Member of Clubs or Organizations: Not on file    Attends Club or Organization Meetings: Not on file    Marital Status: Not on file   Intimate Partner Violence:     Fear of Current or Ex-Partner: Not on file    Emotionally Abused: Not on file    Physically Abused: Not on file    Sexually Abused: Not on file   Housing Stability:     Unable to Pay for Housing in the Last Year: Not on file    Number of [place] in the Last Year: Not on file    Unstable Housing in the Last Year: Not on file     Family History   Problem Relation Age of Onset    High Blood Pressure Mother     Cancer Father        Physical exam Physical Exam  Constitutional:       Appearance: Normal appearance. She is normal weight. HENT:      Head: Normocephalic. Eyes:      Extraocular Movements: Extraocular movements intact. Pulmonary:      Effort: Pulmonary effort is normal.   Neurological:      Mental Status: She is alert and oriented to person, place, and time. Psychiatric:         Mood and Affect: Mood normal.         Behavior: Behavior normal.         Thought Content: Thought content normal.         Judgment: Judgment normal.          Assessment/Plan  1. Abnormal uterine bleeding (AUB)  - Will obtain labs and ultrasound   - HCG, Quantitative, Pregnancy; Future   - US PELVIS COMPLETE; Future   - US NON OB TRANSVAGINAL; Future   - Testosterone, Free; Future   - 17-Hydroxyprogesterone; Future   - Prolactin; Future   - Lipid Panel; Future   - Luteinizing Hormone; Future   - Follicle Stimulating Hormone; Future   - Estradiol; Future  - Provera  rx given for 10 mg daily x 10 days    2.  Vaginal discharge  - Rx for flagyl sent in    Pt to follow up in 4-6 wks for results     Marisela Brennan MD  [address]

## 2022-02-21 ENCOUNTER — CLINICAL DOCUMENTATION (OUTPATIENT)
Dept: OBGYN CLINIC | Age: 32
End: 2022-02-21

## 2022-02-21 DIAGNOSIS — E28.2 PCOS (POLYCYSTIC OVARIAN SYNDROME): ICD-10-CM

## 2022-02-21 LAB — 17 OH PROGESTERONE: 117.05 NG/DL

## 2022-04-15 ENCOUNTER — OFFICE VISIT (OUTPATIENT)
Dept: OBGYN CLINIC | Age: 32
End: 2022-04-15
Payer: MEDICARE

## 2022-04-15 VITALS
WEIGHT: 173.8 LBS | SYSTOLIC BLOOD PRESSURE: 127 MMHG | HEART RATE: 91 BPM | HEIGHT: 60 IN | BODY MASS INDEX: 34.12 KG/M2 | DIASTOLIC BLOOD PRESSURE: 94 MMHG

## 2022-04-15 DIAGNOSIS — E28.2 PCOS (POLYCYSTIC OVARIAN SYNDROME): Primary | ICD-10-CM

## 2022-04-15 PROCEDURE — G8427 DOCREV CUR MEDS BY ELIG CLIN: HCPCS | Performed by: OBSTETRICS & GYNECOLOGY

## 2022-04-15 PROCEDURE — 99213 OFFICE O/P EST LOW 20 MIN: CPT | Performed by: OBSTETRICS & GYNECOLOGY

## 2022-04-15 PROCEDURE — 1036F TOBACCO NON-USER: CPT | Performed by: OBSTETRICS & GYNECOLOGY

## 2022-04-15 PROCEDURE — G8417 CALC BMI ABV UP PARAM F/U: HCPCS | Performed by: OBSTETRICS & GYNECOLOGY

## 2022-04-15 RX ORDER — MEDROXYPROGESTERONE ACETATE 10 MG/1
10 TABLET ORAL DAILY
Qty: 10 TABLET | Refills: 12 | Status: SHIPPED | OUTPATIENT
Start: 2022-04-15 | End: 2022-04-15

## 2022-04-15 RX ORDER — LETROZOLE 2.5 MG/1
2.5 TABLET, FILM COATED ORAL DAILY
Qty: 5 TABLET | Refills: 1 | Status: SHIPPED | OUTPATIENT
Start: 2022-04-15 | End: 2022-06-03

## 2022-04-15 RX ORDER — MEDROXYPROGESTERONE ACETATE 10 MG/1
10 TABLET ORAL DAILY
Qty: 10 TABLET | Refills: 12 | Status: SHIPPED | OUTPATIENT
Start: 2022-04-15 | End: 2022-08-30 | Stop reason: ALTCHOICE

## 2022-04-15 ASSESSMENT — ENCOUNTER SYMPTOMS
ABDOMINAL PAIN: 0
SHORTNESS OF BREATH: 0
COUGH: 0
BACK PAIN: 0

## 2022-04-15 NOTE — PROGRESS NOTES
[address] [facility] Cheryal Schaumann  [address] [place] [address] [address] [facility] [address]  Dept: [phone]  Dept Fax: [phone]    04/15/22    Chief Complaint   Patient presents with    Irregular Menses     #[id number] started on provera 22       Radha Solomon 27 y.o. is here to discuss her diagnosis of PCOS. She did lab work that showed she has developed PCOS. She took the provera last month and she had a period. She took it again this month and started her period 22. She doesn't want to be on birth control. She and her  are thinking they want to try to conceive now. Review of Systems   Constitutional: Negative for chills and fatigue. HENT: Negative for congestion. Respiratory: Negative for cough and shortness of breath. Cardiovascular: Negative for chest pain and palpitations. Gastrointestinal: Negative for abdominal pain. Genitourinary: Positive for menstrual problem. Negative for dyspareunia, pelvic pain and vaginal discharge. Musculoskeletal: Negative for back pain. Neurological: Negative for dizziness and light-headedness. Psychiatric/Behavioral: The patient is not nervous/anxious. Gynecologic History  Patient's last menstrual period was 2022. Contraception: none  Last Pap: 17  Results: normal  Last Mammogram: n/a    Obstetric History  : 6  Para: 4  AB: 2    No past medical history on file.   Past Surgical History:   Procedure Laterality Date     SECTION      X3     SECTION N/A 2017     SECTION performed by Murleen Koyanagi, MD at [facility] L[facility]D OR    TONSILLECTOMY       Allergies   Allergen Reactions    Adhesive Tape      Current Outpatient Medications   Medication Sig Dispense Refill    letrozole (FEMARA) 2.5 MG tablet Take 1 tablet by mouth daily for 5 days 5 tablet 1    medroxyPROGESTERone (PROVERA) 10 MG tablet Take 1 tablet by mouth daily Start the  day of every month 10 tablet 12    metFORMIN (GLUCOPHAGE-XR) 500 MG extended release tablet 1 tablet with evening meal      Cholecalciferol (VITAMIN D3) 1000 UNITS TABS       Prenatal Vit-Fe Fumarate-FA (VOL-NIA) 28-1 MG TABS       acetaminophen (APAP EXTRA STRENGTH) 500 MG tablet Take 2 tablets by mouth every 6 hours as needed for Pain 60 tablet 1     No current facility-administered medications for this visit. Social History     Socioeconomic History    Marital status:      Spouse name: Not on file    Number of children: Not on file    Years of education: Not on file    Highest education level: Not on file   Occupational History    Not on file   Tobacco Use    Smoking status: Never Smoker    Smokeless tobacco: Never Used   Vaping Use    Vaping Use: Never used   Substance and Sexual Activity    Alcohol use: No    Drug use: No    Sexual activity: Yes     Partners: Male   Other Topics Concern    Not on file   Social History Narrative    Not on file     Social Determinants of Health     Financial Resource Strain:     Difficulty of Paying Living Expenses: Not on file   Food Insecurity:     Worried About Running Out of Food in the Last Year: Not on file    Donte of Food in the Last Year: Not on file   Transportation Needs:     Lack of Transportation (Medical): Not on file    Lack of Transportation (Non-Medical):  Not on file   Physical Activity:     Days of Exercise per Week: Not on file    Minutes of Exercise per Session: Not on file   Stress:     Feeling of Stress : Not on file   Social Connections:     Frequency of Communication with Friends and Family: Not on file    Frequency of Social Gatherings with Friends and Family: Not on file    Attends Hoahaoism Services: Not on file    Active Member of Clubs or Organizations: Not on file    Attends Club or Organization Meetings: Not on file    Marital Status: Not on file   Intimate Partner Violence:     Fear of Current or Ex-Partner: Not on file    Emotionally Abused: Not on file    Physically Abused: Not on file    Sexually Abused: Not on file   Housing Stability:     Unable to Pay for Housing in the Last Year: Not on file    Number of Places Lived in the Last Year: Not on file    Unstable Housing in the Last Year: Not on file     Family History   Problem Relation Age of Onset    High Blood Pressure Mother     Cancer Father        Physical exam Physical Exam  Constitutional:       Appearance: Normal appearance. She is normal weight. HENT:      Head: Normocephalic. Eyes:      Extraocular Movements: Extraocular movements intact. Pulmonary:      Effort: Pulmonary effort is normal.   Neurological:      Mental Status: She is alert and oriented to person, place, and time. Psychiatric:         Mood and Affect: Mood normal.         Behavior: Behavior normal.         Thought Content: Thought content normal.         Judgment: Judgment normal.         Assessment/Plan  1. PCOS (polycystic ovarian syndrome)  - Discussed diagnosis of PCOS based on oligo/amenorrhea, ultrasound findings and hyperandrogenism. Reviewed implications of the disease including increased risk for insulin resistance/DM, lipid abnormalities and cardiovascular disease, as well as GYN specific effects (possible infertility, oligomenorrhea, increased risk of endometrial cancer). Encouraged weight loss with diet and exercise. Reviewed medical management options for endometrial protection, including side effect profiles and dosing regimens. All questions answered. Patient desires to proceed with cyclic provera at this time. - reviewed that while letrozole is not FDA approved for ovulation induction, per ACOG it should be recommended as first line therapy for ovulation induction due to increased live birth rates when compared with clomid in patients with PCOS  - discussed management of ovulation dysfunction with letrozole 2.5mg po daily x5 days (days 3-7). Reviewed that Day 1 is the first day of vaginal bleeding.     Pt to follow up PRN  Brenda Cartagena MD  [address]

## 2022-06-03 RX ORDER — LETROZOLE 2.5 MG/1
5 TABLET, FILM COATED ORAL DAILY
Qty: 10 TABLET | Refills: 1 | Status: SHIPPED | OUTPATIENT
Start: 2022-06-03 | End: 2022-08-30 | Stop reason: ALTCHOICE

## 2022-06-20 RX ORDER — LETROZOLE 2.5 MG/1
5 TABLET, FILM COATED ORAL DAILY
Qty: 10 TABLET | Refills: 1 | Status: SHIPPED | OUTPATIENT
Start: 2022-06-20 | End: 2022-08-30 | Stop reason: ALTCHOICE

## 2022-06-20 RX ORDER — LETROZOLE 2.5 MG/1
TABLET, FILM COATED ORAL
Qty: 10 TABLET | Refills: 2 | OUTPATIENT
Start: 2022-06-20

## 2022-08-09 RX ORDER — LETROZOLE 2.5 MG/1
TABLET, FILM COATED ORAL
Qty: 10 TABLET | Refills: 1 | Status: SHIPPED | OUTPATIENT
Start: 2022-08-09 | End: 2022-08-30 | Stop reason: ALTCHOICE

## 2022-08-30 ENCOUNTER — HOSPITAL ENCOUNTER (OUTPATIENT)
Age: 32
Setting detail: SPECIMEN
Discharge: HOME | End: 2022-08-30

## 2022-08-30 ENCOUNTER — OFFICE VISIT (OUTPATIENT)
Dept: OBGYN CLINIC | Age: 32
End: 2022-08-30
Payer: MEDICARE

## 2022-08-30 VITALS
BODY MASS INDEX: 32.39 KG/M2 | DIASTOLIC BLOOD PRESSURE: 84 MMHG | HEIGHT: 60 IN | WEIGHT: 165 LBS | HEART RATE: 94 BPM | SYSTOLIC BLOOD PRESSURE: 117 MMHG

## 2022-08-30 DIAGNOSIS — Z79.811 USE OF LETROZOLE (FEMARA): ICD-10-CM

## 2022-08-30 DIAGNOSIS — N92.6 MISSED MENSES: ICD-10-CM

## 2022-08-30 DIAGNOSIS — N91.2 AMENORRHEA: Primary | ICD-10-CM

## 2022-08-30 DIAGNOSIS — O09.521 MULTIGRAVIDA OF ADVANCED MATERNAL AGE IN FIRST TRIMESTER: ICD-10-CM

## 2022-08-30 DIAGNOSIS — N91.2 AMENORRHEA: ICD-10-CM

## 2022-08-30 DIAGNOSIS — O24.119 PRE-EXISTING TYPE 2 DIABETES MELLITUS DURING PREGNANCY, ANTEPARTUM: ICD-10-CM

## 2022-08-30 PROBLEM — R10.9 LEFT FLANK PAIN: Status: RESOLVED | Noted: 2017-08-23 | Resolved: 2022-08-30

## 2022-08-30 PROBLEM — R50.9 FEBRILE: Status: RESOLVED | Noted: 2017-08-23 | Resolved: 2022-08-30

## 2022-08-30 LAB
ABO/RH: NORMAL
ABSOLUTE EOS #: 0.07 K/UL (ref 0–0.44)
ABSOLUTE IMMATURE GRANULOCYTE: 0.06 K/UL (ref 0–0.3)
ABSOLUTE LYMPH #: 2.19 K/UL (ref 1.1–3.7)
ABSOLUTE MONO #: 0.52 K/UL (ref 0.1–1.2)
ANTIBODY SCREEN: NEGATIVE
BASOPHILS # BLD: 0 % (ref 0–2)
BASOPHILS ABSOLUTE: <0.03 K/UL (ref 0–0.2)
EOSINOPHILS RELATIVE PERCENT: 1 % (ref 1–4)
ESTIMATED AVERAGE GLUCOSE: 114 MG/DL
HBA1C MFR BLD: 5.6 % (ref 4–6)
HCT VFR BLD CALC: 34.1 % (ref 36.3–47.1)
HEMOGLOBIN: 10.4 G/DL (ref 11.9–15.1)
HEPATITIS B SURFACE ANTIGEN: NONREACTIVE
HEPATITIS C ANTIBODY: NONREACTIVE
HIV AG/AB: NONREACTIVE
IMMATURE GRANULOCYTES: 1 %
LYMPHOCYTES # BLD: 23 % (ref 24–43)
MCH RBC QN AUTO: 25.9 PG (ref 25.2–33.5)
MCHC RBC AUTO-ENTMCNC: 30.5 G/DL (ref 28.4–34.8)
MCV RBC AUTO: 84.8 FL (ref 82.6–102.9)
MONOCYTES # BLD: 5 % (ref 3–12)
NRBC AUTOMATED: 0 PER 100 WBC
PDW BLD-RTO: 14.1 % (ref 11.8–14.4)
PLATELET # BLD: 294 K/UL (ref 138–453)
PMV BLD AUTO: 10.2 FL (ref 8.1–13.5)
RBC # BLD: 4.02 M/UL (ref 3.95–5.11)
RUBV IGG SER QL: 109.7 IU/ML
SEG NEUTROPHILS: 70 % (ref 36–65)
SEGMENTED NEUTROPHILS ABSOLUTE COUNT: 6.8 K/UL (ref 1.5–8.1)
T. PALLIDUM, IGG: NONREACTIVE
TSH SERPL DL<=0.05 MIU/L-ACNC: 0.56 UIU/ML (ref 0.3–5)
WBC # BLD: 9.7 K/UL (ref 3.5–11.3)

## 2022-08-30 PROCEDURE — 99213 OFFICE O/P EST LOW 20 MIN: CPT | Performed by: OBSTETRICS & GYNECOLOGY

## 2022-08-30 NOTE — PROGRESS NOTES
[address] [facility] - [address] [place] [address] [address] [facility]  Dept: [phone]  22    Chief Complaint   Patient presents with    Amenorrhea     LMP 22 11w3d by Todd Francois is a  who presents for initial confirmation of pregnancy. This is a planned pregnancy for her and her . She is having some cramping in the right lower pelvis. She denies any history of CHTN, asthma. She has type 2 DM. She has not had chicken pox, or the Varicella vaccine in the past.      Planned/unplanned:  Planned  JERED:  Estimated Date of Delivery: None noted. 51K5C  COMPLICATIONS CONCERNS: Type 2 DM, obesity, migraines, PCOS on metformin  PRENATAL HISTORY:  LTCS x4, pyelonephritis (G4)    Blood pressure 117/84, pulse 94, height 5' (1.524 m), weight 165 lb (74.8 kg), last menstrual period 2022, not currently breastfeeding. History reviewed. No pertinent past medical history.   Past Surgical History:   Procedure Laterality Date     SECTION      X3     SECTION N/A 2017     SECTION performed by Pav[facility]ra Chavez [facility] at [facility]    TONSILLECTOMY       Social History     Socioeconomic History    Marital status:      Spouse name: Not on file    Number of children: Not on file    Years of education: Not on file    Highest education level: Not on file   Occupational History    Not on file   Tobacco Use    Smoking status: Never    Smokeless tobacco: Never   Vaping Use    Vaping Use: Never used   Substance and Sexual Activity    Alcohol use: No    Drug use: No    Sexual activity: Yes     Partners: Male   Other Topics Concern    Not on file   Social History Narrative    Not on file     Social Determinants of Health     Financial Resource Strain: Not on file   Food Insecurity: Not on file   Transportation Needs: Not on file   Physical Activity: Not on file   Stress: Not on file   Social Connections: Not on file   Intimate Partner Violence: Not on file   Housing Stability: Not on file     Allergies   Allergen Reactions    Adhesive Tape      Family History   Problem Relation Age of Onset    High Blood Pressure Mother     Cancer Father        ROS:  Constitutional:  Denies fever or chills, fatigue  Eyes:  Denies change in visual acuity, blurred vision, itching  HENT:  Denies nasal congestion or sore throat   Respiratory:  Denies cough or shortness of breath, difficulty breathing  Cardiovascular:  Denies chest pain or edema  GI:  Denies abdominal pain, nausea, vomiting, bloody stools or diarrhea   :  Denies dysuria, frequency, urgency  Musculoskeletal:  Denies back pain or joint pain   Integument:  Denies rash, itching, dryness  Neurologic:  Denies headache, focal weakness or sensory changes   Endocrine:  Denies polyuria or polydipsia,    Lymphatic:  Denies swollen glands,   Psychiatric:  Denies depression or anxiety       Physical findings: HEENT - Perrla, Eomi  Neck- Supple, no bruits  Lungs - Clear to auscultation. CV- Regular rate and rythym,   Abdomen - Non tender, non distended, no masses  Extremities - no weakness, no calf pain, no edema, no posterior tibial pain  Pelvis - no bleeding, no discharge, no CMT      Assessment/Plan:  Patient Active Problem List   Diagnosis    H/O C/S x3    Type 2 DM- Class B    Previous  delivery, antepartum condition or complication    Rh+/RI/GBS unk    RLTCS 15 F APG  wt ? Medication exposure during first trimester of pregnancy    PCOS (polycystic ovarian syndrome)    Multigravida of advanced maternal age in first trimester    Use of letrozole (Femara)        Diagnosis Orders   1. Amenorrhea  Vaginitis DNA Probe    C.trachomatis N.gonorrhoeae DNA    Prenatal Profile 1    HIV Screen    Hepatitis C Antibody    Culture, Urine    Urine Drug Screen    Urinalysis    PAP Smear      2.  Missed menses  Vaginitis DNA Probe    C.trachomatis N.gonorrhoeae DNA    Prenatal Profile 1    HIV Screen    Hepatitis C Antibody    Culture, Urine    Urine Drug Screen    Urinalysis    PAP Smear      3. Multigravida of advanced maternal age in first trimester        4. Type 2 DM- Class B  Hemoglobin A1C    TSH With Reflex Ft4    Protein / Creatinine Ratio, Urine    EKG 12 lead      5. Use of letrozole (Femara)          Type 2 DM - will give glucose logs. Metformin stopped for now  H/o LTCS x4  Desires NIPT  AMA - will be at the time of delivery  Will need early MFM appt for her GDM type 2    Return in about 1 week (around 9/6/2022) for ACKENNY cason/ Carol.     Gomez Marx MD

## 2022-08-31 ENCOUNTER — CLINICAL DOCUMENTATION (OUTPATIENT)
Dept: OBGYN CLINIC | Age: 32
End: 2022-08-31

## 2022-08-31 DIAGNOSIS — O24.119 PRE-EXISTING TYPE 2 DIABETES MELLITUS DURING PREGNANCY, ANTEPARTUM: ICD-10-CM

## 2022-08-31 DIAGNOSIS — Z32.01 POSITIVE PREGNANCY TEST: ICD-10-CM

## 2022-08-31 DIAGNOSIS — O34.219 HISTORY OF CESAREAN DELIVERY, ANTEPARTUM: ICD-10-CM

## 2022-08-31 DIAGNOSIS — O09.521 MULTIGRAVIDA OF ADVANCED MATERNAL AGE IN FIRST TRIMESTER: ICD-10-CM

## 2022-08-31 DIAGNOSIS — Z3A.12 12 WEEKS GESTATION OF PREGNANCY: Primary | ICD-10-CM

## 2022-08-31 DIAGNOSIS — E28.2 PCOS (POLYCYSTIC OVARIAN SYNDROME): ICD-10-CM

## 2022-08-31 LAB
AMPHETAMINE SCREEN URINE: NEGATIVE
BARBITURATE SCREEN URINE: NEGATIVE
BENZODIAZEPINE SCREEN, URINE: NEGATIVE
BILIRUBIN URINE: NEGATIVE
C TRACH DNA GENITAL QL NAA+PROBE: NEGATIVE
CANDIDA SPECIES, DNA PROBE: NEGATIVE
CANNABINOID SCREEN URINE: NEGATIVE
COCAINE METABOLITE, URINE: NEGATIVE
COLOR: YELLOW
COMMENT UA: ABNORMAL
CREATININE URINE: 93.2 MG/DL (ref 28–217)
CULTURE: NORMAL
FENTANYL URINE: NEGATIVE
GARDNERELLA VAGINALIS, DNA PROBE: NEGATIVE
GLUCOSE URINE: NEGATIVE
KETONES, URINE: ABNORMAL
LEUKOCYTE ESTERASE, URINE: NEGATIVE
METHADONE SCREEN, URINE: NEGATIVE
N. GONORRHOEAE DNA: NEGATIVE
NITRITE, URINE: NEGATIVE
OPIATES, URINE: NEGATIVE
OXYCODONE SCREEN URINE: NEGATIVE
PH UA: 7 (ref 5–8)
PHENCYCLIDINE, URINE: NEGATIVE
PROTEIN UA: NEGATIVE
SOURCE: NORMAL
SPECIFIC GRAVITY UA: 1.01 (ref 1–1.03)
SPECIMEN DESCRIPTION: NORMAL
SPECIMEN DESCRIPTION: NORMAL
TEST INFORMATION: NORMAL
TOTAL PROTEIN, URINE: 15 MG/DL
TRICHOMONAS VAGINALIS DNA: NEGATIVE
TURBIDITY: CLEAR
URINE HGB: NEGATIVE
URINE TOTAL PROTEIN CREATININE RATIO: 0.16 (ref 0–0.2)
UROBILINOGEN, URINE: NORMAL

## 2022-08-31 NOTE — PROGRESS NOTES
Confirmation of pregnancy appt with  and ordered referral to [address] [facility] for consult Type-2 diabetic pregnant.

## 2022-09-01 LAB
HPV SAMPLE: NORMAL
HPV, GENOTYPE 16: NOT DETECTED
HPV, GENOTYPE 18: NOT DETECTED
HPV, HIGH RISK OTHER: NOT DETECTED
HPV, INTERPRETATION: NORMAL
SPECIMEN DESCRIPTION: NORMAL

## 2022-09-12 ENCOUNTER — ROUTINE PRENATAL (OUTPATIENT)
Dept: PERINATAL CARE | Age: 32
End: 2022-09-12
Payer: MEDICARE

## 2022-09-12 ENCOUNTER — HOSPITAL ENCOUNTER (OUTPATIENT)
Age: 32
Discharge: HOME | End: 2022-09-12
Payer: MEDICARE

## 2022-09-12 VITALS
SYSTOLIC BLOOD PRESSURE: 118 MMHG | TEMPERATURE: 97.5 F | WEIGHT: 157 LBS | HEIGHT: 60 IN | BODY MASS INDEX: 30.82 KG/M2 | DIASTOLIC BLOOD PRESSURE: 74 MMHG | RESPIRATION RATE: 16 BRPM | HEART RATE: 90 BPM

## 2022-09-12 DIAGNOSIS — Z36.9 FIRST TRIMESTER SCREENING: Primary | ICD-10-CM

## 2022-09-12 DIAGNOSIS — O09.521 MULTIGRAVIDA OF ADVANCED MATERNAL AGE IN FIRST TRIMESTER: ICD-10-CM

## 2022-09-12 DIAGNOSIS — Z3A.13 13 WEEKS GESTATION OF PREGNANCY: ICD-10-CM

## 2022-09-12 DIAGNOSIS — O24.119 PRE-EXISTING TYPE 2 DIABETES MELLITUS DURING PREGNANCY, ANTEPARTUM: ICD-10-CM

## 2022-09-12 DIAGNOSIS — O34.219 PREVIOUS CESAREAN DELIVERY, ANTEPARTUM CONDITION OR COMPLICATION: ICD-10-CM

## 2022-09-12 DIAGNOSIS — O99.211 OBESITY AFFECTING PREGNANCY IN FIRST TRIMESTER: ICD-10-CM

## 2022-09-12 LAB
CRL: NORMAL
CYTOLOGY REPORT: NORMAL
SAC DIAMETER: NORMAL

## 2022-09-12 PROCEDURE — 36415 COLL VENOUS BLD VENIPUNCTURE: CPT

## 2022-09-12 PROCEDURE — 76801 OB US < 14 WKS SINGLE FETUS: CPT | Performed by: OBSTETRICS & GYNECOLOGY

## 2022-09-12 PROCEDURE — 99999 PR OFFICE/OUTPT VISIT,PROCEDURE ONLY: CPT | Performed by: OBSTETRICS & GYNECOLOGY

## 2022-09-12 PROCEDURE — 76813 OB US NUCHAL MEAS 1 GEST: CPT | Performed by: OBSTETRICS & GYNECOLOGY

## 2022-09-12 PROCEDURE — 81508 FTL CGEN ABNOR TWO PROTEINS: CPT

## 2022-09-12 RX ORDER — LANCETS 33 GAUGE
EACH MISCELLANEOUS
COMMUNITY
Start: 2022-08-31

## 2022-09-12 RX ORDER — LANOLIN ALCOHOL/MO/W.PET/CERES
CREAM (GRAM) TOPICAL
COMMUNITY
Start: 2022-08-31

## 2022-09-12 RX ORDER — ISOPRPOPYL ALCOHOL 70 ML/100ML
SWAB TOPICAL
COMMUNITY
Start: 2022-08-31

## 2022-09-12 RX ORDER — POLYETHYLENE GLYCOL 3350 17 G/17G
POWDER, FOR SOLUTION ORAL
COMMUNITY
Start: 2022-08-22

## 2022-09-12 RX ORDER — BLOOD SUGAR DIAGNOSTIC
STRIP MISCELLANEOUS
COMMUNITY
Start: 2022-08-31

## 2022-09-12 RX ORDER — LORAZEPAM 2 MG
TABLET ORAL
COMMUNITY
Start: 2022-08-22

## 2022-09-13 ENCOUNTER — INITIAL PRENATAL (OUTPATIENT)
Dept: OBGYN CLINIC | Age: 32
End: 2022-09-13
Payer: MEDICARE

## 2022-09-13 VITALS
WEIGHT: 158.2 LBS | DIASTOLIC BLOOD PRESSURE: 72 MMHG | BODY MASS INDEX: 29.87 KG/M2 | SYSTOLIC BLOOD PRESSURE: 110 MMHG | HEIGHT: 61 IN

## 2022-09-13 DIAGNOSIS — O34.219 PREGNANCY WITH HISTORY OF CESAREAN SECTION, ANTEPARTUM: ICD-10-CM

## 2022-09-13 DIAGNOSIS — Z3A.13 13 WEEKS GESTATION OF PREGNANCY: Primary | ICD-10-CM

## 2022-09-13 DIAGNOSIS — O21.9 NAUSEA AND VOMITING DURING PREGNANCY: ICD-10-CM

## 2022-09-13 PROCEDURE — G8417 CALC BMI ABV UP PARAM F/U: HCPCS | Performed by: OBSTETRICS & GYNECOLOGY

## 2022-09-13 PROCEDURE — G8428 CUR MEDS NOT DOCUMENT: HCPCS | Performed by: OBSTETRICS & GYNECOLOGY

## 2022-09-13 PROCEDURE — 99211 OFF/OP EST MAY X REQ PHY/QHP: CPT | Performed by: OBSTETRICS & GYNECOLOGY

## 2022-09-13 NOTE — PROGRESS NOTES
Relationship with FOB: , living together, all pregnancy with pt and no other children  Partner's name: Ketan Matute to [facility]  Pain Score:0/10   Job title:Full time mother  This is a planned pregnancy:Yes  Certain LMP:Yes  S/S of pregnancy:Yes, missed period and pain bilateral breasts  Hx N/V pregnancy: morning has N/V. Mother's ethnicity: [place]   Father's ethnicity: [place]       Patient Active Problem List   Diagnosis    H/O C/S x3    Type 2 DM- Class B    Previous  delivery, antepartum condition or complication    Rh+/RI/GBS unk    RLTCS 17 F APG  wt ? Medication exposure during first trimester of pregnancy    PCOS (polycystic ovarian syndrome)    Multigravida of advanced maternal age in first trimester    Use of letrozole (Femara)     Last menstrual period 2022, not currently breastfeeding. lAcon Mcdonnell is a 27 y.o. [id number], here for her ACOG. The patients past medical, surgical, social and family history were reviewed. Current medications and allergies were reviewed, and documented in the chart. Menstrual history: irregular  Birth control: Femara to induce cycles. Hx IUD for year. Pt  would like her to have one more pregnancy.      Wt Readings from Last 3 Encounters:   22 157 lb (71.2 kg)   22 165 lb (74.8 kg)   04/15/22 173 lb 12.8 oz (78.8 kg)     Recent Results (from the past 8736 hour(s))   Urinalysis Reflex to Culture    Collection Time: 21 11:25 PM    Specimen: Urine, clean catch   Result Value Ref Range    Color, UA Yellow Yellow    Turbidity UA Clear Clear    Glucose, Ur NEGATIVE NEGATIVE    Bilirubin Urine NEGATIVE NEGATIVE    Ketones, Urine NEGATIVE NEGATIVE    Specific Gravity, UA 1.006 1.005 - 1.030    Urine Hgb NEGATIVE NEGATIVE    pH, UA 7.5 5.0 - 8.0    Protein, UA NEGATIVE NEGATIVE    Urobilinogen, Urine Normal Normal    Nitrite, Urine NEGATIVE NEGATIVE    Leukocyte Esterase, Urine SMALL (A) NEGATIVE    Urinalysis Comments NOT REPORTED    VAGINITIS DNA PROBE    Collection Time: 12/08/21 11:25 PM    Specimen: Vaginal   Result Value Ref Range    Specimen Description . VAGINA     Special Requests NOT REPORTED     Direct Exam POSITIVE for Gardnerella vaginalis. (A)     Direct Exam NEGATIVE for Candida sp. Direct Exam NEGATIVE for Trichomonas vaginalis     Direct Exam       Method of testing is a DNA probe intended for detection and identification of Candida species, Gardnerella vaginalis, and Trichomonas vaginalis nucleic acid in vaginal fluid specimens from patients with symptoms of vaginitis/vaginosis. Microscopic Urinalysis    Collection Time: 12/08/21 11:25 PM   Result Value Ref Range    -          WBC, UA 0 TO 2 0 - 5 /HPF    RBC, UA 0 TO 2 0 - 4 /HPF    Casts UA NOT REPORTED 0 - 8 /LPF    Crystals, UA NOT REPORTED None /HPF    Epithelial Cells UA 0 TO 2 0 - 5 /HPF    Renal Epithelial, UA NOT REPORTED 0 /HPF    Bacteria, UA NOT REPORTED None    Mucus, UA NOT REPORTED None    Trichomonas, UA NOT REPORTED None    Amorphous, UA NOT REPORTED None    Other Observations UA NOT REPORTED NOT REQ.     Yeast, UA NOT REPORTED None   HCG, Quantitative, Pregnancy    Collection Time: 02/18/22 10:25 AM   Result Value Ref Range    hCG Quant <1 <5 IU/L   Testosterone, Free    Collection Time: 02/18/22 10:25 AM   Result Value Ref Range    Testosterone 43 20 - 70 ng/dL    Sex Hormone Binding 36 30 - 135 nmol/L    Testosterone, Free 7.3 1.3 - 9.2 pg/mL   17-Hydroxyprogesterone    Collection Time: 02/18/22 10:25 AM   Result Value Ref Range    17 OH Progesterone 117.05 <=206.00 ng/dL   Prolactin    Collection Time: 02/18/22 10:25 AM   Result Value Ref Range    Prolactin 11.82 4.79 - 23.30 ug/L   Lipid Panel    Collection Time: 02/18/22 10:25 AM   Result Value Ref Range    Cholesterol 209 (H) <200 mg/dL    HDL 47 >40 mg/dL    LDL Cholesterol 145 (H) 0 - 130 mg/dL    Chol/HDL Ratio 4.4 <5    Triglycerides 84 <150 mg/dL    VLDL NOT REPORTED 1 - 30 mg/dL   Luteinizing Hormone    Collection Time: 02/18/22 10:25 AM   Result Value Ref Range    LH 13.9 1.0 - 05.5 U/L   Follicle Stimulating Hormone    Collection Time: 02/18/22 10:25 AM   Result Value Ref Range    FSH 4.0 1.7 - 21.5 U/L   Estradiol    Collection Time: 02/18/22 10:25 AM   Result Value Ref Range    Estradiol 56 27 - 314 pg/mL   GYN Cytology    Collection Time: 08/30/22  8:48 AM   Result Value Ref Range    Cytology Report       INTERPRETATION    Cervical material, (ThinPrep vial, Imaging-assisted review):  Specimen Adequacy:       Satisfactory for evaluation.       - Endocervical/transformation zone component present. Descriptive Diagnosis:       Negative for intraepithelial lesion or malignancy. Cytotechnologist:   Sun MULLEN(ASCP)  **Electronically Signed Out**  ey/9/12/2022        Procedure/Addendum  HPV Procedure Report       Date Ordered:     8/31/2022     Status: Signed Out       Date Complete:     9/1/2022     By: System Interface       Date Reported:     9/1/2022              Sample:  HPV Type 16      Result:   Not Detected      Ref Range:  (Not Detected)  Sample:  HPV Type 18      Result:   Not Detected      Ref Range: (Not  Detected)  Sample: Other High Risk HPV      Result:   Not Detected      Ref  Range: (Not Detected)  Sample:  HPV Interp      Result:         Ref Range: (Not Detected)  This test amplifies and detects DNA of 14 high-risk HPV types  associated with cervical cancer a nd its precursor lesions   (HPV types 16,18, 31, 33, 35, 39, 45, 51, 52, 56, 58, 59, 66, and  68). Sensitivity may be affected by specimen collection methods, stage of  infection, and the presence of interfering   substances. Results should be interpreted in conjunction with other  available laboratory and clinical data. A negative   high-risk HPV result does not exclude the possibility of future  cytologic HSIL or underlying CIN2-3 or cancer.         This test is intended for medical purposes only and is not valid for  the evaluation of suspected sexual abuse or for   other forensic purposes. Source:  A: Cervical material, (ThinPrep vial, Imaging-assisted review)    Clinical History  Co-Test:  ThinPrep Pap with high risk HPV testing       Amenorrhea N91.2, Missed menses N92.6      GYNECOLOGIC CYTOLOGY REPORT    Patient NameKirby Hill  [facility] Rec: [id number]  Path Number: [id number]  [place], [address]  [phone]  Fax: [phone]     Human papillomavirus (HPV) DNA probe thin prep high risk    Collection Time: 08/30/22  8:48 AM   Result Value Ref Range    Specimen Description . CERVIX     HPV Sample . THIN PREP     HPV, Genotype 16 Not Detected Not Detected    HPV, Genotype 18 Not Detected Not Detected    HPV, High Risk Other Not Detected Not Detected    HPV, Interpretation         Prenatal Profile 1    Collection Time: 08/30/22 11:40 AM   Result Value Ref Range    WBC 9.7 3.5 - 11.3 k/uL    RBC 4.02 3.95 - 5.11 m/uL    Hemoglobin 10.4 (L) 11.9 - 15.1 g/dL    Hematocrit 34.1 (L) 36.3 - 47.1 %    MCV 84.8 82.6 - 102.9 fL    MCH 25.9 25.2 - 33.5 pg    MCHC 30.5 28.4 - 34.8 g/dL    RDW 14.1 11.8 - 14.4 %    Platelets 274 428 - 372 k/uL    MPV 10.2 8.1 - 13.5 fL    NRBC Automated 0.0 0.0 per 100 WBC    Seg Neutrophils 70 (H) 36 - 65 %    Lymphocytes 23 (L) 24 - 43 %    Monocytes 5 3 - 12 %    Eosinophils % 1 1 - 4 %    Basophils 0 0 - 2 %    Immature Granulocytes 1 (H) 0 %    Segs Absolute 6.80 1.50 - 8.10 k/uL    Absolute Lymph # 2.19 1.10 - 3.70 k/uL    Absolute Mono # 0.52 0.10 - 1.20 k/uL    Absolute Eos # 0.07 0.00 - 0.44 k/uL    Basophils Absolute <0.03 0.00 - 0.20 k/uL    Absolute Immature Granulocyte 0.06 0.00 - 0.30 k/uL    Hepatitis B Surface Ag NONREACTIVE NONREACTIVE    Rubella Antibody, .7 IU/mL    T. pallidum, IgG NONREACTIVE NONREACTIVE   HIV Screen Collection Time: 08/30/22 11:40 AM   Result Value Ref Range    HIV Ag/Ab NONREACTIVE NONREACTIVE   Hepatitis C Antibody    Collection Time: 08/30/22 11:40 AM   Result Value Ref Range    Hepatitis C Ab NONREACTIVE NONREACTIVE   Hemoglobin A1C    Collection Time: 08/30/22 11:40 AM   Result Value Ref Range    Hemoglobin A1C 5.6 4.0 - 6.0 %    Estimated Avg Glucose 114 mg/dL   TSH With Reflex Ft4    Collection Time: 08/30/22 11:40 AM   Result Value Ref Range    TSH 0.56 0.30 - 5.00 uIU/mL   PRENATAL TYPE AND SCREEN    Collection Time: 08/30/22 11:40 AM   Result Value Ref Range    ABO/Rh A POSITIVE     Antibody Screen NEGATIVE    Vaginitis DNA Probe    Collection Time: 08/30/22 11:51 PM    Specimen: Vaginal   Result Value Ref Range    Source . VAGINAL SWAB     Trichomonas Vaginalis DNA NEGATIVE NEGATIVE    GARDNERELLA VAGINALIS, DNA PROBE NEGATIVE NEGATIVE    CANDIDA SPECIES, DNA PROBE NEGATIVE NEGATIVE   C.trachomatis N.gonorrhoeae DNA    Collection Time: 08/30/22 11:52 PM    Specimen: Cervix   Result Value Ref Range    Specimen Description . CERVIX     C. trachomatis DNA NEGATIVE NEGATIVE    N. gonorrhoeae DNA NEGATIVE NEGATIVE   Culture, Urine    Collection Time: 08/30/22 11:52 PM    Specimen: Urine, clean catch   Result Value Ref Range    Specimen Description . CLEAN CATCH URINE     Culture NO SIGNIFICANT GROWTH    Urinalysis    Collection Time: 08/30/22 11:57 PM   Result Value Ref Range    Color, UA Yellow Yellow    Turbidity UA Clear Clear    Glucose, Ur NEGATIVE NEGATIVE    Bilirubin Urine NEGATIVE NEGATIVE    Ketones, Urine TRACE (A) NEGATIVE    Specific Gravity, UA 1.010 1.005 - 1.030    Urine Hgb NEGATIVE NEGATIVE    pH, UA 7.0 5.0 - 8.0    Protein, UA NEGATIVE NEGATIVE    Urobilinogen, Urine Normal Normal    Nitrite, Urine NEGATIVE NEGATIVE    Leukocyte Esterase, Urine NEGATIVE NEGATIVE    Urinalysis Comments       Microscopic exam not performed based on chemical results unless requested in original order. Urine Drug Screen    Collection Time: 08/30/22 11:57 PM   Result Value Ref Range    Amphetamine Screen, Ur NEGATIVE NEGATIVE    Barbiturate Screen, Ur NEGATIVE NEGATIVE    Benzodiazepine Screen, Urine NEGATIVE NEGATIVE    Cocaine Metabolite, Urine NEGATIVE NEGATIVE    Methadone Screen, Urine NEGATIVE NEGATIVE    Opiates, Urine NEGATIVE NEGATIVE    Phencyclidine, Urine NEGATIVE NEGATIVE    Cannabinoid Scrn, Ur NEGATIVE NEGATIVE    Oxycodone Screen, Ur NEGATIVE NEGATIVE    Fentanyl, Ur NEGATIVE NEGATIVE    Test Information       Assay provides medical screening only. The absence of expected drug(s) and/or metabolite(s) may indicate diluted or adulterated urine, limitations of testing or timing of collection. Protein / Creatinine Ratio, Urine    Collection Time: 08/30/22 11:58 PM   Result Value Ref Range    Total Protein, Urine 15 mg/dL    Creatinine, Ur 93.2 28.0 - 217.0 mg/dL    Urine Total Protein Creatinine Ratio 0.16 0.00 - 0.20   US OB LESS THAN 14 WEEKS SINGLE OR FIRST GESTATION    Collection Time: 09/12/22 12:00 AM   Result Value Ref Range    CRL      Sac Diameter     MATERNAL SERUM SCREEN    Collection Time: 09/12/22  3:10 PM   Result Value Ref Range    Date of Birth [id number]     Maternal Weight 158     Patient Weight Units LB     Number of Fetuses 1     Monochorionic Twins INFORMATION NOT PROVIDED     Race (Maternal) WHITE     Smoking NO     Prev Trisomy Preg INFORMATION NOT PROVIDED     In Vitro Fertilization INFORMATION NOT PROVIDED     Donor Egg? INFORMATION NOT PROVIDED     Repeat Specimen?  INFORMATION NOT PROVIDED     ULTRASOUND DATE [id number]     Crown Rump Length 75.3     Nuchal Transluc (NT) 2.0     Sonographer Name [facility]     [place] [id number]     Franny VERDUGO Name Brooklyn Blanton MD Cert Num [id number]     JONN-A MOM PENDING     MoM for JONN-A PENDING     MS HCG PENDING     MS HCG MoM PENDING     Nuchal Transluc (NT) PENDING     MoM for NT PENDING     Nuchal Translucency Twin B PENDING     MoM for NT, Twin B PENDING     AFP Interp PENDING     Maternal Age At JERED PENDING     Patient Weight PENDING     Gestational Age PENDING     Number of Fetuses PENDING     Race PENDING     Smoking PENDING     History of Aneuploidy? PENDING     Specimen . BLOOD     CRL PENDING     Crown Rump Length Twin B PENDING     Sonographer Cert No PENDING     Sonographer Name PENDING     ULTRASOUND DATE PENDING     Maternal Screen, EER PENDING        Past Medical History:   Diagnosis Date    Anemia     Gestational diabetes mellitus     Migraine     Type 2 diabetes mellitus ([address]                                                                    Past Surgical History:   Procedure Laterality Date    CARPAL TUNNEL RELEASE Bilateral      SECTION      X3     SECTION N/A 2017     SECTION performed by Paul Ortega MD at [facility] OR    TONSILLECTOMY       Family History   Problem Relation Age of Onset    High Blood Pressure Mother     Diabetes type 2  Mother     Bone Cancer Father     No Known Problems Sister     No Known Problems Brother     Cancer Maternal Grandmother         29's    No Known Problems Paternal Grandmother     Cancer Paternal Grandfather         type unknown     Social History     Tobacco Use   Smoking Status Never   Smokeless Tobacco Never     Social History     Substance and Sexual Activity   Alcohol Use No       MEDICATIONS:  Current Outpatient Medications   Medication Sig Dispense Refill    SLOW RELEASE IRON 50 MG TBCR TAKE ONE TABLET BY MOUTH EVERY DAY FOR ANEMIA?? ????? ?????? ?? ??? ???? ????      folic acid (FOLVITE) 149 MCG tablet TAKE 2 TABLETS BY MOUTH DAILY EVERY DAY FOR ANEMIA ????? ????? ?? ???? ???? ?????? ?? ??? ????? ??? ????      ONETOUCH ULTRA strip USE TO TEST BLOOD SUGAR FOUR TIMES DAILY ??????? ??????? ??? ???? ???? ???? ??????       Lancets (ONETOUCH DELICA PLUS [id number]) MISC USE TO TEST BLOOD SUGAR FOUR TIMES DAILY ??????? ??????? ??? ???? ???? ???? ?????? Alcohol Swabs (ULTRA-CARE ALCOHOL PREP PADS) 70 % PADS USE TO TEST BLOOD SUGAR FOUR TIMES DAILY ??????? ??????? ??? ???? ???? ???? ??????      CLEARLAX 17 GM/SCOOP powder MIX 17GRAMS (1 CAPFUL) IN 8 OUNCES OF JUICE OR WATER AND DRINK BY MOUTH DAILY FOR CONSTIPATION ? ??? 17 ??????  ?? 8 ?????? ?? ?????? ?? ????      Cyanocobalamin (VITAMIN B-12 PO) Take 1 tablet by mouth Daily      Cholecalciferol (VITAMIN D3) 1000 UNITS TABS       Prenatal Vit-Fe Fumarate-FA (VOL-NIA) 28-1 MG TABS        No current facility-administered medications for this visit. ALLERGIES:  Adhesive tape    Reviewed global and practice OB care including nausea measures, nutrition, activities, warning signs, and contact information. Offered cell free DNA screen,NT echo and WIC .    `--------------------------------------------------------------------------  Genetic Screening/Teratology Counseling  (Include patient, FOB or anyone in either family)    1) Patient's age 28 years or > at JERED: Yes at the time of delivery pt will be over 35  2) Thalassemia (Mediterranean, ): No  3) Neural Tube Defect:   No  4) Congenital heart defect:   No  5) Trisomy (e.g. Down Syndrome):  No  6) Terry-sachs (Confucianist, [address]): No  7) Multiple Births:    Yes Pt had twins in 2017 but daughter IUFD at delivery. Many cousin twins.   8) Sickle cell (disease or trait):  No  9) Hemophilia or blood disorders:  Yes  10) Muscular Dystrophy:   No  11) Cystic Fibrosis:    No  12) Yakov's chorea:   No  13) Mental retardation/Autism :  No   If yes, was person tested for fragile X: No  14) Other inherited genetic/chromosomal disorder: No  15) Maternal metabolic disorder (DM, PKU): Yes, Pt type 2 diet controlled  16) Child with birth defect not listed:  No  17) Recurrent pregnancy loss/stillbirth: Yes  18) Medications, supplements/illicit or   Recreational drugs/alcohol since LMP: No   List: none  19) Any other: none    Comments/Counseling: Pt present for ACOG and OB education with nurse assisted by   Kateryna. -POC pt to have 20wk anatomy scan at [facility], scheduled on 11/08/22  -Pt currently pre-diabetic(type-2 diabetes) already had consult with [facility] with her First trimester screen 9/12/22.     -------------------------------------------------------------------------  Infection History:    1) Live with someone with TB/exposed to TB: No  2) Patient/partner has h/o genital herpes: No  3) Rash/viral illness since LMP:  No  4) History of STD:    No  5) Other: No  -------------------------------------------------------------------------     Check list reviewed with New OB patient:    [facility]  -Delivery only at [facility]  -Several providers in practice and only doctors do deliveries  -May reach practice via [address] or phone. [employer] messages are only answered M-Fri when office is open. Testing  -Genetic testing (NIPT, AFP and/or referral to [facility])  -Testing per ACOG guidelines that are needed for any pregnancy  -Testing may be added according to your needs or if you become high risk  -Normal pregnancy US, one dating and one 20 week anatomy scan  -referral to [facility] each patient 20 week Ultrasound only    Appts:  -q 4 wks until your 28 wks  -@ 28wk q2wks  -@ 36wks q week  -this changes if you have increased risk factors    Diet:  -Nothing unpasteurized  -Lunch meats need to be steamed or heated  -Meats need to be thoroughly cooked, nothing pink or bldg  -Caffeine MAX per ACOG 16 oz/per day  -Artifical sweetener, limit no confirmed abnormal findings with development of fetus   -Fish, detailed list provided in OB packet, avoid large fish and only so many oz per week  -If you are vegan or vegetarian. OB pt needs 60 gm protein per day. -Caution with dehydration may cause cramping.      Exercise,Traveling and safety  -No sit ups after 14 weeks  -HR needs to be <160  -No heavy squatting  -nothing where you can fall and hurt yourself  (your center of gravity is not same when pregnant)  -Ask your provider if it's safe for you to fly  -long travel need to get up and walk around after 2 hours  -wear seat belt below gravid abd  -good support socks while traveling to aid with circulation    Advise  -Review need for vaccines in pregnancy COVID, FLU and T-dap,   -No swimming in natural bodies of water, lakes, ponds or oceans  -No sauna's or hot tubs   -Bug spray, nothing with Deet in it  -Seeing dentist once per pregnancy, any infection can cause  labor, will need note for dentist

## 2022-09-15 LAB
AFP INTERPRETATION: NORMAL
CRL: 75.3 MM
CROWN RUMP LENGTH TWIN B: NORMAL MM
CROWN RUMP LENGTH: 75.3
DATE OF BIRTH: NORMAL
DONOR EGG?: NORMAL
GESTATIONAL AGE: NORMAL
HISTORY OF ANEUPLOIDY?: NORMAL
IN VITRO FERTILIZATION: NORMAL
MATERNAL AGE AT EDD: 35.1 YR
MATERNAL SCREEN, EER: NORMAL
MATERNAL WEIGHT: 158
MOM FOR NT, TWIN B: NORMAL
MOM FOR NT: 1.11
MOM FOR PAPP-A: 0.84
MONOCHORIONIC TWINS: NORMAL
MSHCG-MOM: 1.31
MSHCG: NORMAL IU/L
NUCHAL TRANSLUC (NT): 2
NUCHAL TRANSLUC (NT): 2 MM
NUCHAL TRANSLUCENCY TWIN B: NORMAL MM
NUMBER OF FETUSES: 1
NUMBER OF FETUSES: NORMAL
PAPP-A MOM: 1120.4 NG/ML
PATIENT WEIGHT UNITS: NORMAL
PATIENT WEIGHT: NORMAL
PREV TRISOMY PREG: NORMAL
RACE (MATERNAL): NORMAL
RACE: NORMAL
READING MD CERT NUM: NORMAL
READING MD NAME: NORMAL
REPEAT SPECIMEN?: NORMAL
SMOKING: NO
SMOKING: NO
SONOGRAPHER CERT NO: NORMAL
SONOGRAPHER CERT NO: NORMAL
SONOGRAPHER NAME: NORMAL
SONOGRAPHER NAME: NORMAL
SPECIMEN: NORMAL
ULTRASOUND DATE: NORMAL
ULTRASOUND DATE: NORMAL

## 2022-09-27 RX ORDER — LETROZOLE 2.5 MG/1
TABLET, FILM COATED ORAL
Qty: 10 TABLET | Refills: 5 | OUTPATIENT
Start: 2022-09-27

## 2022-10-10 ENCOUNTER — ROUTINE PRENATAL (OUTPATIENT)
Dept: PERINATAL CARE | Age: 32
End: 2022-10-10
Payer: MEDICARE

## 2022-10-10 ENCOUNTER — HOSPITAL ENCOUNTER (OUTPATIENT)
Age: 32
Discharge: HOME | End: 2022-10-10
Payer: MEDICARE

## 2022-10-10 VITALS
SYSTOLIC BLOOD PRESSURE: 116 MMHG | HEART RATE: 92 BPM | TEMPERATURE: 97.9 F | RESPIRATION RATE: 16 BRPM | DIASTOLIC BLOOD PRESSURE: 76 MMHG | WEIGHT: 156 LBS | BODY MASS INDEX: 30.63 KG/M2 | HEIGHT: 60 IN

## 2022-10-10 DIAGNOSIS — O24.119 PRE-EXISTING TYPE 2 DIABETES MELLITUS DURING PREGNANCY, ANTEPARTUM: ICD-10-CM

## 2022-10-10 DIAGNOSIS — O34.219 PREVIOUS CESAREAN DELIVERY, ANTEPARTUM CONDITION OR COMPLICATION: ICD-10-CM

## 2022-10-10 DIAGNOSIS — O09.522 MULTIGRAVIDA OF ADVANCED MATERNAL AGE IN SECOND TRIMESTER: Primary | ICD-10-CM

## 2022-10-10 DIAGNOSIS — O99.212 OBESITY AFFECTING PREGNANCY IN SECOND TRIMESTER: ICD-10-CM

## 2022-10-10 DIAGNOSIS — Z3A.17 17 WEEKS GESTATION OF PREGNANCY: ICD-10-CM

## 2022-10-10 LAB
ESTIMATED AVERAGE GLUCOSE: 114 MG/DL
HBA1C MFR BLD: 5.6 % (ref 4–6)

## 2022-10-10 PROCEDURE — 82105 ALPHA-FETOPROTEIN SERUM: CPT

## 2022-10-10 PROCEDURE — 99244 OFF/OP CNSLTJ NEW/EST MOD 40: CPT | Performed by: OBSTETRICS & GYNECOLOGY

## 2022-10-10 PROCEDURE — 36415 COLL VENOUS BLD VENIPUNCTURE: CPT

## 2022-10-10 PROCEDURE — 83036 HEMOGLOBIN GLYCOSYLATED A1C: CPT

## 2022-10-10 PROCEDURE — G8427 DOCREV CUR MEDS BY ELIG CLIN: HCPCS | Performed by: OBSTETRICS & GYNECOLOGY

## 2022-10-10 PROCEDURE — 2022F DILAT RTA XM EVC RTNOPTHY: CPT | Performed by: OBSTETRICS & GYNECOLOGY

## 2022-10-10 PROCEDURE — G8484 FLU IMMUNIZE NO ADMIN: HCPCS | Performed by: OBSTETRICS & GYNECOLOGY

## 2022-10-10 PROCEDURE — G8417 CALC BMI ABV UP PARAM F/U: HCPCS | Performed by: OBSTETRICS & GYNECOLOGY

## 2022-10-11 LAB
SEND OUT REPORT: NORMAL
TEST NAME: NORMAL

## 2022-10-12 LAB
AFP INTERPRETATION: NORMAL
AFP MOM: 0.94
AFP SPECIMEN: NORMAL
AFP: 38 NG/ML
DATE OF BIRTH: NORMAL
DATING METHOD: NORMAL
DETERMINED BY: NORMAL
DIABETIC: NO
DONOR EGG?: NORMAL
DUE DATE: NORMAL
ESTIMATED DUE DATE: NORMAL
FAMILY HISTORY NTD: NO
GESTATIONAL AGE: NORMAL
IN VITRO FERTILIZATION: NORMAL
INSULIN REQ DIABETES: NO
LAST MENSTRUAL PERIOD: NORMAL
MATERNAL AGE AT EDD: 35.1 YR
MATERNAL WEIGHT: 156
MONOCHORIONIC TWINS: NORMAL
NUMBER OF FETUSES: NORMAL
PATIENT WEIGHT UNITS: NORMAL
PATIENT WEIGHT: NORMAL
RACE (MATERNAL): NORMAL
RACE: NORMAL
REPEAT SPECIMEN?: NO
SMOKING: NO
SMOKING: NORMAL
VALPROIC/CARBAMAZEP: NORMAL
ZZ NTE CLEAN UP: HISTORY: NO

## 2022-10-18 PROBLEM — O34.219 PREVIOUS CESAREAN DELIVERY, ANTEPARTUM CONDITION OR COMPLICATION: Status: RESOLVED | Noted: 2017-05-02 | Resolved: 2022-10-18

## 2022-10-19 ENCOUNTER — ROUTINE PRENATAL (OUTPATIENT)
Dept: OBGYN CLINIC | Age: 32
End: 2022-10-19
Payer: MEDICARE

## 2022-10-19 VITALS
DIASTOLIC BLOOD PRESSURE: 74 MMHG | WEIGHT: 160 LBS | SYSTOLIC BLOOD PRESSURE: 110 MMHG | BODY MASS INDEX: 31.25 KG/M2 | HEART RATE: 104 BPM

## 2022-10-19 DIAGNOSIS — O09.92 SUPERVISION OF HIGH RISK PREGNANCY IN SECOND TRIMESTER: Primary | ICD-10-CM

## 2022-10-19 DIAGNOSIS — Z23 NEEDS FLU SHOT: ICD-10-CM

## 2022-10-19 DIAGNOSIS — Z3A.18 18 WEEKS GESTATION OF PREGNANCY: ICD-10-CM

## 2022-10-19 PROCEDURE — 90471 IMMUNIZATION ADMIN: CPT | Performed by: OBSTETRICS & GYNECOLOGY

## 2022-10-19 PROCEDURE — 90715 TDAP VACCINE 7 YRS/> IM: CPT | Performed by: OBSTETRICS & GYNECOLOGY

## 2022-10-19 PROCEDURE — 99212 OFFICE O/P EST SF 10 MIN: CPT | Performed by: OBSTETRICS & GYNECOLOGY

## 2022-10-19 NOTE — PROGRESS NOTES
Trent Fuchs is a [id number] @ 18w4d who presents for JODI visit. She denies LOF, VB or Ctxs.  - FM. She has been taking her blood sugars and they were sent to [facility]. She denies any fevers/chills, SOB, cough, sore throat, loss of taste/smell or sick contacts. Pt denies any HA, vision changes or RUQ pain.  used for appointment    O:  Vitals:    10/19/22 1009   BP: 110/74   Pulse: (!) 104     Gen: NAD  Abd: soft, nontender, gravid  Ext:  no edema      BP: 110/74  Weight: 160 lb (72.6 kg)  Heart Rate: (!) 104  Patient Position: Sitting  Fetal HR: 145  Movement: Absent    A/P:  Patient Active Problem List    Diagnosis Date Noted    Needs flu shot 10/19/2022     Priority: Medium    Multigravida of advanced maternal age in first trimester 08/30/2022     Priority: Medium    Use of letrozole (Femara) 08/30/2022     Priority: Medium     Conception on femara      PCOS (polycystic ovarian syndrome) 02/21/2022    Medication exposure during first trimester of pregnancy 02/09/2021     Topamax      RLTCS 11/9/17 F APG 8/9 wt ? 11/09/2017    Rh+/RI/GBS unk 08/23/2017    Type 2 DM- Class B 05/02/2017     TSH: WNL, HbA1c: 5.6, Pr:Cr WNL, EKG: pending  NST/BPPs @ 32 wks  Growth q 3-4 wks        H/O C/S x4 04/25/2017     x4         Discussed updated COVID precautions and policies. Reviewed updated visitor policy. Encouraged social distancing and appropriate hand washing/hygiene practices. Reviewed symptoms suspicious for COVID infection. Discussed that ACOG, SMFM, and the CDC recommend to not withold immunization in pregnant and breastfeeding women who meet criteria for receipt of the vaccine based on the ACIP recommended priority groups. All questions answered. Patient vocalized understanding.     [facility] appt 11/7  Flu shot today  Discussed s/sx that should prompt call to the office  Discussed kick counts  RTC in 4 wks    iLly Hou MD

## 2022-10-19 NOTE — PROGRESS NOTES
After obtaining consent, and per orders of Dr. Leticia Rojas, injection of Tdap given in Left deltoid by Ani Vazquez. Patient instructed to remain in clinic for 20 minutes afterwards, and to report any adverse reaction to me immediately.

## 2022-10-20 ENCOUNTER — OFFICE VISIT (OUTPATIENT)
Dept: OBGYN CLINIC | Age: 32
End: 2022-10-20
Payer: MEDICARE

## 2022-10-20 DIAGNOSIS — Z23 NEEDS FLU SHOT: Primary | ICD-10-CM

## 2022-10-20 DIAGNOSIS — Z3A.18 18 WEEKS GESTATION OF PREGNANCY: ICD-10-CM

## 2022-10-20 PROCEDURE — 90674 CCIIV4 VAC NO PRSV 0.5 ML IM: CPT | Performed by: STUDENT IN AN ORGANIZED HEALTH CARE EDUCATION/TRAINING PROGRAM

## 2022-10-20 PROCEDURE — 90471 IMMUNIZATION ADMIN: CPT | Performed by: STUDENT IN AN ORGANIZED HEALTH CARE EDUCATION/TRAINING PROGRAM

## 2022-10-20 NOTE — PROGRESS NOTES
After obtaining consent, and per orders of Dr. Pawan Jerez, injection of Flu vaccine given in Right deltoid by Karina Chang. Patient instructed to remain in clinic for 20 minutes afterwards, and to report any adverse reaction to me immediately.

## 2022-10-24 ENCOUNTER — TELEPHONE (OUTPATIENT)
Dept: PERINATAL CARE | Age: 32
End: 2022-10-24

## 2022-10-25 RX ORDER — LETROZOLE 2.5 MG/1
TABLET, FILM COATED ORAL
Qty: 10 TABLET | Refills: 1 | OUTPATIENT
Start: 2022-10-25

## 2022-11-07 ENCOUNTER — ROUTINE PRENATAL (OUTPATIENT)
Dept: PERINATAL CARE | Age: 32
End: 2022-11-07
Payer: MEDICARE

## 2022-11-07 ENCOUNTER — TELEPHONE (OUTPATIENT)
Dept: PERINATAL CARE | Age: 32
End: 2022-11-07

## 2022-11-07 VITALS
DIASTOLIC BLOOD PRESSURE: 77 MMHG | WEIGHT: 160 LBS | TEMPERATURE: 98.1 F | SYSTOLIC BLOOD PRESSURE: 123 MMHG | HEART RATE: 100 BPM | BODY MASS INDEX: 31.41 KG/M2 | RESPIRATION RATE: 16 BRPM | HEIGHT: 60 IN

## 2022-11-07 DIAGNOSIS — O99.212 OBESITY AFFECTING PREGNANCY IN SECOND TRIMESTER: ICD-10-CM

## 2022-11-07 DIAGNOSIS — O24.119 PRE-EXISTING TYPE 2 DIABETES MELLITUS DURING PREGNANCY, ANTEPARTUM: ICD-10-CM

## 2022-11-07 DIAGNOSIS — O09.522 MULTIGRAVIDA OF ADVANCED MATERNAL AGE IN SECOND TRIMESTER: Primary | ICD-10-CM

## 2022-11-07 DIAGNOSIS — Z36.86 ENCOUNTER FOR SCREENING FOR RISK OF PRE-TERM LABOR: ICD-10-CM

## 2022-11-07 DIAGNOSIS — O28.5 ABNORMAL GENETIC TEST DURING PREGNANCY: ICD-10-CM

## 2022-11-07 DIAGNOSIS — Z3A.21 21 WEEKS GESTATION OF PREGNANCY: ICD-10-CM

## 2022-11-07 DIAGNOSIS — O34.219 PREVIOUS CESAREAN DELIVERY, ANTEPARTUM CONDITION OR COMPLICATION: ICD-10-CM

## 2022-11-07 LAB
ABDOMINAL CIRCUMFERENCE: NORMAL
ABDOMINAL CIRCUMFERENCE: NORMAL
BIPARIETAL DIAMETER: NORMAL
BIPARIETAL DIAMETER: NORMAL
ESTIMATED FETAL WEIGHT: NORMAL
ESTIMATED FETAL WEIGHT: NORMAL
FEMORAL DIAMETER: NORMAL
FEMORAL DIAMETER: NORMAL
HC/AC: NORMAL
HC/AC: NORMAL
HEAD CIRCUMFERENCE: NORMAL
HEAD CIRCUMFERENCE: NORMAL

## 2022-11-07 PROCEDURE — 76811 OB US DETAILED SNGL FETUS: CPT | Performed by: OBSTETRICS & GYNECOLOGY

## 2022-11-07 PROCEDURE — 76817 TRANSVAGINAL US OBSTETRIC: CPT | Performed by: OBSTETRICS & GYNECOLOGY

## 2022-11-07 PROCEDURE — 99999 PR OFFICE/OUTPT VISIT,PROCEDURE ONLY: CPT | Performed by: OBSTETRICS & GYNECOLOGY

## 2022-11-07 RX ORDER — ASPIRIN 81 MG
TABLET,CHEWABLE ORAL
COMMUNITY
Start: 2022-10-24

## 2022-11-07 NOTE — PROGRESS NOTES
Pt did not bring blood sugars and states is planning to travel out of the country \"tomorrow-for 1mos\"

## 2022-11-07 NOTE — TELEPHONE ENCOUNTER
Pt here in office and blood sugar log reviewed with pt Dr. Marcia Barrera recommendation to start insulin before each meal and at bedtime. Multiple attempts made to reach pt by phone without success. Instructions given today at her [facility] visit. Instructed pt and  on need to start insulin and pt agreed and  requested to call into [facility]. Writer called into above pharmacy at [phone], Ludy Berg, NPH insulin, 10u subq at bedtime daily, 1mos supply with needles, syringes and alcohol pads, no refills. Novolog insulin, 6u subq,  before each meal,   1mos supply with needles, syringes and alcohol pads, no refills.

## 2022-11-07 NOTE — PROGRESS NOTES
Glycemic control not assessed because patient failed to bring documentation of blood glucose monitoring and/or patient not monitoring blood sugars as previously advised. Insulin regimen advised 10/20/2022: Start 6 units Subcutaneous (SQ) Novolog before all meals and advised NPH qhs 10 units at bedtime. Patient did not start insulin regimen as previously advised. Please start to send blood glucose monitoring information to [facility] office so that insulin and/or dietary changes can be made if necessary weekly. Diabetic education/nutrition counseling advised. Start recommended ADA diet therapy for diabetes. Compliance with regular prenatal care and blood sugar monitoring strongly encouraged. Strongly advised patient to be compliant with blood sugar monitoring as previously advised to minimize the potential of adverse  outcomes (e.g. fetal demise/stillbirth, congenital birth/heart defects, polyhydramnios/oligohydramnios, fetal growth abnormalities, pregnancy loss, hypertensive disorders of pregnancy, indicated  delivery, etc.), potential maternal morbidities, etc.     I would advise continuation of daily oral baby aspirin 81 mg based on guidelines by the USPSTF/ACOG (for preeclampsia prevention for pregnant women at \"high-risk\"  for preeclampsia). Patient declined invasive prenatal diagnostic testing (including evaluating for fetal thalassemia status, fetal aneuploidy, fetal microdeletions, fetal single gene etiologies, fetal microarray, etc.) today. Please refer to completed maternal carrier testing results (with the [place] test from [address] [place] [address]). Please refer to non-invasive prenatal testing/NIPT results (with the [place] Complete test from [facility]). Advise paternal carrier screening to be performed on the father of the fetus: secondary to positive maternal carrier mutation testing results.

## 2022-12-12 ENCOUNTER — ROUTINE PRENATAL (OUTPATIENT)
Dept: PERINATAL CARE | Age: 32
End: 2022-12-12
Payer: MEDICARE

## 2022-12-12 ENCOUNTER — TELEPHONE (OUTPATIENT)
Dept: PERINATAL CARE | Age: 32
End: 2022-12-12

## 2022-12-12 VITALS
WEIGHT: 163 LBS | SYSTOLIC BLOOD PRESSURE: 137 MMHG | HEIGHT: 60 IN | BODY MASS INDEX: 32 KG/M2 | RESPIRATION RATE: 16 BRPM | DIASTOLIC BLOOD PRESSURE: 82 MMHG | HEART RATE: 75 BPM

## 2022-12-12 DIAGNOSIS — O34.219 PREVIOUS CESAREAN DELIVERY, ANTEPARTUM CONDITION OR COMPLICATION: ICD-10-CM

## 2022-12-12 DIAGNOSIS — Z36.4 ULTRASOUND FOR ANTENATAL SCREENING FOR FETAL GROWTH RESTRICTION: ICD-10-CM

## 2022-12-12 DIAGNOSIS — O09.522 MULTIGRAVIDA OF ADVANCED MATERNAL AGE IN SECOND TRIMESTER: ICD-10-CM

## 2022-12-12 DIAGNOSIS — O99.212 OBESITY AFFECTING PREGNANCY IN SECOND TRIMESTER: ICD-10-CM

## 2022-12-12 DIAGNOSIS — O24.119 PRE-EXISTING TYPE 2 DIABETES MELLITUS DURING PREGNANCY, ANTEPARTUM: Primary | ICD-10-CM

## 2022-12-12 DIAGNOSIS — O28.5 ABNORMAL GENETIC TEST DURING PREGNANCY: ICD-10-CM

## 2022-12-12 DIAGNOSIS — Z3A.26 26 WEEKS GESTATION OF PREGNANCY: ICD-10-CM

## 2022-12-12 LAB
ABDOMINAL CIRCUMFERENCE: NORMAL
BIPARIETAL DIAMETER: NORMAL
ESTIMATED FETAL WEIGHT: NORMAL
FEMORAL DIAMETER: NORMAL
HC/AC: NORMAL
HEAD CIRCUMFERENCE: NORMAL

## 2022-12-12 PROCEDURE — 76825 ECHO EXAM OF FETAL HEART: CPT | Performed by: OBSTETRICS & GYNECOLOGY

## 2022-12-12 PROCEDURE — 76816 OB US FOLLOW-UP PER FETUS: CPT | Performed by: OBSTETRICS & GYNECOLOGY

## 2022-12-12 PROCEDURE — 99999 PR OFFICE/OUTPT VISIT,PROCEDURE ONLY: CPT | Performed by: OBSTETRICS & GYNECOLOGY

## 2022-12-12 PROCEDURE — 76827 ECHO EXAM OF FETAL HEART: CPT | Performed by: OBSTETRICS & GYNECOLOGY

## 2022-12-12 PROCEDURE — 93325 DOPPLER ECHO COLOR FLOW MAPG: CPT | Performed by: OBSTETRICS & GYNECOLOGY

## 2022-12-12 RX ORDER — MULTIVITAMIN/IRON/FOLIC ACID 18MG-0.4MG
TABLET ORAL
COMMUNITY
Start: 2022-10-24

## 2022-12-12 RX ORDER — DOXYLAMINE SUCCINATE AND PYRIDOXINE HYDROCHLORIDE 10; 10 MG/1; MG/1
TABLET, DELAYED RELEASE ORAL
COMMUNITY
Start: 2022-10-25

## 2022-12-12 RX ORDER — FUROSEMIDE 20 MG/1
TABLET ORAL
COMMUNITY
Start: 2022-10-24

## 2022-12-12 RX ORDER — MEDROXYPROGESTERONE ACETATE 10 MG/1
TABLET ORAL
COMMUNITY
Start: 2022-10-24

## 2022-12-12 RX ORDER — CETIRIZINE HYDROCHLORIDE 10 MG/1
TABLET ORAL
COMMUNITY
Start: 2022-10-24

## 2022-12-12 RX ORDER — LANOLIN ALCOHOL/MO/W.PET/CERES
CREAM (GRAM) TOPICAL
COMMUNITY
Start: 2022-10-24

## 2022-12-12 RX ORDER — HYDROXYZINE HYDROCHLORIDE 25 MG/1
TABLET, FILM COATED ORAL
COMMUNITY
Start: 2022-10-24

## 2022-12-12 NOTE — TELEPHONE ENCOUNTER
Pt here in office and Dr. Kylie Devries reviewed blood sugar log. Dr. Kylie Devries ordered pt to increase Novolog before each meal and NPH at bedtime. Pt verb. understanding  and asked to have called into [facility]. Writer called into above pharmacy, [phone], Magali Jennings, Novolog insulin to 10u before each meal, with needles, 1mos supply and no refills.    NPH insulin increased to 16u at bedtime,

## 2022-12-12 NOTE — PROGRESS NOTES
Blood sugars reviewed (insulin regimen adjusted, as below). Insulin regimen increased to: 10 units Novolog before all meals (consistently elevated postprandial values). Insulin regimen increased to: NPH Insulin subcutaneously 16 units before bedtime  (consistently elevated fasting blood sugars above 90's). Please start to send blood glucose monitoring information to [facility] office so that insulin and/or dietary changes can be made if necessary weekly. Diabetic education/nutrition counseling advised. Start recommended ADA diet therapy for diabetes. Compliance with regular prenatal care and blood sugar monitoring strongly encouraged.      Strongly advised patient to be compliant with blood sugar monitoring as previously advised to minimize the potential of adverse  outcomes (e.g. fetal demise/stillbirth, congenital birth/heart defects, polyhydramnios/oligohydramnios, fetal growth abnormalities, pregnancy loss, hypertensive disorders of pregnancy, indicated  delivery, etc.), potential maternal morbidities, etc.

## 2022-12-29 NOTE — PROGRESS NOTES
Charlene Powell is a [id number] @ 28w6d who presents for JODI visit. She denies LOF, VB or Ctxs.  + FM. She is having some lower back pain when standing for long periods of time. She is having some spasms in the RLQ. She says it is better than it was and is more intermittent. She has been having some feelings of SOB and says it is worse at night. She says she sometimes wakes up at night and feels she can't catch her breath. She denies any issues with SOB during the day and walking. She denies any fevers/chills, SOB, cough, sore throat, loss of taste/smell or sick contacts. Pt denies any HA, vision changes or RUQ pain. O:  Vitals:    12/30/22 1219   BP: 117/80   Pulse: (!) 104     Gen: NAD  Abd: soft, nontender, gravid  Ext:  no edema      BP: 117/80  Weight: 165 lb (74.8 kg)  Heart Rate: (!) 104  Patient Position: Sitting  Fundal Height (cm): 27 cm  Fetal HR: 145  Movement: Present    A/P:  Patient Active Problem List    Diagnosis Date Noted    Needs flu shot 10/19/2022     Priority: Medium     Given 10/19/22      Multigravida of advanced maternal age in first trimester 08/30/2022     Priority: Medium    Use of letrozole (Femara) 08/30/2022     Priority: Medium     Conception on femara      PCOS (polycystic ovarian syndrome) 02/21/2022    Medication exposure during first trimester of pregnancy 02/09/2021     Topamax      RLTCS 11/9/17 F APG 8/9 wt ? 11/09/2017    Rh+/RI/GBS unk 08/23/2017    Type 2 DM- Class B 05/02/2017     TSH: WNL, HbA1c: 5.6, Pr:Cr WNL, EKG: pending  NST/BPPs @ 32 wks  Growth q 3-4 wks  Novolog 10/10/10; NPH 16 u        H/O C/S x4 04/25/2017     x4         Discussed updated COVID precautions and policies. Reviewed updated visitor policy. Encouraged social distancing and appropriate hand washing/hygiene practices. Reviewed symptoms suspicious for COVID infection.  Discussed that ACOG, SMFM, and the CDC recommend to not withold immunization in pregnant and breastfeeding women who meet criteria for receipt of the vaccine based on the ACIP recommended priority groups. All questions answered. Patient vocalized understanding. Reviewed BS logs  Discussed using benadryl vs atarax to help with anxiety and sleep better.   Benadryl sent in  Discussed s/sx that should prompt call to the office  Discussed caleb nation  RTC in 2 wks    Angel Nation MD

## 2022-12-30 ENCOUNTER — ROUTINE PRENATAL (OUTPATIENT)
Dept: OBGYN CLINIC | Age: 32
End: 2022-12-30
Payer: MEDICARE

## 2022-12-30 VITALS
WEIGHT: 165 LBS | HEART RATE: 104 BPM | BODY MASS INDEX: 32.22 KG/M2 | SYSTOLIC BLOOD PRESSURE: 117 MMHG | DIASTOLIC BLOOD PRESSURE: 80 MMHG

## 2022-12-30 DIAGNOSIS — O24.119 PRE-EXISTING TYPE 2 DIABETES MELLITUS DURING PREGNANCY, ANTEPARTUM: ICD-10-CM

## 2022-12-30 PROCEDURE — 99212 OFFICE O/P EST SF 10 MIN: CPT | Performed by: OBSTETRICS & GYNECOLOGY

## 2022-12-30 RX ORDER — DIPHENHYDRAMINE HCL 25 MG
25 CAPSULE ORAL NIGHTLY PRN
Qty: 30 CAPSULE | Refills: 2 | Status: SHIPPED | OUTPATIENT
Start: 2022-12-30 | End: 2023-01-29

## 2023-01-09 ENCOUNTER — ROUTINE PRENATAL (OUTPATIENT)
Dept: PERINATAL CARE | Age: 33
End: 2023-01-09
Payer: MEDICARE

## 2023-01-09 VITALS
SYSTOLIC BLOOD PRESSURE: 123 MMHG | HEART RATE: 100 BPM | HEIGHT: 60 IN | TEMPERATURE: 97.9 F | BODY MASS INDEX: 32.39 KG/M2 | WEIGHT: 165 LBS | DIASTOLIC BLOOD PRESSURE: 69 MMHG | RESPIRATION RATE: 16 BRPM

## 2023-01-09 DIAGNOSIS — Z3A.18 18 WEEKS GESTATION OF PREGNANCY: Primary | ICD-10-CM

## 2023-01-09 DIAGNOSIS — Z13.89 ENCOUNTER FOR ROUTINE SCREENING FOR MALFORMATION USING ULTRASONICS: ICD-10-CM

## 2023-01-09 DIAGNOSIS — O24.119 PRE-EXISTING TYPE 2 DIABETES MELLITUS DURING PREGNANCY, ANTEPARTUM: ICD-10-CM

## 2023-01-09 DIAGNOSIS — Z36.4 ULTRASOUND FOR ANTENATAL SCREENING FOR FETAL GROWTH RESTRICTION: ICD-10-CM

## 2023-01-09 DIAGNOSIS — Z3A.30 30 WEEKS GESTATION OF PREGNANCY: ICD-10-CM

## 2023-01-09 DIAGNOSIS — O99.213 OBESITY AFFECTING PREGNANCY IN THIRD TRIMESTER: ICD-10-CM

## 2023-01-09 DIAGNOSIS — O28.5 ABNORMAL GENETIC TEST DURING PREGNANCY: Primary | ICD-10-CM

## 2023-01-09 DIAGNOSIS — O09.523 MULTIGRAVIDA OF ADVANCED MATERNAL AGE IN THIRD TRIMESTER: ICD-10-CM

## 2023-01-09 PROCEDURE — G8427 DOCREV CUR MEDS BY ELIG CLIN: HCPCS | Performed by: OBSTETRICS & GYNECOLOGY

## 2023-01-09 PROCEDURE — 76805 OB US >/= 14 WKS SNGL FETUS: CPT | Performed by: OBSTETRICS & GYNECOLOGY

## 2023-01-09 PROCEDURE — 2022F DILAT RTA XM EVC RTNOPTHY: CPT | Performed by: OBSTETRICS & GYNECOLOGY

## 2023-01-09 PROCEDURE — 99244 OFF/OP CNSLTJ NEW/EST MOD 40: CPT | Performed by: OBSTETRICS & GYNECOLOGY

## 2023-01-09 PROCEDURE — 76819 FETAL BIOPHYS PROFIL W/O NST: CPT | Performed by: OBSTETRICS & GYNECOLOGY

## 2023-01-09 PROCEDURE — G8417 CALC BMI ABV UP PARAM F/U: HCPCS | Performed by: OBSTETRICS & GYNECOLOGY

## 2023-01-09 PROCEDURE — G8482 FLU IMMUNIZE ORDER/ADMIN: HCPCS | Performed by: OBSTETRICS & GYNECOLOGY

## 2023-01-09 RX ORDER — CHOLECALCIFEROL (VITAMIN D3) 125 MCG
CAPSULE ORAL
COMMUNITY
Start: 2022-11-21

## 2023-01-09 RX ORDER — LANOLIN ALCOHOL/MO/W.PET/CERES
CREAM (GRAM) TOPICAL
COMMUNITY
Start: 2022-11-21

## 2023-01-09 NOTE — PROGRESS NOTES
Purcell Bloch is a  @ 30w3d who presents for JODI visit. She denies LOF, VB or Ctxs.  + FM. She denies any complaints except for some cramping on the right side. She denies any fevers/chills, SOB, cough, sore throat, loss of taste/smell or sick contacts. Pt denies any HA, vision changes or RUQ pain. Interpretor used. O:  Vitals:    01/10/23 1032   BP: 113/77   Pulse: 85     Gen: NAD  Abd: soft, nontender, gravid  Ext:  no edema      BP: 113/77  Weight: 165 lb (74.8 kg)  Heart Rate: 85  Patient Position: Sitting  Fundal Height (cm): 31 cm  Fetal HR: 140  Movement: Present    A/P:  Patient Active Problem List    Diagnosis Date Noted    Needs flu shot 10/19/2022     Priority: Medium     Given 10/19/22      Multigravida of advanced maternal age in first trimester 2022     Priority: Medium    Use of letrozole (Femara) 2022     Priority: Medium     Conception on femara      PCOS (polycystic ovarian syndrome) 2022    Medication exposure during first trimester of pregnancy 2021     Topamax      RLTCS 17 F APG  wt ? 2017    Rh+/RI/GBS unk 2017    Type 2 DM- Class B 2017     TSH: WNL, HbA1c: 5.6, Pr:Cr WNL, EKG: pending  NST/BPPs @ 32 wks  Growth q 3-4 wks  Novolog 10/10/10; NPH 16 u        H/O C/S x4 04/25/2017     x4         Discussed updated COVID precautions and policies. Reviewed updated visitor policy. Encouraged social distancing and appropriate hand washing/hygiene practices. Reviewed symptoms suspicious for COVID infection. Discussed that ACOG, SMFM, and the CDC recommend to not withold immunization in pregnant and breastfeeding women who meet criteria for receipt of the vaccine based on the ACIP recommended priority groups. All questions answered. Patient vocalized understanding.     Discussed s/sx that should prompt call to the office  Discussed kick sangeeta  RTC in 2 wks    Yudith PresMD yumiko

## 2023-01-10 ENCOUNTER — ROUTINE PRENATAL (OUTPATIENT)
Dept: OBGYN CLINIC | Age: 33
End: 2023-01-10
Payer: MEDICARE

## 2023-01-10 VITALS
HEART RATE: 85 BPM | BODY MASS INDEX: 32.22 KG/M2 | SYSTOLIC BLOOD PRESSURE: 113 MMHG | WEIGHT: 165 LBS | DIASTOLIC BLOOD PRESSURE: 77 MMHG

## 2023-01-10 DIAGNOSIS — Z3A.30 30 WEEKS GESTATION OF PREGNANCY: Primary | ICD-10-CM

## 2023-01-10 DIAGNOSIS — O09.93 SUPERVISION OF HIGH RISK PREGNANCY IN THIRD TRIMESTER: ICD-10-CM

## 2023-01-10 PROCEDURE — G8417 CALC BMI ABV UP PARAM F/U: HCPCS | Performed by: OBSTETRICS & GYNECOLOGY

## 2023-01-10 PROCEDURE — 1036F TOBACCO NON-USER: CPT | Performed by: OBSTETRICS & GYNECOLOGY

## 2023-01-10 PROCEDURE — G8427 DOCREV CUR MEDS BY ELIG CLIN: HCPCS | Performed by: OBSTETRICS & GYNECOLOGY

## 2023-01-10 PROCEDURE — G8482 FLU IMMUNIZE ORDER/ADMIN: HCPCS | Performed by: OBSTETRICS & GYNECOLOGY

## 2023-01-10 PROCEDURE — 99213 OFFICE O/P EST LOW 20 MIN: CPT | Performed by: OBSTETRICS & GYNECOLOGY

## 2023-01-10 RX ORDER — DIPHENHYDRAMINE HCL 25 MG
25 CAPSULE ORAL NIGHTLY PRN
Qty: 30 CAPSULE | Refills: 2 | Status: SHIPPED | OUTPATIENT
Start: 2023-01-10 | End: 2023-02-09

## 2023-01-23 PROBLEM — O24.414 INSULIN CONTROLLED GESTATIONAL DIABETES MELLITUS (GDM) IN THIRD TRIMESTER: Status: ACTIVE | Noted: 2023-01-23

## 2023-01-23 NOTE — PROGRESS NOTES
Cyn Mccord is a  @ 32w3d who presents for JODI visit. She denies LOF, VB or Ctxs.  + FM. She denies any complaints. She denies any fevers/chills, SOB, cough, sore throat, loss of taste/smell or sick contacts. Pt denies any HA, vision changes or RUQ pain. Interpretor used    O:  Vitals:    23 1037   BP: 121/80   Pulse: 92     Gen: NAD  Abd: soft, nontender, gravid  Ext:  no edema    NST: 130, mod variability, accels, no decels. Category 1 FHTs, reactive. BP: 121/80  Weight: 164 lb (74.4 kg)  Heart Rate: 92  Patient Position: Sitting  Fetal HR: rnst  Movement: Present    A/P:  Patient Active Problem List    Diagnosis Date Noted    Insulin controlled gestational diabetes mellitus (GDM) in third trimester 2023     Priority: Medium     10/10/10  12 NPH      Needs flu shot 10/19/2022     Priority: Medium     Given 10/19/22      Multigravida of advanced maternal age in first trimester 2022     Priority: Medium    Use of letrozole (Femara) 2022     Priority: Medium     Conception on femara      PCOS (polycystic ovarian syndrome) 2022    Medication exposure during first trimester of pregnancy 2021     Topamax      RLTCS 17 F APG  wt ? 2017    Rh+/RI/GBS unk 2017    Type 2 DM- Class B 2017     TSH: WNL, HbA1c: 5.6, Pr:Cr WNL, EKG: pending  NST/BPPs @ 32 wks  Growth q 3-4 wks  Novolog 10/10/10; NPH 16 u        H/O C/S x4 04/25/2017     x4         Discussed updated COVID precautions and policies. Reviewed updated visitor policy. Encouraged social distancing and appropriate hand washing/hygiene practices. Reviewed symptoms suspicious for COVID infection. Discussed that ACOG, SMFM, and the CDC recommend to not withold immunization in pregnant and breastfeeding women who meet criteria for receipt of the vaccine based on the ACIP recommended priority groups. All questions answered. Patient vocalized understanding.     Pt forgot BS logs, but fastings <90, but pt says her 2 hr PPs are often in 150s.   Discussed that likely Dr Bran Arreguin will increase her insulin  Discussed s/sx that should prompt call to the office  Discussed caleb rutherford  RTC in 1 wks    Terrence Hall MD

## 2023-01-24 ENCOUNTER — ROUTINE PRENATAL (OUTPATIENT)
Dept: OBGYN CLINIC | Age: 33
End: 2023-01-24
Payer: MEDICARE

## 2023-01-24 VITALS
DIASTOLIC BLOOD PRESSURE: 80 MMHG | SYSTOLIC BLOOD PRESSURE: 121 MMHG | BODY MASS INDEX: 32.03 KG/M2 | HEART RATE: 92 BPM | WEIGHT: 164 LBS

## 2023-01-24 DIAGNOSIS — O24.414 INSULIN CONTROLLED GESTATIONAL DIABETES MELLITUS (GDM) IN THIRD TRIMESTER: ICD-10-CM

## 2023-01-24 DIAGNOSIS — O09.93 SUPERVISION OF HIGH RISK PREGNANCY IN THIRD TRIMESTER: Primary | ICD-10-CM

## 2023-01-24 DIAGNOSIS — Z3A.32 32 WEEKS GESTATION OF PREGNANCY: ICD-10-CM

## 2023-01-24 PROCEDURE — 99212 OFFICE O/P EST SF 10 MIN: CPT | Performed by: OBSTETRICS & GYNECOLOGY

## 2023-01-24 PROCEDURE — 59025 FETAL NON-STRESS TEST: CPT | Performed by: OBSTETRICS & GYNECOLOGY

## 2023-01-29 NOTE — PROGRESS NOTES
Kellie Sanderson is a  @ 33w2d who presents for JODI visit. She denies LOF, VB or Ctxs.  + FM. She is having some pain at the bottom of her feet at the end of the day. She denies any fevers/chills, SOB, cough, sore throat, loss of taste/smell or sick contacts. Pt denies any HA, vision changes or RUQ pain. O:  Vitals:    23 0856   BP: 122/82   Pulse: 98     Gen: NAD  Abd: soft, nontender, gravid  Ext:  no edema    NST: 135, mod variability, accels, no decels. Category 1 FHTs, reactive. BP: 122/82  Weight: 166 lb (75.3 kg)  Heart Rate: 98  Patient Position: Sitting  Movement: Present    A/P:  Patient Active Problem List    Diagnosis Date Noted    Insulin controlled gestational diabetes mellitus (GDM) in third trimester 2023     Priority: Medium     10/10/10  12 NPH      Needs flu shot 10/19/2022     Priority: Medium     Given 10/19/22      Multigravida of advanced maternal age in first trimester 2022     Priority: Medium    Use of letrozole (Femara) 2022     Priority: Medium     Conception on femara      PCOS (polycystic ovarian syndrome) 2022    Medication exposure during first trimester of pregnancy 2021     Topamax      RLTCS 17 F APG  wt ? 2017    Rh+/RI/GBS unk 2017    Type 2 DM- Class B 2017     TSH: WNL, HbA1c: 5.6, Pr:Cr WNL, EKG: pending  NST/BPPs @ 32 wks  Growth q 3-4 wks  Novolog 10/10/10; NPH 16 u        H/O C/S x4 04/25/2017     x4         Discussed updated COVID precautions and policies. Reviewed updated visitor policy. Encouraged social distancing and appropriate hand washing/hygiene practices. Reviewed symptoms suspicious for COVID infection. Discussed that ACOG, SMFM, and the CDC recommend to not withold immunization in pregnant and breastfeeding women who meet criteria for receipt of the vaccine based on the ACIP recommended priority groups. All questions answered. Patient vocalized understanding.     Discussed s/sx that should prompt call to the office  Discussed kick counts  RTC in 1 wks    Luis Vasquez MD

## 2023-01-30 ENCOUNTER — ROUTINE PRENATAL (OUTPATIENT)
Dept: OBGYN CLINIC | Age: 33
End: 2023-01-30
Payer: MEDICARE

## 2023-01-30 VITALS
BODY MASS INDEX: 32.42 KG/M2 | DIASTOLIC BLOOD PRESSURE: 82 MMHG | SYSTOLIC BLOOD PRESSURE: 122 MMHG | WEIGHT: 166 LBS | HEART RATE: 98 BPM

## 2023-01-30 DIAGNOSIS — O24.414 INSULIN CONTROLLED GESTATIONAL DIABETES MELLITUS (GDM) IN THIRD TRIMESTER: ICD-10-CM

## 2023-01-30 DIAGNOSIS — Z3A.33 33 WEEKS GESTATION OF PREGNANCY: Primary | ICD-10-CM

## 2023-01-30 DIAGNOSIS — O09.93 SUPERVISION OF HIGH RISK PREGNANCY IN THIRD TRIMESTER: ICD-10-CM

## 2023-01-30 PROCEDURE — G8427 DOCREV CUR MEDS BY ELIG CLIN: HCPCS | Performed by: OBSTETRICS & GYNECOLOGY

## 2023-01-30 PROCEDURE — 1036F TOBACCO NON-USER: CPT | Performed by: OBSTETRICS & GYNECOLOGY

## 2023-01-30 PROCEDURE — 59025 FETAL NON-STRESS TEST: CPT | Performed by: OBSTETRICS & GYNECOLOGY

## 2023-01-30 PROCEDURE — G8482 FLU IMMUNIZE ORDER/ADMIN: HCPCS | Performed by: OBSTETRICS & GYNECOLOGY

## 2023-01-30 PROCEDURE — G8417 CALC BMI ABV UP PARAM F/U: HCPCS | Performed by: OBSTETRICS & GYNECOLOGY

## 2023-01-30 PROCEDURE — 99213 OFFICE O/P EST LOW 20 MIN: CPT | Performed by: OBSTETRICS & GYNECOLOGY

## 2023-02-04 RX ORDER — PEN NEEDLE, DIABETIC 31 G X1/4"
NEEDLE, DISPOSABLE MISCELLANEOUS
Qty: 100 EACH | OUTPATIENT
Start: 2023-02-04

## 2023-02-06 ENCOUNTER — ROUTINE PRENATAL (OUTPATIENT)
Dept: OBGYN CLINIC | Age: 33
End: 2023-02-06
Payer: MEDICAID

## 2023-02-06 ENCOUNTER — HOSPITAL ENCOUNTER (OUTPATIENT)
Age: 33
Setting detail: SPECIMEN
Discharge: HOME | End: 2023-02-06

## 2023-02-06 VITALS
HEART RATE: 93 BPM | SYSTOLIC BLOOD PRESSURE: 119 MMHG | BODY MASS INDEX: 33.51 KG/M2 | WEIGHT: 171.6 LBS | DIASTOLIC BLOOD PRESSURE: 84 MMHG

## 2023-02-06 DIAGNOSIS — Z3A.34 34 WEEKS GESTATION OF PREGNANCY: Primary | ICD-10-CM

## 2023-02-06 DIAGNOSIS — O24.119 PRE-EXISTING TYPE 2 DIABETES MELLITUS DURING PREGNANCY, ANTEPARTUM: ICD-10-CM

## 2023-02-06 DIAGNOSIS — Z98.891 HISTORY OF CESAREAN SECTION, LOW TRANSVERSE: ICD-10-CM

## 2023-02-06 DIAGNOSIS — O09.93 SUPERVISION OF HIGH RISK PREGNANCY IN THIRD TRIMESTER: ICD-10-CM

## 2023-02-06 DIAGNOSIS — Z3A.34 34 WEEKS GESTATION OF PREGNANCY: ICD-10-CM

## 2023-02-06 PROBLEM — O24.414 INSULIN CONTROLLED GESTATIONAL DIABETES MELLITUS (GDM) IN THIRD TRIMESTER: Status: RESOLVED | Noted: 2023-01-23 | Resolved: 2023-02-06

## 2023-02-06 LAB
ABSOLUTE EOS #: 0.08 K/UL (ref 0–0.44)
ABSOLUTE IMMATURE GRANULOCYTE: 0.1 K/UL (ref 0–0.3)
ABSOLUTE LYMPH #: 2.54 K/UL (ref 1.1–3.7)
ABSOLUTE MONO #: 0.71 K/UL (ref 0.1–1.2)
BASOPHILS # BLD: 0 % (ref 0–2)
BASOPHILS ABSOLUTE: 0.03 K/UL (ref 0–0.2)
BILIRUBIN URINE: NEGATIVE
CASTS UA: ABNORMAL /LPF (ref 0–8)
COLOR: YELLOW
EOSINOPHILS RELATIVE PERCENT: 1 % (ref 1–4)
EPITHELIAL CELLS UA: ABNORMAL /HPF (ref 0–5)
GLUCOSE UR STRIP.AUTO-MCNC: NEGATIVE MG/DL
HCT VFR BLD AUTO: 35.4 % (ref 36.3–47.1)
HGB BLD-MCNC: 11.2 G/DL (ref 11.9–15.1)
IMMATURE GRANULOCYTES: 1 %
KETONES UR STRIP.AUTO-MCNC: NEGATIVE MG/DL
LEUKOCYTE ESTERASE UR QL STRIP.AUTO: ABNORMAL
LYMPHOCYTES # BLD: 21 % (ref 24–43)
MCH RBC QN AUTO: 26.7 PG (ref 25.2–33.5)
MCHC RBC AUTO-ENTMCNC: 31.6 G/DL (ref 28.4–34.8)
MCV RBC AUTO: 84.5 FL (ref 82.6–102.9)
MONOCYTES # BLD: 6 % (ref 3–12)
NITRITE UR QL STRIP.AUTO: NEGATIVE
NRBC AUTOMATED: 0 PER 100 WBC
PDW BLD-RTO: 14.2 % (ref 11.8–14.4)
PLATELET # BLD AUTO: 223 K/UL (ref 138–453)
PMV BLD AUTO: 10.9 FL (ref 8.1–13.5)
PROT UR STRIP.AUTO-MCNC: 7.5 MG/DL (ref 5–8)
PROT UR STRIP.AUTO-MCNC: NEGATIVE MG/DL
RBC # BLD: 4.19 M/UL (ref 3.95–5.11)
RBC CLUMPS #/AREA URNS AUTO: ABNORMAL /HPF (ref 0–4)
SEG NEUTROPHILS: 71 % (ref 36–65)
SEGMENTED NEUTROPHILS ABSOLUTE COUNT: 8.93 K/UL (ref 1.5–8.1)
SPECIFIC GRAVITY UA: 1 (ref 1–1.03)
TURBIDITY: CLEAR
URINE HGB: ABNORMAL
UROBILINOGEN, URINE: NORMAL
WBC # BLD AUTO: 12.4 K/UL (ref 3.5–11.3)
WBC UA: ABNORMAL /HPF (ref 0–5)

## 2023-02-06 PROCEDURE — 99213 OFFICE O/P EST LOW 20 MIN: CPT | Performed by: STUDENT IN AN ORGANIZED HEALTH CARE EDUCATION/TRAINING PROGRAM

## 2023-02-06 PROCEDURE — 59025 FETAL NON-STRESS TEST: CPT | Performed by: STUDENT IN AN ORGANIZED HEALTH CARE EDUCATION/TRAINING PROGRAM

## 2023-02-06 NOTE — PROGRESS NOTES
Prenatal Visit    Pedro Bertrand is a 27 y.o. female [id number] at 34w2d      Patient seen with use of a . The patient was seen and evaluated. Reports positive fetal movements. She denies headache, vision changes, RUQ pain, contractions, vaginal bleeding and leakage of fluid. The patient already received the T-Dap Vaccine (27-36 weeks) this pregnancy. The patient already received the influenza vaccine this year. The problem list reflects the active issues addressed during today's visit    Vitals:    BP: 119/84  Weight: 171 lb 9.6 oz (77.8 kg)  Heart Rate: 93  Fetal HR: RNST  Movement: Present     NST:   Fetal heart rate baseline: 150, moderate variability, accelerations present, decelerations absent    The tracing has been reviewed and is considered reactive. 28 Week Labs: The patient is RH +, Rhogam not indicated  ABO/Rh   Date Value Ref Range Status   08/30/2022 A POSITIVE  Final       1hr GTT: Pregestational DM   28 week CBC:   Lab Results   Component Value Date    WBC 9.7 08/30/2022    HGB 10.4 (L) 08/30/2022    HCT 34.1 (L) 08/30/2022    MCV 84.8 08/30/2022     08/30/2022     UA w/ Ur C&S: ordered     Assessment & Plan:  Pedro Bertrand is a 27 y.o. female [id number] at 34w2d   - Discussed signs or symptoms of when to call office   - Discussed fetal movement parameters  - 28 week labs ordered   - discussed recommendations for TDAP immunization, patient already received TDAP. - Next [facility] appointment 2/7/23   - The ACIP recommended pregnant patients be included in phase 1C of vaccine distribution. This decision is supported by [facility] and ACOG. As of February 16, 2021, there have been over 30,000 pregnant patients included in the V-safe post COVID vaccination safety . Most (73%) reports to [facility] among pregnant women involved non-pregnancyspecific adverse events (e.g., local and systemic reactions).  Miscarriage was the most frequently reported pregnancy-specific adverse event to [facility]; numbers are within the known background rates based on presumed COVID-19 vaccine doses administered to pregnant women. No unexpected pregnancy or infant outcomes have been observed related to  COVID-19 vaccination during pregnancy. Recommended patient proceed with vaccination. - 10/10/10, 16U NPH managed by M[facility]   - Betty Repeat C/S with Dr. Anny Galloway. Epic message sent to schedule date. Patient Active Problem List    Diagnosis Date Noted    Needs flu shot 10/19/2022     Priority: Medium     Given 10/19/22      Multigravida of advanced maternal age in first trimester 08/30/2022     Priority: Medium    Use of letrozole (Femara) 08/30/2022     Conception on femara      PCOS (polycystic ovarian syndrome) 02/21/2022    Medication exposure during first trimester of pregnancy 02/09/2021     Topamax      RLTCS 11/9/17 F APG 8/9 wt ? 11/09/2017    Rh+/RI/GBS unk 08/23/2017    Type 2 DM- Class B 05/02/2017     TSH: WNL, HbA1c: 5.6, Pr:Cr WNL, EKG: pending  NST/BPPs @ 32 wks  Growth q 3-4 wks  Novolog 10/10/10; NPH 16 u        H/O C/S x4 04/25/2017     x4         Return in about 1 week (around 2/13/2023) for CHUY Cline, [address] [facility]   2/6/2023, 2:13 PM

## 2023-02-06 NOTE — Clinical Note
Bailey Fuller,  This is the patient I previously spoke to you about. 3/13/23 (Monday) she'll be 39w2d. But could absolutely be called uncontrolled!   Thanks,  Phill Harper

## 2023-02-06 NOTE — Clinical Note
Hospital: [facility] Date: 3/13/23 Time: 0800 Arrival Time: 0600  Repeat C/S. Hx C/S x 4.   Thanks,  DO Lilian Hilton Ob/G[facility]  2/6/2023, 10:49 AM

## 2023-02-07 ENCOUNTER — ROUTINE PRENATAL (OUTPATIENT)
Dept: PERINATAL CARE | Age: 33
End: 2023-02-07
Payer: MEDICAID

## 2023-02-07 VITALS
RESPIRATION RATE: 16 BRPM | TEMPERATURE: 97.7 F | HEIGHT: 60 IN | SYSTOLIC BLOOD PRESSURE: 122 MMHG | HEART RATE: 88 BPM | DIASTOLIC BLOOD PRESSURE: 74 MMHG | BODY MASS INDEX: 33.57 KG/M2 | WEIGHT: 171 LBS

## 2023-02-07 DIAGNOSIS — Z36.4 ULTRASOUND FOR ANTENATAL SCREENING FOR FETAL GROWTH RESTRICTION: ICD-10-CM

## 2023-02-07 DIAGNOSIS — Z13.89 ENCOUNTER FOR ROUTINE SCREENING FOR MALFORMATION USING ULTRASONICS: ICD-10-CM

## 2023-02-07 DIAGNOSIS — O28.5 ABNORMAL GENETIC TEST DURING PREGNANCY: ICD-10-CM

## 2023-02-07 DIAGNOSIS — O09.523 MULTIGRAVIDA OF ADVANCED MATERNAL AGE IN THIRD TRIMESTER: ICD-10-CM

## 2023-02-07 DIAGNOSIS — Z3A.34 34 WEEKS GESTATION OF PREGNANCY: ICD-10-CM

## 2023-02-07 DIAGNOSIS — O24.119 PRE-EXISTING TYPE 2 DIABETES MELLITUS DURING PREGNANCY, ANTEPARTUM: Primary | ICD-10-CM

## 2023-02-07 DIAGNOSIS — O99.213 OBESITY AFFECTING PREGNANCY IN THIRD TRIMESTER: ICD-10-CM

## 2023-02-07 LAB
ABDOMINAL CIRCUMFERENCE: NORMAL
BIPARIETAL DIAMETER: NORMAL
ESTIMATED FETAL WEIGHT: NORMAL
FEMORAL DIAMETER: NORMAL
HC/AC: NORMAL
HEAD CIRCUMFERENCE: NORMAL
MICROORGANISM SPEC CULT: NORMAL
SPECIMEN DESCRIPTION: NORMAL

## 2023-02-07 PROCEDURE — 76825 ECHO EXAM OF FETAL HEART: CPT | Performed by: OBSTETRICS & GYNECOLOGY

## 2023-02-07 PROCEDURE — 76827 ECHO EXAM OF FETAL HEART: CPT | Performed by: OBSTETRICS & GYNECOLOGY

## 2023-02-07 PROCEDURE — 76816 OB US FOLLOW-UP PER FETUS: CPT | Performed by: OBSTETRICS & GYNECOLOGY

## 2023-02-07 PROCEDURE — 93325 DOPPLER ECHO COLOR FLOW MAPG: CPT | Performed by: OBSTETRICS & GYNECOLOGY

## 2023-02-07 PROCEDURE — 76819 FETAL BIOPHYS PROFIL W/O NST: CPT | Performed by: OBSTETRICS & GYNECOLOGY

## 2023-02-07 RX ORDER — INSULIN ASPART 100 [IU]/ML
10 INJECTION, SOLUTION INTRAVENOUS; SUBCUTANEOUS
COMMUNITY

## 2023-02-07 NOTE — PROGRESS NOTES
Blood sugars reviewed (insulin regimen adjusted, as below). Insulin regimen adjusted (after glycemic blood sugar log review) to subcutaneous (SQ) Novolog 14 units before breakfast, 14 units before lunch and 16 units before dinner. Insulin regimen increased to: NPH Insulin subcutaneously 22 units before bedtime  (consistently elevated fasting blood sugars above 90's). Please continue to send blood glucose monitoring information to [facility] office so that insulin and/or dietary changes can be made if necessary weekly. Diabetic education/nutrition counseling advised. Start recommended ADA diet therapy for diabetes. Compliance with regular prenatal care and blood sugar monitoring strongly encouraged. Strongly advised patient to be compliant with blood sugar monitoring as previously advised to minimize the potential of adverse  outcomes (e.g. fetal demise/stillbirth, congenital birth/heart defects, polyhydramnios/oligohydramnios, fetal growth abnormalities, pregnancy loss, hypertensive disorders of pregnancy, indicated  delivery, etc.), potential maternal morbidities, etc.     I would advise continuation of daily oral baby aspirin 81 mg based on guidelines by the USPSTF/ACOG (for preeclampsia prevention for pregnant women at \"high-risk\"  for preeclampsia). Patient declined invasive prenatal diagnostic testing (including evaluating for fetal thalassemia status, fetal aneuploidy, fetal microdeletions, fetal single gene etiologies, fetal microarray, etc.). Please refer to completed maternal carrier testing results (with the Topaz test from [employer]). Please refer to non-invasive prenatal testing/NIPT results (with the Topaz Complete test from [employer]). Please refer to completed paternal carrier testing results (with the Topaz test from [employer]).

## 2023-02-07 NOTE — PROGRESS NOTES
Worked with pt. on blood sugar logs - dates and insulin doses.   Pt verb. understanding and took picture of last week's blood sugar \"to make sure what doctor needs\"

## 2023-02-14 ENCOUNTER — ROUTINE PRENATAL (OUTPATIENT)
Dept: OBGYN CLINIC | Age: 33
End: 2023-02-14
Payer: MEDICAID

## 2023-02-14 ENCOUNTER — HOSPITAL ENCOUNTER (OUTPATIENT)
Age: 33
Setting detail: SPECIMEN
Discharge: HOME | End: 2023-02-14

## 2023-02-14 VITALS
SYSTOLIC BLOOD PRESSURE: 119 MMHG | HEART RATE: 101 BPM | BODY MASS INDEX: 33.79 KG/M2 | WEIGHT: 173 LBS | DIASTOLIC BLOOD PRESSURE: 83 MMHG

## 2023-02-14 DIAGNOSIS — Z3A.35 35 WEEKS GESTATION OF PREGNANCY: ICD-10-CM

## 2023-02-14 DIAGNOSIS — N89.8 VAGINAL DISCHARGE: ICD-10-CM

## 2023-02-14 DIAGNOSIS — N89.8 VAGINAL IRRITATION: ICD-10-CM

## 2023-02-14 DIAGNOSIS — O09.93 HIGH-RISK PREGNANCY IN THIRD TRIMESTER: Primary | ICD-10-CM

## 2023-02-14 DIAGNOSIS — E11.69 TYPE 2 DIABETES MELLITUS WITH OTHER SPECIFIED COMPLICATION, UNSPECIFIED WHETHER LONG TERM INSULIN USE (HCC): ICD-10-CM

## 2023-02-14 PROCEDURE — 99213 OFFICE O/P EST LOW 20 MIN: CPT | Performed by: STUDENT IN AN ORGANIZED HEALTH CARE EDUCATION/TRAINING PROGRAM

## 2023-02-14 PROCEDURE — 59025 FETAL NON-STRESS TEST: CPT | Performed by: STUDENT IN AN ORGANIZED HEALTH CARE EDUCATION/TRAINING PROGRAM

## 2023-02-14 NOTE — Clinical Note
She wants her  to be done by Dr Alvin Wheeler. Can you let [facility]  know so we can get her scheduled. Thanks !

## 2023-02-14 NOTE — PROGRESS NOTES
Prenatal Visit    Tez Partida is a 27 y.o. female [id number] at 35w3d IUP    The patient was seen and evaluated.  used during appointment. Complaining of vaginal discharge and irritation. Reports labia are itchy. Started a few days ago. Denies any vaginal bleeding or leaking fluid. Feeling good fetal movement and denies any contractions. Has her glucose log with her and majority of her levels are elevated, even some in the 170s. She states she has been compliant with her insulin. She has not yet faxed this sheet to [facility] yet. The problem list reflects the active issues addressed during today's visit. Allergies: Allergies   Allergen Reactions    Latex Hives    Adhesive Tape      Latex     Vitals:  BP: 119/83  Weight: 173 lb (78.5 kg)  Heart Rate: (!) 101  Patient Position: Sitting  Fundal Height (cm): 35 cm  Fetal HR: 135  Movement: Present     Pelvic Exam:  Chaperone declined   Vulva: normal hair distribution, no genital lymphadenopathy, no genital warts, no obvious rash present   Urethral Meatus: within normal limits   Vagina: normal appearing mucosa, no prolapse, no vaginal bleeding, thick white discharge present, no abnormal lesions or lacerations   Cervix: cervix thick, os visibly closed          NST:   Fetal heart rate baseline:  135 , moderate variability, accelerations present, absent decelerations     The tracing has been reviewed and is considered reactive. Assessment & Plan:  Tez Partida is a 27 y.o. female [id number] at 35w3d IUP   - VSS   - GBS collected today    - Tdap vaccination: done    - Rhogam: not indicated    - Influenza vaccination: done    - S/p COVID-19 vaccination x1   - Hx CS x4, still needs to be scheduled, desires repeat section with Dr Benjamin Toribio , will send message    - Growth scan scheduled with [facility] on 3/6/23  Return in about 2 weeks (around 2/28/2023) for [facility].     Vaginal Discharge/Irritation    - Gc/C and vaginitis collected, will treat if anything is pos T2DM    - Patient will likely need to adjust insulin  - Recommend  sending weekly log to [facility] today to get new dose     COUNSELING:   - Warning signs reviewed and recommendations when to call or present to the hospital if she experiences signs or symptoms of  labor and pre-eclampsia were reviewed. - The patient was instructed on fetal kick counts. She was instructed that the baby should move at a minimum of ten times within one hour after a meal. The patient was instructed to lay down on her left side twenty minutes after eating and count movements for up to one hour with a target value of ten movements. She was instructed to notify the office if she did not make that target after two attempts or if after any attempt there was less than four movements. - The patient was counseled on the need to choose her pediatrician for her baby.       Patient Active Problem List    Diagnosis Date Noted    Type 2 diabetes mellitus ([facility] [address]      Priority: Medium    Needs flu shot 10/19/2022     Priority: Medium     Given 10/19/22      Multigravida of advanced maternal age in first trimester 2022     Priority: Medium    Use of letrozole (Femara) 2022     Conception on femara      PCOS (polycystic ovarian syndrome) 2022    Medication exposure during first trimester of pregnancy 2021     Topamax      RLTCS 17 F APG  wt ? 2017    Rh+/RI/GBS unk 2017    Type 2 DM- Class B 2017     TSH: WNL, HbA1c: 5.6, Pr:Cr WNL, EKG: pending  NST/BPPs @ 32 wks  Growth q 3-4 wks  Novolog 10/10/10; NPH 16 u        H/O C/S x4 04/25/2017     x4           DO Lilina Becerra OB/GYN  2023, 10:33 AM

## 2023-02-15 LAB
C TRACH DNA SPEC QL PROBE+SIG AMP: NEGATIVE
CANDIDA SPECIES, DNA PROBE: NEGATIVE
GARDNERELLA VAGINALIS, DNA PROBE: NEGATIVE
N GONORRHOEA DNA SPEC QL PROBE+SIG AMP: NEGATIVE
SOURCE: NORMAL
SPECIMEN DESCRIPTION: NORMAL
TRICHOMONAS VAGINALIS DNA: NEGATIVE

## 2023-02-17 LAB
MICROORGANISM SPEC CULT: NORMAL
SPECIMEN DESCRIPTION: NORMAL

## 2023-02-20 NOTE — PROGRESS NOTES
Graciela Montenegro is a [id number] @ 36w3d who presents for JODI visit. She denies LOF, VB or Ctxs.  + FM. She denies any complaints. She denies any fevers/chills, SOB, cough, sore throat, loss of taste/smell or sick contacts. Pt denies any HA, vision changes or RUQ pain. O:  Vitals:    02/21/23 0957   BP: 133/87   Pulse: 97     Gen: NAD  Abd: soft, nontender, gravid  Ext:  no edema    NST: 125, mod variability, accels, no decels. Category 1 FHTs, reactive. BP: 133/87  Heart Rate: 97  Patient Position: Sitting  Fetal HR: rnst  Movement: Present    A/P:  Patient Active Problem List    Diagnosis Date Noted    Type 2 diabetes mellitus ([facility] [address]      Priority: Medium    Needs flu shot 10/19/2022     Priority: Medium     Given 10/19/22      Multigravida of advanced maternal age in first trimester 08/30/2022     Priority: Medium    Use of letrozole (Femara) 08/30/2022     Priority: Medium     Conception on femara      PCOS (polycystic ovarian syndrome) 02/21/2022    Medication exposure during first trimester of pregnancy 02/09/2021     Topamax      RLTCS 11/9/17 F APG 8/9 wt ? 11/09/2017    Rh+/RI/GBS unk 08/23/2017    Type 2 DM- Class B 05/02/2017     TSH: WNL, HbA1c: 5.6, Pr:Cr WNL, EKG: pending  NST/BPPs @ 32 wks  Growth q 3-4 wks  Novolog 14/14/16; NPH  22 u    Uncontrolled - delivery at 38 wks        H/O C/S x4 04/25/2017     x4         Discussed updated COVID precautions and policies. Reviewed updated visitor policy. Encouraged social distancing and appropriate hand washing/hygiene practices. Reviewed symptoms suspicious for COVID infection. Discussed that ACOG, SMFM, and the CDC recommend to not withold immunization in pregnant and breastfeeding women who meet criteria for receipt of the vaccine based on the ACIP recommended priority groups. All questions answered. Patient vocalized understanding. GBS neg  MFM increased her NPH and Novolog again, so she is now considered uncontrolled with her insulin dosages.   Still having elevated postprandials especially after dinner.     Repeat c/s scheduled for 3/14  Discussed s/sx that should prompt call to the office  Discussed kick counts  RTC in 1 wks    Arturo Trujillo MD

## 2023-02-21 ENCOUNTER — ROUTINE PRENATAL (OUTPATIENT)
Dept: OBGYN CLINIC | Age: 33
End: 2023-02-21

## 2023-02-21 ENCOUNTER — TELEPHONE (OUTPATIENT)
Dept: PERINATAL CARE | Age: 33
End: 2023-02-21

## 2023-02-21 VITALS
HEART RATE: 97 BPM | SYSTOLIC BLOOD PRESSURE: 133 MMHG | DIASTOLIC BLOOD PRESSURE: 87 MMHG | BODY MASS INDEX: 33.4 KG/M2 | WEIGHT: 171 LBS

## 2023-02-21 DIAGNOSIS — O24.119 PRE-EXISTING TYPE 2 DIABETES MELLITUS DURING PREGNANCY, ANTEPARTUM: ICD-10-CM

## 2023-02-21 DIAGNOSIS — Z3A.36 36 WEEKS GESTATION OF PREGNANCY: Primary | ICD-10-CM

## 2023-02-21 DIAGNOSIS — O09.93 SUPERVISION OF HIGH RISK PREGNANCY IN THIRD TRIMESTER: ICD-10-CM

## 2023-02-21 NOTE — TELEPHONE ENCOUNTER
Pt sent blood sugars per fax on 2/14/2023 and Dr. Haydee Conrad reviewed. New insulin doses ordered. Writer tried to reach pt by phone 2x's and [employer] message sent without response by pt. Writer contacted pt's primary OB, who pt had appt. with today and asked to please have pt call office. No phone call from pt. Received blood sugar log for week of 2/12-2/18/2023. Dr. Haydee Conrad reviewed and instructed pt to adjust insulin as ordered on 2/14/2023. Writer contacted pt by phone, pt answered and new insulin doses given to pt. Pt verb. back without questions.   See blood sugar log in

## 2023-02-27 PROBLEM — O09.523 MULTIGRAVIDA OF ADVANCED MATERNAL AGE IN THIRD TRIMESTER: Status: ACTIVE | Noted: 2022-08-30

## 2023-02-28 ENCOUNTER — ROUTINE PRENATAL (OUTPATIENT)
Dept: OBGYN CLINIC | Age: 33
End: 2023-02-28

## 2023-02-28 VITALS — DIASTOLIC BLOOD PRESSURE: 85 MMHG | HEART RATE: 95 BPM | SYSTOLIC BLOOD PRESSURE: 129 MMHG

## 2023-02-28 DIAGNOSIS — O24.119 PRE-EXISTING TYPE 2 DIABETES MELLITUS DURING PREGNANCY, ANTEPARTUM: ICD-10-CM

## 2023-02-28 DIAGNOSIS — Z3A.37 37 WEEKS GESTATION OF PREGNANCY: Primary | ICD-10-CM

## 2023-02-28 NOTE — PROGRESS NOTES
Jyoti Lyons is a [id number] @ 37w3d who presents for JODI visit. She denies LOF, VB or Ctxs.  + FM. She denies any complaints. She denies any fevers/chills, SOB, cough, sore throat, loss of taste/smell or sick contacts. Pt denies any HA, vision changes or RUQ pain. O:  Vitals:    02/28/23 1003   BP: 129/85   Pulse: 95     Gen: NAD  Abd: soft, nontender, gravid  Ext:  mild edema    NST: 140, mod variability, accels, no decels. Category 1 FHTs, reactive  BP: 129/85  Heart Rate: 95  Patient Position: Sitting  Fetal HR: rnst  Movement: Present    A/P:  Patient Active Problem List    Diagnosis Date Noted    Type 2 diabetes mellitus ([facility] [address]      Priority: Medium    Needs flu shot 10/19/2022     Priority: Medium     Given 10/19/22      Multigravida of advanced maternal age in third trimester 08/30/2022     Priority: Medium    Use of letrozole (Femara) 08/30/2022     Priority: Medium     Conception on femara      PCOS (polycystic ovarian syndrome) 02/21/2022    Medication exposure during first trimester of pregnancy 02/09/2021     Topamax      RLTCS 11/9/17 F APG 8/9 wt ? 11/09/2017    Rh+/RI/GBS unk 08/23/2017    Type 2 DM- Class B 05/02/2017     TSH: WNL, HbA1c: 5.6, Pr:Cr WNL, EKG: pending  NST/BPPs @ 32 wks  Growth q 3-4 wks  Novolog 14/14/16; NPH  22 u    Uncontrolled - delivery at 38 wks        H/O C/S x4 04/25/2017     x4         Discussed updated COVID precautions and policies. Reviewed updated visitor policy. Encouraged social distancing and appropriate hand washing/hygiene practices. Reviewed symptoms suspicious for COVID infection. Discussed that ACOG, SMFM, and the CDC recommend to not withold immunization in pregnant and breastfeeding women who meet criteria for receipt of the vaccine based on the ACIP recommended priority groups. All questions answered. Patient vocalized understanding.     Will give soap and ERAS materials next week  Discussed s/sx that should prompt call to the office  Discussed kick sangeeta  RTC in 1 wks    Diony Aleman MD

## 2023-03-01 ENCOUNTER — TELEPHONE (OUTPATIENT)
Dept: PERINATAL CARE | Age: 33
End: 2023-03-01

## 2023-03-01 NOTE — TELEPHONE ENCOUNTER
Patient was advised by phone that Dr. Miriam Lennon has reviewed her blood sugar log 02/19/2023 - 02-25/2023, his insulin recommendations are Novolog 16 units before breakfast, 22 units before lunch, and 24 units before dinner, NPH insulin 26 units at bedtime. Patient understands Dr. Sidney Chin insulin recommendations.

## 2023-03-06 ENCOUNTER — TELEPHONE (OUTPATIENT)
Dept: PERINATAL CARE | Age: 33
End: 2023-03-06

## 2023-03-06 ENCOUNTER — ROUTINE PRENATAL (OUTPATIENT)
Dept: PERINATAL CARE | Age: 33
End: 2023-03-06
Payer: MEDICAID

## 2023-03-06 VITALS
WEIGHT: 174 LBS | RESPIRATION RATE: 16 BRPM | SYSTOLIC BLOOD PRESSURE: 128 MMHG | BODY MASS INDEX: 34.16 KG/M2 | DIASTOLIC BLOOD PRESSURE: 84 MMHG | HEART RATE: 92 BPM | HEIGHT: 60 IN | TEMPERATURE: 97.5 F

## 2023-03-06 DIAGNOSIS — Z36.4 ULTRASOUND FOR ANTENATAL SCREENING FOR FETAL GROWTH RESTRICTION: ICD-10-CM

## 2023-03-06 DIAGNOSIS — O24.119 PRE-EXISTING TYPE 2 DIABETES MELLITUS DURING PREGNANCY, ANTEPARTUM: Primary | ICD-10-CM

## 2023-03-06 DIAGNOSIS — O09.523 MULTIGRAVIDA OF ADVANCED MATERNAL AGE IN THIRD TRIMESTER: ICD-10-CM

## 2023-03-06 DIAGNOSIS — Z3A.38 38 WEEKS GESTATION OF PREGNANCY: ICD-10-CM

## 2023-03-06 DIAGNOSIS — O99.213 OBESITY AFFECTING PREGNANCY IN THIRD TRIMESTER: ICD-10-CM

## 2023-03-06 DIAGNOSIS — Z13.89 ENCOUNTER FOR ROUTINE SCREENING FOR MALFORMATION USING ULTRASONICS: ICD-10-CM

## 2023-03-06 PROCEDURE — 76819 FETAL BIOPHYS PROFIL W/O NST: CPT | Performed by: OBSTETRICS & GYNECOLOGY

## 2023-03-06 PROCEDURE — 76805 OB US >/= 14 WKS SNGL FETUS: CPT | Performed by: OBSTETRICS & GYNECOLOGY

## 2023-03-06 PROCEDURE — 99999 PR OFFICE/OUTPT VISIT,PROCEDURE ONLY: CPT | Performed by: OBSTETRICS & GYNECOLOGY

## 2023-03-06 RX ORDER — HUMAN INSULIN 100 [IU]/ML
INJECTION, SUSPENSION SUBCUTANEOUS
OUTPATIENT
Start: 2023-03-06

## 2023-03-06 NOTE — TELEPHONE ENCOUNTER
Pt here in office for visit and Dr. Kye Adams reviewed blood sugar log. Dr. Kye Adams made adjustments to Novolog & NPH insulin   Pt agreed and asked to have called into [facility], [place], [phone], personal friend Robert Abrams, Novolog insulin 20u before breakfast, 26u before lunch and 28u before dinner, with needles 8 day supply, no refills. NPH insulin, 32u at bedtime, with needles, 8 day supply and no refills.

## 2023-03-06 NOTE — PROGRESS NOTES
Blood sugars reviewed (insulin regimen adjusted, as below). Insulin regimen adjusted (after glycemic blood sugar log review) to subcutaneous (SQ) Novolog 20 units before breakfast, 26 units before lunch and 28 units before dinner. Insulin regimen increased to: NPH Insulin subcutaneously 32 units before bedtime  (consistently elevated fasting blood sugars above 90's). Please continue to send blood glucose monitoring information to [facility] office so that insulin and/or dietary changes can be made if necessary weekly. Diabetic education/nutrition counseling advised. Start recommended ADA diet therapy for diabetes. Compliance with regular prenatal care and blood sugar monitoring strongly encouraged.      Strongly advised patient to be compliant with blood sugar monitoring as previously advised to minimize the potential of adverse  outcomes (e.g. fetal demise/stillbirth, congenital birth/heart defects, polyhydramnios/oligohydramnios, fetal growth abnormalities, pregnancy loss, hypertensive disorders of pregnancy, indicated  delivery, etc.), potential maternal morbidities, etc.

## 2023-03-07 ENCOUNTER — ROUTINE PRENATAL (OUTPATIENT)
Dept: OBGYN CLINIC | Age: 33
End: 2023-03-07
Payer: MEDICAID

## 2023-03-07 VITALS
DIASTOLIC BLOOD PRESSURE: 81 MMHG | BODY MASS INDEX: 34.57 KG/M2 | SYSTOLIC BLOOD PRESSURE: 129 MMHG | WEIGHT: 177 LBS | HEART RATE: 97 BPM

## 2023-03-07 DIAGNOSIS — O24.119 PRE-EXISTING TYPE 2 DIABETES MELLITUS DURING PREGNANCY, ANTEPARTUM: ICD-10-CM

## 2023-03-07 PROCEDURE — 99212 OFFICE O/P EST SF 10 MIN: CPT | Performed by: OBSTETRICS & GYNECOLOGY

## 2023-03-13 PROBLEM — Z3A.39 39 WEEKS GESTATION OF PREGNANCY: Status: ACTIVE | Noted: 2023-03-13

## 2023-03-13 NOTE — DISCHARGE SUMMARY
Obstetric Discharge Summary  [facility]    Patient Name: Moiz Osorio  Patient : 1990  Primary Care Physician: Samuel Rios MD  Admit Date: 3/14/2023    Principal Diagnosis: IUP at 39w2d, admitted for repeat  section     Her pregnancy has been complicated by:   Patient Active Problem List   Diagnosis    H/O C/S x4    Type 2 DM- Class B    Rh+/RI/GBS unk    RLTCS 17 F APG 8/9 wt ? Medication exposure during first trimester of pregnancy    PCOS (polycystic ovarian syndrome)    Multigravida of advanced maternal age in third trimester    Use of letrozole (Femara)    Needs flu shot    Type 2 diabetes mellitus ([address]    Rh+/RI/GBSneg    Alpha thalassemia silent carrier    History of gestational hypertension    RLTCS 3/14/23 F Apg 8/9 Wt 7#4       Infection Present?: No  Hospital Acquired: n/a    Surgical Operations & Procedures:  Analgesia: spinal  Delivery Type:  Delivery: See Labor and Delivery Summary   Laceration(s): Absent    Consultations: NICU, and Anesthesia    Pertinent Findings & Procedures:   Moiz Osorio is a 26 y.o. female [id number] at 39w2d admitted for repeat  section; received Ancef/Bicitra/Tylenol/Pepcid/TXA preoperatively. She delivered by repeat low transverse  a Live Born infant on 3/14/23. Information for the patient's :  Brian Garcia [[id number]]   female   Birth Weight: 7 lb 4.9 oz (3.315 kg)     Apgars: 8 at 1 minute and 9 at 5 minutes. ERAS for  Section  Received ERAS education outpatient: Yes  Consumed electrolyte-rich clear fluid prior to surgery: No  Received pre-operative Tylenol and Pepcid: Yes  Received post-operative scheduled Motrin and Tylenol: Yes  Diggs catheter discontinued 12 hours post-operatively: Yes        ERAS requirements met (3/5): Yes    Postpartum course: normal.    POD#1: Hgb 9.8. Clinically asymptomatic. Iron sent to pharmacy on file.  Patient was restarted on Metformin 500mg XR nightly. Course of patient: complicated by pregestational DM    Discharge to: Home    Readmission planned: no     Recommendations on Discharge:     Medications:      Medication List        START taking these medications      ibuprofen 800 MG tablet  Commonly known as: ADVIL;MOTRIN  Take 1 tablet by mouth every 8 hours as needed for Pain     metFORMIN 500 MG extended release tablet  Commonly known as: Glucophage XR  Take 1 tablet by mouth at bedtime     oxyCODONE 5 MG immediate release tablet  Commonly known as: Roxicodone  Take 1 tablet by mouth every 6 hours as needed for Pain for up to 5 days. Intended supply: 3 days. Take lowest dose possible to manage pain Max Daily Amount: 20 mg     sennosides-docusate sodium 8.6-50 MG tablet  Commonly known as: SENOKOT-S  Take 1 tablet by mouth daily            CHANGE how you take these medications      * Slow Release Iron 50 MG Tbcr  Generic drug: Ferrous Sulfate ER  What changed: Another medication with the same name was added. Make sure you understand how and when to take each. * ferrous sulfate 325 (65 Fe) MG tablet  Commonly known as: IRON 325  Take 1 tablet by mouth 2 times daily  What changed: You were already taking a medication with the same name, and this prescription was added. Make sure you understand how and when to take each. * This list has 2 medication(s) that are the same as other medications prescribed for you. Read the directions carefully, and ask your doctor or other care provider to review them with you.                 CONTINUE taking these medications      cetirizine 10 MG tablet  Commonly known as: ZYRTEC     folic acid 075 MCG tablet  Commonly known as: FOLVITE     furosemide 20 MG tablet  Commonly known as: LASIX     hydrOXYzine HCl 25 MG tablet  Commonly known as: ATARAX     Magnesium Oxide 250 MG Tabs tablet  Commonly known as: MAGNESIUM-OXIDE     melatonin 3 MG Tabs tablet     OneTouch Delica Plus [id number] Misc     OneTouch Ultra strip  Generic drug: blood glucose test strips     Ultra-Care Alcohol Prep Pads 70 % Pads     vitamin B-12 1000 MCG tablet  Commonly known as: CYANOCOBALAMIN     Vitamin D3 50 MCG (2000 UT) Tabs     Vol-Ryan 28-1 MG Tabs            STOP taking these medications      Aspirin Low Dose 81 MG chewable tablet  Generic drug: aspirin     insulin  UNIT/ML injection vial  Commonly known as: HumuLIN N;NovoLIN N     NovoLOG FlexPen 100 UNIT/ML injection pen  Generic drug: insulin aspart            ASK your doctor about these medications      ClearLax 17 GM/SCOOP powder  Generic drug: polyethylene glycol     diclofenac sodium 1 % Gel  Commonly known as: VOLTAREN               Where to Get Your Medications        These medications were sent to [facility] [address] [address], [address]  [address]      Phone: [phone]   ferrous sulfate 325 (65 Fe) MG tablet  ibuprofen 800 MG tablet  metFORMIN 500 MG extended release tablet  oxyCODONE 5 MG immediate release tablet  sennosides-docusate sodium 8.6-50 MG tablet           Activity: pelvic rest x 6 weeks, no driving on narcotics, no lifting greater than 15 lbs  Diet: regular diet  Follow up: 1 week for Silver dressing removal    Condition on discharge: stable    Discharge date: 3/16/23    Syeda Mathew DO  Ob/Gyn Resident    Comments:  Home care and follow-up care were reviewed. Pelvic rest, and birth control were reviewed. Signs and symptoms of mastitis and post partum depression were reviewed. The patient is to notify her physician if any of these occur. The patient was counseled on secondary smoke risks and the increased risk of sudden infant death syndrome and respiratory problems to her baby with exposure. She was counseled on various alternate recommendations to decrease the exposure to secondary smoke to her children.

## 2023-03-13 NOTE — H&P
OBSTETRICAL HISTORY [facility]    Date: 3/14/2023       Time: 7:49 am   Patient Name: Jimi Jean     Patient : 1990  Room/Bed: [place] [address]    Admission Date/Time: 3/14/23      CC:  Section (Repeat)     HPI: Jimi Jean is a 26 y.o. [id number] at 39w2d who presents for repeat  section 2/2 pregestational DM. The patient reports fetal movement is present, denies contractions, denies loss of fluid, denies vaginal bleeding. Patient denies any headache, visual changes, difficulty breathing, RUQ pain, N/V, F/C, and pain/swelling in lower extremities. Denies any dysuria or vaginal discharge. DATING:  LMP: Patient's last menstrual period was 2022 (exact date). Estimated Date of Delivery: 3/18/23   Based on: early ultrasound, at 15 2/7 weeks GA    PREGNANCY RISK FACTORS:  Patient Active Problem List   Diagnosis    H/O C/S x4    Type 2 DM- Class B    Rh+/RI/GBS unk    RLTCS 17 F APG  wt ?     Medication exposure during first trimester of pregnancy    PCOS (polycystic ovarian syndrome)    Multigravida of advanced maternal age in third trimester    Use of letrozole (Femara)    Needs flu shot    Type 2 diabetes mellitus (HCC)    Rh+/RI/GBSneg    Alpha thalassemia silent carrier    History of gestational hypertension       REVIEW OF SYSTEMS:   Constitutional: negative fever, negative chills  HEENT: negative visual disturbances, negative headaches, negative dizziness  Breast: negative breast abnormalities, negative breast lumps, negative nipple discharge  Respiratory: negative dyspnea, negative cough, negative SOB  Cardiovascular: negative chest pain,  negative palpitations, negative arrhythmia, negative syncope   Gastrointestinal: negative abdominal pain, negative RUQ pain, negative N/V/D, negative constipation, negative bowel changes, negative heartburn   Genitourinary: negative pelvic pain, negative dysuria, negative hematuria, negative urinary incontinence, negative vaginal discharge  Dermatological: negative rash, negative pruritis  Hematologic: negative bruising  Immunologic/Lymphatic: negative recent illness, negative recent sick contact  Musculoskeletal: negative back pain, negative myalgias, negative arthralgias  Neurological:  negative migraines, negative seizures, negative weakness  Behavior/Psych: negative depression, negative anxiety, negative SI, negative HI    OBSTETRICAL HISTORY:   OB History    Para Term  AB Living   6 4 4 0 1 4   SAB IAB Ectopic Molar Multiple Live Births   1 0 0 0 0 4      # Outcome Date GA Lbr Salty/2nd Weight Sex Delivery Anes PTL Lv   6 Current            5 SAB  12w0d    SAB      4 Term 17 39w0d  7 lb 6.5 oz (3.36 kg) F CS-LTranv Spinal N RAFAL      Birth Comments: vanishing twin syndrome      Complications: Diabetes mellitus      Name: [address]: 8  Apgar5: 9   3 Term 2011    M CS-Unspec   RAFAL   2 Term 2009    M CS-Unspec   RAFAL   1 Term 2008    M CS-Unspec   RAFAL      Birth Comments: Syira      Complications: Cephalopelvic Disproportion       PAST MEDICAL HISTORY:   has a past medical history of Anemia, Gestational diabetes mellitus, Migraine, and Type 2 diabetes mellitus ([facility] [address] PAST SURGICAL HISTORY:   has a past surgical history that includes Tonsillectomy;  section;  section (N/A, 2017); and Carpal tunnel release (Bilateral). ALLERGIES:  is allergic to latex and adhesive tape. MEDICATIONS:  Prior to Admission medications    Medication Sig Start Date End Date Taking?  Authorizing Provider   insulin NPH (HUMULIN N;NOVOLIN N) 100 UNIT/ML injection vial Inject 26 Units into the skin nightly 3/7/23   Campos Reynolds MD   insulin aspart (NOVOLOG FLEXPEN) 100 UNIT/ML injection pen Inject 16 Units into the skin 3 times daily (before meals) 16u before breakfast, 22u before lunch and 24u before dinner 3/1/23   Historical Provider, MD Cholecalciferol (VITAMIN D3) 50 MCG (2000 UT) TABS TAKE ONE TABLET BY MOUTH EVERY DAY FOR VITAMIN D ????? ??? ???? ?? ???? ???? ?? ??? ?????? ??? ??????? ? 11/21/22   Historical Provider, MD   vitamin B-12 (CYANOCOBALAMIN) 1000 MCG tablet TAKE ONE TABLET BY MOUTH EVERY DAY FOR VITAMIN B12 ?????? ??? ???? ?? ??? ???????? ?? 12 11/21/22   Historical Provider, MD   cetirizine (ZYRTEC) 10 MG tablet TAKE ONE TABLET BY MOUTH EVERY DAY AT BEDTIME FOR ALLERGIES ?? ????? ?????? ?? ???? ?? ??? ?? ??? ????? ???????? 10/24/22   Historical Provider, MD   hydrOXYzine HCl (ATARAX) 25 MG tablet TAKE ONE TABLET BY MOUTH EVERY DAY AT BEDTIME FOR ITCHING ?? ????? ?????? ?? ???? ?? ??? ?? ??? ????? ????? 10/24/22   Historical Provider, MD   furosemide (LASIX) 20 MG tablet  10/24/22   Historical Provider, MD   Magnesium Oxide (MAGNESIUM-OXIDE) 250 MG TABS tablet TAKE ONE TABLET BY MOUTH EVERY DAY WITH A MEAL FOR MAGNESIUM ?? ????? ?????? ?? ??? ?? ???? ?? ?????????? 10/24/22   Historical Provider, MD   melatonin 3 MG TABS tablet TAKE ONE TABLET BY MOUTH EVERY DAY AT BEDTIME AS NEEDED FOR SLEEP ?? ????? ?????? ?? ???? ?? ??? ?? ??? ????? ??? ?????? ????? 10/24/22   Historical Provider, MD   diclofenac sodium (VOLTAREN) 1 % GEL  10/24/22   Historical Provider, MD   ASPIRIN LOW DOSE 81 MG chewable tablet CHEW ONE TABLET BY MOUTH EVERY DAY UNTIL DELIVERY OF BABY ???? ???? ???? ?? ??? ??? ????? ????? 10/24/22   Historical Provider, MD   SLOW RELEASE IRON 50 MG TBCR TAKE ONE TABLET BY MOUTH EVERY DAY FOR ANEMIA?? ????? ?????? ?? ??? ???? ???? 8/22/22   Historical Provider, MD   folic acid (FOLVITE) 092 MCG tablet TAKE 2 TABLETS BY MOUTH DAILY EVERY DAY FOR ANEMIA ????? ????? ?? ???? ???? ?????? ?? ??? ????? ??? ???? 8/31/22   Historical Provider, MD   ONETOUCH ULTRA strip  8/31/22   Historical Provider, MD   Lancets ([address]) [address] [facility]  8/31/22   Historical Provider, MD   Alcohol Swabs (ULTRA-CARE ALCOHOL PREP PADS) 70 % PADS  8/31/22   Historical Provider, MD   CLEARLAX 17 GM/SCOOP powder MIX 17GRAMS (1 CAPFUL) IN 8 OUNCES OF JUICE OR WATER AND DRINK BY MOUTH DAILY FOR CONSTIPATION ? ??? 17 ??????  ?? 8 ?????? ?? ?????? ?? ????  Patient not taking: No sig reported 8/22/22   Historical Provider, MD   Prenatal Vit-Fe Fumarate-FA (VOL-NIA) 28-1 MG TABS  3/14/17   Historical Provider, MD       FAMILY HISTORY:  family history includes Bone Cancer in her father; Cancer in her maternal grandmother and paternal grandfather; Diabetes type 2  in her mother; High Blood Pressure in her mother; No Known Problems in her brother, paternal grandmother, and sister. SOCIAL HISTORY:   reports that she has never smoked. She has never used smokeless tobacco. She reports that she does not drink alcohol and does not use drugs. VITALS:  Blood pressure 126/85, pulse 91, temperature 98.4 °F (36.9 °C), temperature source Oral, resp. rate 16, last menstrual period 06/11/2022, not currently breastfeeding.      PHYSICAL EXAM:  Fetal Heart Monitor:  Baseline Heart Rate 130, moderate variability, present accelerations, absent decelerations  [place]: contractions, regular every 3-5 minutes    General appearance:  no apparent distress, alert and cooperative  HEENT: head atraumatic, normocephalic, trachea midline, moist mucous membranes   Neurologic:  oriented, normal speech, no focal findings or movement disorder noted  Lungs:  no increased work of breathing, normal chest wall rise bilaterally  Heart:  regular rate   Abdomen:  soft, gravid, non-tender on palpation, no right upper quadrant tenderness, no CVA tenderness bilaterally, uterus non-tender, no signs of abruption and no signs of chorioamnionitis  Extremities:  no calf tenderness bilaterally, non-edematous bilaterally  Musculoskeletal: no gross abnormalities, range of motion appropriate for age   Psychiatric: mood appropriate, normal affect     LIMITED BEDSIDE US:  Position: Cephalic  Placental Location: right lateral   Fetal Heart Tones: Present  Fetal Movement: Present  Estimated Fetal Weight:  7 lbs 6oz on 3/6/23    PRENATAL LAB RESULTS:   Blood Type/Rh: A pos  Antibody Screen: negative  Hemoglobin, Hematocrit, Platelets: 28.5 / 77.6 / 294  Rubella: immune  T. Pallidum, IgG: negative   Hep C: non-reactive   Hepatitis B Surface Antigen: non-reactive   HIV: non-reactive   Gonorrhea: negative  Chlamydia: negative  Urine culture: negative, date: 22, 23    Hemoglobin A1C: 5.6    Group B Strep: negative  Cystic Fibrosis Screen: not available  Sickle Cell Screen: not available  First Trimester Screen: not done  MSAFP/Multiple Markers: negative  Non-Invasive Prenatal Testing: low risk for aneuploidy   Anatomy US: posterior, 3, female, normal anatomy     ASSESSMENT & PLAN:  Jimi Jean is a 26 y.o. female [id number] at 39w2d IUP   - GBS negative / Rh positive / R immune   - No indication for GBS prophylaxis   - VSS     Repeat  Section  Pregestational DM   - Admit to labor and delivery under the service of Dr. Mague Toribio   - VSS    - cEFM and TOCO   - Cat 1 FHT and TOCO showing contractions every 3-5 minutes   - CBC, T&S, T.Pal ordered   - UDS ordered.  R/B/A discussed with patient and patient agreeable   - IVF: 125 ml/hr NS    - BSUS: cephalic, EFW 7#6 on 3/6   - C/S consent in chart    - Ancef ordered   - Anesthesia and NICU aware    - Continue expectant management     Pregestational DM    - Pt follows with MFM    - Novolog , NPH 32u qHS    - Fetal echo wnl on 23   - Pt controlled on Metformin  mg  qHS prior to becoming pregnant    - Will continue to monitor closely     Hx C/s x4    - Pt presents for repeat cesaarean section     PCOS    - clinically asymptomatic upon dmission     Fetal exposure to Letrozole    - fetal echo wnl on 23    BMI 34      Patient Active Problem List    Diagnosis Date Noted    Alpha thalassemia silent carrier 2023     Priority: Medium    History of gestational hypertension 2023     Priority: Medium    Rh+/RI/GBSneg 2023     Priority: Medium    Type 2 diabetes mellitus ([address]      Priority: Medium    Needs flu shot 10/19/2022     Priority: Medium     Given 10/19/22      Multigravida of advanced maternal age in third trimester 2022     Priority: Medium    Use of letrozole (Femara) 2022     Conception on femara      PCOS (polycystic ovarian syndrome) 2022    Medication exposure during first trimester of pregnancy 2021     Topamax      RLTCS 17 F APG  wt ? 2017    Rh+/RI/GBS unk 2017    Type 2 DM- Class B 2017     TSH: WNL, HbA1c: 5.6, Pr:Cr WNL, EKG: pending  NST/BPPs @ 32 wks  Growth q 3-4 wks  Novolog ; NPH  32 u    Uncontrolled - delivery at 38 wks        H/O C/S x4 04/25/2017     x4               Risks, benefits, alternatives and possible complications have been discussed in detail with the patient. Admission, and post admission procedures and expectations were discussed in detail. All questions were answered.     Patient's primary ob/gyn provider: Dr. Josias Bar     Steroids Given In This Pregnancy: no  Steroids given this admission:  zenaida Levy MD  Ob/Gyn Resident  3/14/23, 7:49 am

## 2023-03-14 ENCOUNTER — ANESTHESIA (OUTPATIENT)
Dept: LABOR AND DELIVERY | Age: 33
End: 2023-03-14
Payer: MEDICAID

## 2023-03-14 ENCOUNTER — HOSPITAL ENCOUNTER (INPATIENT)
Age: 33
LOS: 2 days | Discharge: HOME | End: 2023-03-16
Attending: OBSTETRICS & GYNECOLOGY | Admitting: OBSTETRICS & GYNECOLOGY
Payer: MEDICAID

## 2023-03-14 ENCOUNTER — ANESTHESIA EVENT (OUTPATIENT)
Dept: LABOR AND DELIVERY | Age: 33
End: 2023-03-14
Payer: MEDICAID

## 2023-03-14 DIAGNOSIS — G89.18 POSTOPERATIVE PAIN: Primary | ICD-10-CM

## 2023-03-14 PROBLEM — D56.3 ALPHA THALASSEMIA SILENT CARRIER: Status: ACTIVE | Noted: 2023-03-14

## 2023-03-14 PROBLEM — Z98.891 H/O CESAREAN SECTION: Status: ACTIVE | Noted: 2023-03-14

## 2023-03-14 PROBLEM — Z87.59 HISTORY OF GESTATIONAL HYPERTENSION: Status: ACTIVE | Noted: 2023-03-14

## 2023-03-14 LAB
ABO/RH: NORMAL
ALBUMIN SERPL-MCNC: 3.8 G/DL (ref 3.5–5.2)
ALBUMIN/GLOBULIN RATIO: 1.4 (ref 1–2.5)
ALP SERPL-CCNC: 110 U/L (ref 35–104)
ALT SERPL-CCNC: 7 U/L (ref 5–33)
AMPHETAMINE SCREEN URINE: NEGATIVE
ANION GAP SERPL CALCULATED.3IONS-SCNC: 13 MMOL/L (ref 9–17)
ANTIBODY SCREEN: NEGATIVE
ARM BAND NUMBER: NORMAL
AST SERPL-CCNC: 12 U/L
BARBITURATE SCREEN URINE: NEGATIVE
BENZODIAZEPINE SCREEN, URINE: NEGATIVE
BILIRUB SERPL-MCNC: <0.1 MG/DL (ref 0.3–1.2)
BUN SERPL-MCNC: 9 MG/DL (ref 6–20)
CALCIUM SERPL-MCNC: 9.7 MG/DL (ref 8.6–10.4)
CANNABINOID SCREEN URINE: NEGATIVE
CHLORIDE SERPL-SCNC: 103 MMOL/L (ref 98–107)
CO2 SERPL-SCNC: 19 MMOL/L (ref 20–31)
COCAINE METABOLITE, URINE: NEGATIVE
CREAT SERPL-MCNC: 0.37 MG/DL (ref 0.5–0.9)
EXPIRATION DATE: NORMAL
FENTANYL URINE: NEGATIVE
GFR SERPL CREATININE-BSD FRML MDRD: >60 ML/MIN/1.73M2
GLUCOSE BLD-MCNC: 59 MG/DL (ref 65–105)
GLUCOSE BLD-MCNC: 64 MG/DL (ref 65–105)
GLUCOSE BLD-MCNC: 71 MG/DL (ref 65–105)
GLUCOSE BLD-MCNC: 83 MG/DL (ref 65–105)
GLUCOSE BLD-MCNC: 99 MG/DL (ref 65–105)
GLUCOSE SERPL-MCNC: 81 MG/DL (ref 70–99)
HCT VFR BLD AUTO: 34.2 % (ref 36.3–47.1)
HGB BLD-MCNC: 11.3 G/DL (ref 11.9–15.1)
MCH RBC QN AUTO: 27.2 PG (ref 25.2–33.5)
MCHC RBC AUTO-ENTMCNC: 33 G/DL (ref 28.4–34.8)
MCV RBC AUTO: 82.2 FL (ref 82.6–102.9)
METHADONE SCREEN, URINE: NEGATIVE
NRBC AUTOMATED: 0 PER 100 WBC
OPIATES, URINE: NEGATIVE
OXYCODONE SCREEN URINE: NEGATIVE
PDW BLD-RTO: 15.1 % (ref 11.8–14.4)
PHENCYCLIDINE, URINE: NEGATIVE
PLATELET # BLD AUTO: 227 K/UL (ref 138–453)
PMV BLD AUTO: 10.3 FL (ref 8.1–13.5)
POTASSIUM SERPL-SCNC: 3.9 MMOL/L (ref 3.7–5.3)
PROT SERPL-MCNC: 6.6 G/DL (ref 6.4–8.3)
RBC # BLD: 4.16 M/UL (ref 3.95–5.11)
SODIUM SERPL-SCNC: 135 MMOL/L (ref 135–144)
T PALLIDUM AB SER QL IA: NONREACTIVE
TEST INFORMATION: NORMAL
WBC # BLD AUTO: 15.4 K/UL (ref 3.5–11.3)

## 2023-03-14 PROCEDURE — 2709999900 HC NON-CHARGEABLE SUPPLY: Performed by: OBSTETRICS & GYNECOLOGY

## 2023-03-14 PROCEDURE — 2580000003 HC RX 258

## 2023-03-14 PROCEDURE — 6360000002 HC RX W HCPCS: Performed by: STUDENT IN AN ORGANIZED HEALTH CARE EDUCATION/TRAINING PROGRAM

## 2023-03-14 PROCEDURE — 2500000003 HC RX 250 WO HCPCS: Performed by: STUDENT IN AN ORGANIZED HEALTH CARE EDUCATION/TRAINING PROGRAM

## 2023-03-14 PROCEDURE — 3700000001 HC ADD 15 MINUTES (ANESTHESIA): Performed by: OBSTETRICS & GYNECOLOGY

## 2023-03-14 PROCEDURE — 86900 BLOOD TYPING SEROLOGIC ABO: CPT

## 2023-03-14 PROCEDURE — 3700000000 HC ANESTHESIA ATTENDED CARE: Performed by: OBSTETRICS & GYNECOLOGY

## 2023-03-14 PROCEDURE — 2500000003 HC RX 250 WO HCPCS

## 2023-03-14 PROCEDURE — 6370000000 HC RX 637 (ALT 250 FOR IP)

## 2023-03-14 PROCEDURE — 6370000000 HC RX 637 (ALT 250 FOR IP): Performed by: STUDENT IN AN ORGANIZED HEALTH CARE EDUCATION/TRAINING PROGRAM

## 2023-03-14 PROCEDURE — 2580000003 HC RX 258: Performed by: STUDENT IN AN ORGANIZED HEALTH CARE EDUCATION/TRAINING PROGRAM

## 2023-03-14 PROCEDURE — 59514 CESAREAN DELIVERY ONLY: CPT | Performed by: OBSTETRICS & GYNECOLOGY

## 2023-03-14 PROCEDURE — 1220000000 HC SEMI PRIVATE OB R&B

## 2023-03-14 PROCEDURE — 6360000002 HC RX W HCPCS

## 2023-03-14 PROCEDURE — 80053 COMPREHEN METABOLIC PANEL: CPT

## 2023-03-14 PROCEDURE — 86850 RBC ANTIBODY SCREEN: CPT

## 2023-03-14 PROCEDURE — 85027 COMPLETE CBC AUTOMATED: CPT

## 2023-03-14 PROCEDURE — 7100000000 HC PACU RECOVERY - FIRST 15 MIN: Performed by: OBSTETRICS & GYNECOLOGY

## 2023-03-14 PROCEDURE — 80307 DRUG TEST PRSMV CHEM ANLYZR: CPT

## 2023-03-14 PROCEDURE — 82947 ASSAY GLUCOSE BLOOD QUANT: CPT

## 2023-03-14 PROCEDURE — 3609079900 HC CESAREAN SECTION: Performed by: OBSTETRICS & GYNECOLOGY

## 2023-03-14 PROCEDURE — 86901 BLOOD TYPING SEROLOGIC RH(D): CPT

## 2023-03-14 PROCEDURE — 7100000001 HC PACU RECOVERY - ADDTL 15 MIN: Performed by: OBSTETRICS & GYNECOLOGY

## 2023-03-14 PROCEDURE — 86780 TREPONEMA PALLIDUM: CPT

## 2023-03-14 RX ORDER — IBUPROFEN 800 MG/1
800 TABLET ORAL EVERY 8 HOURS PRN
Qty: 60 TABLET | Refills: 1 | Status: SHIPPED | OUTPATIENT
Start: 2023-03-14

## 2023-03-14 RX ORDER — NALOXONE HYDROCHLORIDE 0.4 MG/ML
0.4 INJECTION, SOLUTION INTRAMUSCULAR; INTRAVENOUS; SUBCUTANEOUS PRN
Status: DISCONTINUED | OUTPATIENT
Start: 2023-03-14 | End: 2023-03-16 | Stop reason: HOSPADM

## 2023-03-14 RX ORDER — BISACODYL 10 MG
10 SUPPOSITORY, RECTAL RECTAL DAILY PRN
Status: DISCONTINUED | OUTPATIENT
Start: 2023-03-14 | End: 2023-03-16 | Stop reason: HOSPADM

## 2023-03-14 RX ORDER — FAMOTIDINE 20 MG/1
20 TABLET, FILM COATED ORAL 2 TIMES DAILY PRN
Status: DISCONTINUED | OUTPATIENT
Start: 2023-03-14 | End: 2023-03-16 | Stop reason: HOSPADM

## 2023-03-14 RX ORDER — IBUPROFEN 600 MG/1
600 TABLET ORAL EVERY 6 HOURS
Status: DISCONTINUED | OUTPATIENT
Start: 2023-03-14 | End: 2023-03-16 | Stop reason: HOSPADM

## 2023-03-14 RX ORDER — BUPIVACAINE HYDROCHLORIDE 7.5 MG/ML
INJECTION, SOLUTION INTRASPINAL PRN
Status: DISCONTINUED | OUTPATIENT
Start: 2023-03-14 | End: 2023-03-14 | Stop reason: SDUPTHER

## 2023-03-14 RX ORDER — ONDANSETRON 2 MG/ML
4 INJECTION INTRAMUSCULAR; INTRAVENOUS EVERY 6 HOURS PRN
Status: DISCONTINUED | OUTPATIENT
Start: 2023-03-14 | End: 2023-03-16 | Stop reason: HOSPADM

## 2023-03-14 RX ORDER — MORPHINE SULFATE 1 MG/ML
INJECTION, SOLUTION EPIDURAL; INTRATHECAL; INTRAVENOUS PRN
Status: DISCONTINUED | OUTPATIENT
Start: 2023-03-14 | End: 2023-03-14

## 2023-03-14 RX ORDER — SODIUM CHLORIDE, SODIUM LACTATE, POTASSIUM CHLORIDE, CALCIUM CHLORIDE 600; 310; 30; 20 MG/100ML; MG/100ML; MG/100ML; MG/100ML
INJECTION, SOLUTION INTRAVENOUS CONTINUOUS PRN
Status: DISCONTINUED | OUTPATIENT
Start: 2023-03-14 | End: 2023-03-14 | Stop reason: SDUPTHER

## 2023-03-14 RX ORDER — SODIUM CHLORIDE 0.9 % (FLUSH) 0.9 %
10 SYRINGE (ML) INJECTION PRN
Status: DISCONTINUED | OUTPATIENT
Start: 2023-03-14 | End: 2023-03-14

## 2023-03-14 RX ORDER — DEXTROSE MONOHYDRATE 100 MG/ML
INJECTION, SOLUTION INTRAVENOUS CONTINUOUS PRN
Status: DISCONTINUED | OUTPATIENT
Start: 2023-03-14 | End: 2023-03-16 | Stop reason: HOSPADM

## 2023-03-14 RX ORDER — SODIUM CHLORIDE 9 MG/ML
INJECTION, SOLUTION INTRAVENOUS PRN
Status: DISCONTINUED | OUTPATIENT
Start: 2023-03-14 | End: 2023-03-14

## 2023-03-14 RX ORDER — IBUPROFEN 600 MG/1
600 TABLET ORAL EVERY 8 HOURS SCHEDULED
Status: DISCONTINUED | OUTPATIENT
Start: 2023-03-14 | End: 2023-03-14

## 2023-03-14 RX ORDER — INSULIN LISPRO 100 [IU]/ML
0-4 INJECTION, SOLUTION INTRAVENOUS; SUBCUTANEOUS NIGHTLY
Status: DISCONTINUED | OUTPATIENT
Start: 2023-03-14 | End: 2023-03-15

## 2023-03-14 RX ORDER — LANOLIN 72 %
OINTMENT (GRAM) TOPICAL
Status: DISCONTINUED | OUTPATIENT
Start: 2023-03-14 | End: 2023-03-16 | Stop reason: HOSPADM

## 2023-03-14 RX ORDER — GLUCAGON 1 MG/ML
1 KIT INJECTION PRN
Status: DISCONTINUED | OUTPATIENT
Start: 2023-03-14 | End: 2023-03-16 | Stop reason: HOSPADM

## 2023-03-14 RX ORDER — SODIUM CHLORIDE 9 MG/ML
INJECTION, SOLUTION INTRAVENOUS CONTINUOUS
Status: DISCONTINUED | OUTPATIENT
Start: 2023-03-14 | End: 2023-03-14

## 2023-03-14 RX ORDER — SIMETHICONE 80 MG
80 TABLET,CHEWABLE ORAL EVERY 6 HOURS PRN
Status: DISCONTINUED | OUTPATIENT
Start: 2023-03-14 | End: 2023-03-16 | Stop reason: HOSPADM

## 2023-03-14 RX ORDER — ACETAMINOPHEN 500 MG
1000 TABLET ORAL EVERY 6 HOURS
Status: DISCONTINUED | OUTPATIENT
Start: 2023-03-14 | End: 2023-03-16 | Stop reason: HOSPADM

## 2023-03-14 RX ORDER — ONDANSETRON 2 MG/ML
INJECTION INTRAMUSCULAR; INTRAVENOUS PRN
Status: DISCONTINUED | OUTPATIENT
Start: 2023-03-14 | End: 2023-03-14 | Stop reason: SDUPTHER

## 2023-03-14 RX ORDER — TRISODIUM CITRATE DIHYDRATE AND CITRIC ACID MONOHYDRATE 500; 334 MG/5ML; MG/5ML
30 SOLUTION ORAL ONCE
Status: COMPLETED | OUTPATIENT
Start: 2023-03-14 | End: 2023-03-14

## 2023-03-14 RX ORDER — TRANEXAMIC ACID 10 MG/ML
1000 INJECTION, SOLUTION INTRAVENOUS ONCE
Status: COMPLETED | OUTPATIENT
Start: 2023-03-14 | End: 2023-03-14

## 2023-03-14 RX ORDER — DIPHENHYDRAMINE HYDROCHLORIDE 50 MG/ML
25 INJECTION INTRAMUSCULAR; INTRAVENOUS EVERY 6 HOURS PRN
Status: DISCONTINUED | OUTPATIENT
Start: 2023-03-14 | End: 2023-03-15

## 2023-03-14 RX ORDER — ACETAMINOPHEN 325 MG/1
975 TABLET ORAL ONCE
Status: COMPLETED | OUTPATIENT
Start: 2023-03-14 | End: 2023-03-14

## 2023-03-14 RX ORDER — SODIUM CHLORIDE 9 MG/ML
INJECTION, SOLUTION INTRAVENOUS PRN
Status: DISCONTINUED | OUTPATIENT
Start: 2023-03-14 | End: 2023-03-16 | Stop reason: HOSPADM

## 2023-03-14 RX ORDER — POLYETHYLENE GLYCOL 3350 17 G/17G
17 POWDER, FOR SOLUTION ORAL DAILY
Status: DISCONTINUED | OUTPATIENT
Start: 2023-03-14 | End: 2023-03-16 | Stop reason: HOSPADM

## 2023-03-14 RX ORDER — METFORMIN HYDROCHLORIDE 500 MG/1
500 TABLET, EXTENDED RELEASE ORAL
Qty: 30 TABLET | Refills: 3 | Status: SHIPPED | OUTPATIENT
Start: 2023-03-14 | End: 2023-03-15 | Stop reason: SDUPTHER

## 2023-03-14 RX ORDER — SODIUM CHLORIDE, SODIUM LACTATE, POTASSIUM CHLORIDE, AND CALCIUM CHLORIDE .6; .31; .03; .02 G/100ML; G/100ML; G/100ML; G/100ML
1000 INJECTION, SOLUTION INTRAVENOUS ONCE
Status: DISCONTINUED | OUTPATIENT
Start: 2023-03-14 | End: 2023-03-14

## 2023-03-14 RX ORDER — INSULIN LISPRO 100 [IU]/ML
0-8 INJECTION, SOLUTION INTRAVENOUS; SUBCUTANEOUS
Status: DISCONTINUED | OUTPATIENT
Start: 2023-03-14 | End: 2023-03-15

## 2023-03-14 RX ORDER — KETOROLAC TROMETHAMINE 30 MG/ML
INJECTION, SOLUTION INTRAMUSCULAR; INTRAVENOUS
Status: COMPLETED
Start: 2023-03-14 | End: 2023-03-14

## 2023-03-14 RX ORDER — METFORMIN HYDROCHLORIDE 500 MG/1
500 TABLET, EXTENDED RELEASE ORAL EVERY EVENING
Status: DISCONTINUED | OUTPATIENT
Start: 2023-03-14 | End: 2023-03-16 | Stop reason: HOSPADM

## 2023-03-14 RX ORDER — OXYCODONE HYDROCHLORIDE 5 MG/1
5 TABLET ORAL EVERY 6 HOURS PRN
Qty: 18 TABLET | Refills: 0 | Status: SHIPPED | OUTPATIENT
Start: 2023-03-14 | End: 2023-03-19

## 2023-03-14 RX ORDER — OXYCODONE HYDROCHLORIDE 5 MG/1
5 TABLET ORAL EVERY 4 HOURS PRN
Status: DISCONTINUED | OUTPATIENT
Start: 2023-03-14 | End: 2023-03-16 | Stop reason: HOSPADM

## 2023-03-14 RX ORDER — OXYCODONE HYDROCHLORIDE 5 MG/1
10 TABLET ORAL EVERY 4 HOURS PRN
Status: DISCONTINUED | OUTPATIENT
Start: 2023-03-14 | End: 2023-03-16 | Stop reason: HOSPADM

## 2023-03-14 RX ORDER — SODIUM CHLORIDE 0.9 % (FLUSH) 0.9 %
5-40 SYRINGE (ML) INJECTION PRN
Status: DISCONTINUED | OUTPATIENT
Start: 2023-03-14 | End: 2023-03-16 | Stop reason: HOSPADM

## 2023-03-14 RX ORDER — DOCUSATE SODIUM 100 MG/1
100 CAPSULE, LIQUID FILLED ORAL 2 TIMES DAILY
Status: DISCONTINUED | OUTPATIENT
Start: 2023-03-14 | End: 2023-03-16 | Stop reason: HOSPADM

## 2023-03-14 RX ORDER — VITAMIN A, ASCORBIC ACID, CHOLECALCIFEROL, .ALPHA.-TOCOPHEROL ACETATE, DL-, THIAMINE MONONITRATE, RIBOFLAVIN, NIACINAMIDE, PYRIDOXINE HYDROCHLORIDE, FOLIC ACID, CYANOCOBALAMIN, CALCIUM CARBONATE, IRON, ZINC OXIDE, AND CUPRIC OXIDE 4000; 120; 400; 22; 1.84; 3; 20; 10; 1; 12; 200; 29; 25; 2 [IU]/1; MG/1; [IU]/1; [IU]/1; MG/1; MG/1; MG/1; MG/1; MG/1; UG/1; MG/1; MG/1; MG/1; MG/1
1 TABLET ORAL DAILY
Status: DISCONTINUED | OUTPATIENT
Start: 2023-03-14 | End: 2023-03-16 | Stop reason: HOSPADM

## 2023-03-14 RX ORDER — KETOROLAC TROMETHAMINE 30 MG/ML
30 INJECTION, SOLUTION INTRAMUSCULAR; INTRAVENOUS EVERY 6 HOURS
Status: DISCONTINUED | OUTPATIENT
Start: 2023-03-14 | End: 2023-03-14

## 2023-03-14 RX ORDER — MORPHINE SULFATE 1 MG/ML
INJECTION, SOLUTION EPIDURAL; INTRATHECAL; INTRAVENOUS PRN
Status: DISCONTINUED | OUTPATIENT
Start: 2023-03-14 | End: 2023-03-14 | Stop reason: SDUPTHER

## 2023-03-14 RX ORDER — SODIUM CHLORIDE, SODIUM LACTATE, POTASSIUM CHLORIDE, CALCIUM CHLORIDE 600; 310; 30; 20 MG/100ML; MG/100ML; MG/100ML; MG/100ML
INJECTION, SOLUTION INTRAVENOUS CONTINUOUS
Status: DISCONTINUED | OUTPATIENT
Start: 2023-03-14 | End: 2023-03-14

## 2023-03-14 RX ORDER — ONDANSETRON 2 MG/ML
4 INJECTION INTRAMUSCULAR; INTRAVENOUS EVERY 6 HOURS PRN
Status: DISCONTINUED | OUTPATIENT
Start: 2023-03-14 | End: 2023-03-14

## 2023-03-14 RX ORDER — SODIUM CHLORIDE 0.9 % (FLUSH) 0.9 %
5-40 SYRINGE (ML) INJECTION EVERY 12 HOURS SCHEDULED
Status: DISCONTINUED | OUTPATIENT
Start: 2023-03-14 | End: 2023-03-16 | Stop reason: HOSPADM

## 2023-03-14 RX ORDER — SENNA AND DOCUSATE SODIUM 50; 8.6 MG/1; MG/1
1 TABLET, FILM COATED ORAL DAILY
Qty: 30 TABLET | Refills: 1 | Status: SHIPPED | OUTPATIENT
Start: 2023-03-14

## 2023-03-14 RX ADMIN — KETOROLAC TROMETHAMINE 30 MG: 30 INJECTION, SOLUTION INTRAMUSCULAR at 09:50

## 2023-03-14 RX ADMIN — MORPHINE SULFATE 0.2 MG: 1 INJECTION, SOLUTION EPIDURAL; INTRATHECAL; INTRAVENOUS at 08:05

## 2023-03-14 RX ADMIN — KETOROLAC TROMETHAMINE 30 MG: 30 INJECTION, SOLUTION INTRAMUSCULAR; INTRAVENOUS at 16:09

## 2023-03-14 RX ADMIN — BUPIVACAINE HYDROCHLORIDE IN DEXTROSE 2 ML: 7.5 INJECTION, SOLUTION SUBARACHNOID at 08:05

## 2023-03-14 RX ADMIN — SODIUM CHLORIDE: 9 INJECTION, SOLUTION INTRAVENOUS at 07:32

## 2023-03-14 RX ADMIN — DOCUSATE SODIUM 100 MG: 100 CAPSULE ORAL at 16:09

## 2023-03-14 RX ADMIN — KETOROLAC TROMETHAMINE 30 MG: 30 INJECTION, SOLUTION INTRAMUSCULAR; INTRAVENOUS at 09:50

## 2023-03-14 RX ADMIN — IBUPROFEN 600 MG: 600 TABLET, FILM COATED ORAL at 22:18

## 2023-03-14 RX ADMIN — ONDANSETRON 4 MG: 2 INJECTION INTRAMUSCULAR; INTRAVENOUS at 17:02

## 2023-03-14 RX ADMIN — ACETAMINOPHEN 1000 MG: 500 TABLET ORAL at 22:18

## 2023-03-14 RX ADMIN — TRANEXAMIC ACID 1000 MG: 10 INJECTION, SOLUTION INTRAVENOUS at 08:11

## 2023-03-14 RX ADMIN — Medication 500 MG: at 08:05

## 2023-03-14 RX ADMIN — SODIUM CHLORIDE, PRESERVATIVE FREE 20 MG: 5 INJECTION INTRAVENOUS at 07:41

## 2023-03-14 RX ADMIN — PHENYLEPHRINE HYDROCHLORIDE 50 MCG/MIN: 10 INJECTION INTRAVENOUS at 08:06

## 2023-03-14 RX ADMIN — Medication 1 TABLET: at 16:08

## 2023-03-14 RX ADMIN — ONDANSETRON 4 MG: 2 INJECTION INTRAMUSCULAR; INTRAVENOUS at 07:59

## 2023-03-14 RX ADMIN — DOCUSATE SODIUM 100 MG: 100 CAPSULE ORAL at 20:30

## 2023-03-14 RX ADMIN — Medication 909 ML/HR: at 08:32

## 2023-03-14 RX ADMIN — ACETAMINOPHEN 975 MG: 325 TABLET ORAL at 07:40

## 2023-03-14 RX ADMIN — SODIUM CHLORIDE: 9 INJECTION, SOLUTION INTRAVENOUS at 09:41

## 2023-03-14 RX ADMIN — SODIUM CITRATE AND CITRIC ACID MONOHYDRATE 30 ML: 500; 334 SOLUTION ORAL at 07:40

## 2023-03-14 RX ADMIN — SODIUM CHLORIDE: 9 INJECTION, SOLUTION INTRAVENOUS at 18:16

## 2023-03-14 RX ADMIN — Medication 2000 MG: at 07:52

## 2023-03-14 RX ADMIN — ACETAMINOPHEN 1000 MG: 500 TABLET ORAL at 16:09

## 2023-03-14 RX ADMIN — SODIUM CHLORIDE, POTASSIUM CHLORIDE, SODIUM LACTATE AND CALCIUM CHLORIDE: 600; 310; 30; 20 INJECTION, SOLUTION INTRAVENOUS at 09:00

## 2023-03-14 RX ADMIN — METFORMIN HYDROCHLORIDE 500 MG: 500 TABLET, EXTENDED RELEASE ORAL at 18:45

## 2023-03-14 RX ADMIN — SODIUM CHLORIDE, POTASSIUM CHLORIDE, SODIUM LACTATE AND CALCIUM CHLORIDE: 600; 310; 30; 20 INJECTION, SOLUTION INTRAVENOUS at 07:58

## 2023-03-14 ASSESSMENT — PAIN DESCRIPTION - LOCATION
LOCATION: INCISION
LOCATION: ABDOMEN

## 2023-03-14 ASSESSMENT — PAIN SCALES - GENERAL
PAINLEVEL_OUTOF10: 4
PAINLEVEL_OUTOF10: 2
PAINLEVEL_OUTOF10: 0
PAINLEVEL_OUTOF10: 0

## 2023-03-14 ASSESSMENT — PAIN DESCRIPTION - DESCRIPTORS
DESCRIPTORS: ACHING
DESCRIPTORS: ACHING;DULL;CRAMPING

## 2023-03-14 ASSESSMENT — PAIN DESCRIPTION - ORIENTATION
ORIENTATION: MID;LOWER
ORIENTATION: LOWER

## 2023-03-14 ASSESSMENT — PAIN - FUNCTIONAL ASSESSMENT: PAIN_FUNCTIONAL_ASSESSMENT: ACTIVITIES ARE NOT PREVENTED

## 2023-03-14 NOTE — ANESTHESIA PROCEDURE NOTES
Spinal Block    Patient location during procedure: OR  End time: 3/14/2023 8:05 AM  Reason for block: primary anesthetic  Staffing  Performed: resident/CRNA   Anesthesiologist: Fortino Ardon MD  Resident/CRNA: FERNANDO Omer CRNA  Spinal Block  Patient position: sitting  Prep: Betadine  Patient monitoring: cardiac monitor, continuous pulse ox and frequent blood pressure checks  Approach: midline  Location: L3/L4  Provider prep: mask and sterile gloves  Local infiltration: lidocaine  Needle  Needle type: Pencan   Needle gauge: 24 G  Needle length: 4 in  Assessment  Sensory level: T4  Swirl obtained: Yes  CSF: clear  Attempts: 1  Hemodynamics: stable  Preanesthetic Checklist  Completed: patient identified, IV checked, site marked, risks and benefits discussed, surgical/procedural consents, equipment checked, pre-op evaluation, timeout performed, anesthesia consent given, oxygen available, monitors applied/VS acknowledged, fire risk safety assessment completed and verbalized and blood product R/B/A discussed and consented

## 2023-03-14 NOTE — BRIEF OP NOTE
Department of Obstetrics and Gynecology  Obstetrical Brief Operative Report  [id number]     Patient: Moiz Osorio   : 1990  MRN: [id number]       Acct: [de-identified]   Date of Procedure: 3/14/23    Pre-operative Diagnosis: 26 y.o. female [id number] at 39w3d     Repeat  Section 2/2 Pregestational Diabetes      History of  section x4    Fetal Exposure to Letrozole    Alpha Thal Carrier    History of gestational hypertension    AMA (NIPT wnl)    PCOS    BMI 34.5     Post-operative Diagnosis: Same as above and  living infant     Procedure: repeat low transverse  section    Surgeon: Dr. Anna Goodwin   Assistant(s): Marisol Gatica MD, PGY1; Shalom Weir DO, PGY3    Anesthesia: spinal with Duramorph    Information for the patient's :  [place] Girl Arash Cervantes   female   Birth Weight: 7 lb 4.9 oz (3.315 kg)   Information for the patient's :  [place] Girl Vineet Olivares [[id number]]        Findings:  Live Born 7#4 male infant in cephalic presentation with Apgars of 8 at 1 minute and 9 at five minutes, normal appearing uterus tubes and ovaries   Estimated Blood Loss: 450ml immediately post-operatively  Total IV Fluids: 1300 ml  Urine output: 700ml clear urine   Drains:  esqueda catheter  Specimens:  placenta discarded, cord blood, and cord gases  Instrument and Sponge Count: Correct  Complications: none  Condition: Infant stable, transfer to [address], Mother stable, transfer to post anesthesia recovery    See dictated operative report for full details.     Marisol Gatica MD  Ob/Gyn Resident  3/14/2023, 9:19 AM

## 2023-03-14 NOTE — FLOWSHEET NOTE
Pt arrived for scheduled repeat Section. Pt denies any contractions, vaginal bleeding, or leakage of fluid.  Positive fetal movement

## 2023-03-14 NOTE — PLAN OF CARE
Problem: Chronic Conditions and Co-morbidities  Goal: Patient's chronic conditions and co-morbidity symptoms are monitored and maintained or improved  Outcome: Progressing     Problem: Pain  Goal: Verbalizes/displays adequate comfort level or baseline comfort level  Outcome: Progressing     Problem: Postpartum  Goal: Experiences normal postpartum course  Description:  Postpartum OB-Pregnancy care plan goal which identifies if the mother is experiencing a normal postpartum course  Outcome: Progressing  Goal: Appropriate maternal -  bonding  Description:  Postpartum OB-Pregnancy care plan goal which identifies if the mother and  are bonding appropriately  Outcome: Progressing  Goal: Establishment of infant feeding pattern  Description:  Postpartum OB-Pregnancy care plan goal which identifies if the mother is establishing a feeding pattern with their   Outcome: Progressing  Goal: Incisions, wounds, or drain sites healing without S/S of infection  Outcome: Progressing     Problem: Infection - Adult  Goal: Absence of infection at discharge  Outcome: Progressing  Goal: Absence of infection during hospitalization  Outcome: Progressing  Goal: Absence of fever/infection during anticipated neutropenic period  Outcome: Progressing     Problem: Safety - Adult  Goal: Free from fall injury  Outcome: Progressing     Problem: Discharge Planning  Goal: Discharge to home or other facility with appropriate resources  Outcome: Progressing

## 2023-03-14 NOTE — PROGRESS NOTES
POST OPERATIVE DAY # 1     Marisela Loomis is a 26 y.o. female   This patient was seen and examined today. Patient seen with  Lamine Jackson [id number]. Today she is doing well without any chief complaint. Her lochia is light. She denies chest pain, shortness of breath, headache, lightheadedness, blurred vision and peripheral edema. She is Breast feeding and she denies any signs or symptoms of mastitis. She is  ambulating well. She is voiding without difficulty. She currently denies S/S of postpartum depression. Flatus present. Bowel movement absent. She is tolerating solids.     Vital Signs:  Vitals:    03/14/23 1609 03/14/23 2015 03/15/23 0015 03/15/23 0415   BP: 132/83 126/83 129/82 130/86   Pulse: 65 73 72 82   Resp: 18 17 17 17   Temp: 99.1 °F (37.3 °C) 98.1 °F (36.7 °C)     TempSrc: Oral Oral     SpO2: 98% 97%       Urine Input & Output last 24hrs:     Intake/Output Summary (Last 24 hours) at 3/15/2023 4678  Last data filed at 3/14/2023 2215  Gross per 24 hour   Intake 3900 ml   Output 3190 ml   Net 710 ml     Physical Exam:  General:  no apparent distress, alert and cooperative  Neurologic:  alert, oriented, normal speech, no focal findings or movement disorder noted  Lungs:  No increased work of breathing, good air exchange, clear to auscultation bilaterally, no crackles or wheezing  Heart:  Regular rate and rhythm, normal S1 and S2  Abdomen: abdomen soft, non-distended, non-tender  Fundus: non-tender, firm, below umbilicus  Incision: Silver dressing in place with no saturation  Extremities:  no calf tenderness, non edematous    Labs:  Lab Results   Component Value Date    WBC 15.4 (H) 03/14/2023    HGB 11.3 (L) 03/14/2023    HCT 34.2 (L) 03/14/2023    MCV 82.2 (L) 03/14/2023     03/14/2023     Assessment/Plan:  Marisela Loomis is a [id number] POD # 1 s/p RLTCS   - Doing well, VSS    - female infant in [address]   - Encourage ambulation and use of incentive spirometer   - D/C esqueda catheter and saline lock IV on POD #1    - Hgb on admission 11.3,  Postpartum CBC pending   - Motrin/Tylenol/Blanca for pain control  Rh positive/Rubella immune   - Rhogam and MMR not indicated  Breast feeding   - Denies s/s mastitis  Pregestational DM   - AC/HS blood glucose. Will consider discontinuing as 24 hours of glucose monitoring within normal ranges   - Medium dose insulin sliding scale in place. Patient has not required any coverage. Consider discontinuation today   - Metformin 500 XR qHS started in PP period. Patient was on it prepregnancy               - Denies s/s of hypoglycemia   BMI 34  Continue post-op care. Counseling Completed:  Secondary Smoke risks and Sudden Infant Death Syndrome were reviewed with recommendations. Infant sleeping, \"back to sleep\" and avoidance of co-sleeping recommendations were reviewed. Signs and Symptoms of Post Partum Depression were reviewed. The patient is to call if any occur. Signs and symptoms of Mastitis were reviewed. The patient is to call if any occur for follow up. Discharge instructions including pelvic rest, incision care, 15 lb weight restriction, no driving with pain medicine and office follow-up were reviewed with patient     Attending Physician: Dr. Nimo Westbrook MD  Ob/Gyn Resident  3/15/2023, 6:33 AM      Date: 3/15/2023  Time: 8:33 AM      Patient Name: Pedro Nguyễn  Patient : 1990  Room/Bed: 4233/5572-69  Admission Date/Time: 3/14/2023  6:20 AM        Attending Physician Statement  I have personally seen, evaluated and discussed the care of Pedro Nguyễn, including pertinent history and exam findings with the resident. I have reviewed and edited their note in the electronic medical record. The key elements of all parts of the encounter have been performed/reviewed by me. I agree with the assessment, plan and orders as documented by the resident.      The level of care submitted represents to the best of my ability the care documented in the medical record today. GC Modifier. This service has been performed in part by a resident under the direction of a teaching physician. Attending's Name:  Enrique Mishra MD      Patient doing well. She has no concerns or complaints. Continue routine post-partum care.

## 2023-03-14 NOTE — PLAN OF CARE
Patient admitted to room 734 from L&D via bed. Oriented to room and surroundings. Plan of care reviewed. Verbalized understanding. Instructed on infant security and safe sleep practices. Preventing falls education provided . The following handouts given: A New Beginning: Your Guide to Postpartum Care, Rounding, gs Security System,Babies Cry A lot, Safe Sleep, Security and Visitation Guidelines. Call light placed within reach.

## 2023-03-14 NOTE — CARE COORDINATION
ANTEPARTUM NOTE    Delivery by elective  section [O82]  39 weeks gestation of pregnancy [Z3A.39]    Aung Monson was admitted to L&D on 3/13/2023 for  2/ pregestational DM @ 44     OB GYN Provider: Dr Anh Thompson    Will meet with patient after delivery to verify name/address/phone/insurance and discuss discharge planning. Anticipate DC home 2 nights after vaginal delivery or 4 nights after C/S delivery as long as hemodynamically stable.

## 2023-03-14 NOTE — OP NOTE
Operative Note  Department of Obstetrics and Gynecology  [facility]     Patient: Pedro Nguyễn   : 1990  MRN: [id number]       Acct: [de-identified]   PCP: Marianna Silva MD  Date of Procedure: 3/14/23    Pre-operative Diagnosis: 26 y.o. female [id number] at 39w3d     Repeat  Section 2/2 Pregestational Diabetes      History of  section x4    Fetal Exposure to Letrozole    Alpha Thal Carrier    History of gestational hypertension    AMA (NIPT wnl)    PCOS    BMI 34.5      Post-operative Diagnosis: same as above and  living infant Female    Procedure: repeat low transverse  section    Indications: Radha Solomon 26 y.o. female [id number] @ 38w3d presents for repeat  section 2/2 pregestational DM with a history of  section x4. Surgeon: Dr. Richie Newman   Assistant(s): Bharati Saavedra MD, PGY1; Shanice Giordano DO, PGY3    Anesthesia: spinal with duramorph    Procedure Details   The patient was seen pre-operatively. The risks, benefits, complications, treatment options, and expected outcomes were discussed with the patient. The patient concurred with the proposed plan, giving informed consent. The patient was taken to the Operating Room, identified as Radha Solomno and the procedure verified as  Delivery. A Time Out was held and the above information confirmed. After spinal anesthesia, the patient was draped and prepped in the usual sterile manner. A Pfannenstiel incision was made and carried down through the subcutaneous tissue to the fascia using scalpel. Fascial incision was made and extended transversely using fine scissors for sharp dissection. The fascia was  from the underlying rectus tissue superiorly and inferiorly using blunt dissection and fine scissors. The peritoneum was identified and entered bluntly. Peritoneal incision was extended longitudinally with blunt stretch, bladder retractor was placed.  A low transverse uterine incision was made using a new scalpel blade. Blunt stretch on the hysterotomy incision was made and the amniotomy was performed revealing clear fluid fluid. Delivered from cephalic presentation was a Live Born female infant. The infant was suctioned, dried and the umbilical cord was clamped and cut after one minute delayed cord clamping. The infant was taken to the warmer and attended by NICU for evaluation. A second section of cord was clamped and cut and sent for gases. Cord blood was obtained for evaluation. The placenta was removed spontaneously with gentle traction and appeared intact, whole, and that the umbilical cord had three vessels noted. Pitocin was started. The uterine outline appeared normal. The uterus was exteriorized and cleaned of all clots and debris. The uterine incision was closed with running unlocked sutures of 0-Monocryl. An imbricating layer of 0-Monocryl was placed. Hemostasis was observed. The uterus was reintroduced into the abdominal cavity. Bilateral abdominal gutters were cleared of all clots and debris. Bilateral tubes and ovaries were visualized and appeared normal. The hysterotomy was again inspected and found to be hemostatic. Rectus muscles were inspected and found to be hemostatic. The fascia was then reapproximated with running sutures of 0 Vicryl. The subcuticular space was irrigated copiously. The skin was reapproximated with a 4-0 Monocryl. The skin was then cleansed and dressed with a bandage in sterile fashion. Instrument, sponge, and needle counts were correct prior the abdominal closure and at the conclusion of the case. The urine remained clear throughout the case. Ancef was given for antibiotic prophylaxis. SCDs for DVT prophylaxis remain in place for the post operative period. Dr. Francheska Thayer was present for the entire operation.     Findings:  Live Born 7#4 male infant in cephalic presentation with Apgars of 8 at 1 minute and 9 at five minutes, normal appearing uterus tubes and ovaries   Estimated Blood Loss: 450ml immediately post-operatively  Total IV Fluids: 1300 ml  Urine output: 700ml clear urine   Drains:  esqueda catheter  Specimens:  placenta discarded, cord blood, and cord gases  Instrument and Sponge Count: Correct  Complications: none  Condition: Infant stable, transfer to [facility], Mother stable, transfer to post anesthesia recovery    Eve Levy MD  OB/GYN Resident  3/14/2023, 9:25 AM    This dictation was performed by a resident physician and then was thoroughly reviewed by the attending prior to being signed.

## 2023-03-14 NOTE — CARE COORDINATION
CASE MANAGEMENT POST-PARTUM TRANSITIONAL CARE PLAN    Delivery by elective  section [O82]  39 weeks gestation of pregnancy [Z3A.39]    OB Provider: Dr Teressa Sandhoff met w/ Piotr Young at bedside to discuss DCP. She is S/P CS on 3/14/2023    Writer verified address/phone number correct on facesheet. She states she lives with her  Elvis Mazariegos and four other children. Piotr Young verbalized no difficulties with transportation to and from doctors appointments or with paying for medications upon discharge home. [facility] medicaid insurance correct. Writer notified Piotr Young that she has 30 days from date of birth to add  to insurance policy by calling job and family services. She verbalized understanding. Piotr Young confirmed a safe place for infant to sleep at home. Infant name on BC: Marco A Lombardo. Infant PCP Dr Katrina Lay.      DME: used glucometer while pregnant and has  HOME CARE: no    Anticipate DC of couplet 3/18/2023    Readmission Risk              Risk of Unplanned Readmission:  7

## 2023-03-14 NOTE — ANESTHESIA POSTPROCEDURE EVALUATION
Department of Anesthesiology  Postprocedure Note    Patient: Denver Chilel  MRN: [id number]  YOB: 1990  Date of evaluation: 3/14/2023      Procedure Summary     Date: 23 Room / Location: [address]    Anesthesia Start: 9621 Anesthesia Stop: 1556    Procedure:  SECTION (Bilateral: Abdomen) Diagnosis:       Delivery by elective  section      (Delivery by elective  section [O82])    Surgeons: Alan Leary MD Responsible Provider: Lovett Essex, MD    Anesthesia Type: spinal ASA Status: 2          Anesthesia Type: No value filed.     Micky Phase I: Micky Score: 10    Micky Phase II: Micky Score: 10      Anesthesia Post Evaluation    Patient location during evaluation: PACU  Patient participation: complete - patient participated  Level of consciousness: awake and alert  Pain score: 3  Airway patency: patent  Nausea & Vomiting: no nausea and no vomiting  Complications: no  Cardiovascular status: hemodynamically stable  Respiratory status: acceptable  Hydration status: euvolemic

## 2023-03-14 NOTE — PLAN OF CARE
Problem: Chronic Conditions and Co-morbidities  Goal: Patient's chronic conditions and co-morbidity symptoms are monitored and maintained or improved  3/14/2023 182 by Paras Jorgensen RN  Outcome: Progressing  3/14/2023 182 by Paras Jorgensen RN  Outcome: Progressing     Problem: Pain  Goal: Verbalizes/displays adequate comfort level or baseline comfort level  3/14/2023 182 by Paras Jorgensen RN  Outcome: Progressing  3/14/2023 182 by Paras Jorgensen RN  Outcome: Progressing     Problem: Postpartum  Goal: Experiences normal postpartum course  Description:  Postpartum OB-Pregnancy care plan goal which identifies if the mother is experiencing a normal postpartum course  3/14/2023 182 by Paras Jorgensen RN  Outcome: Progressing  3/14/2023 182 by Paras Jorgensen RN  Outcome: Progressing  Goal: Appropriate maternal -  bonding  Description:  Postpartum OB-Pregnancy care plan goal which identifies if the mother and  are bonding appropriately  3/14/2023 1824 by Paras Jorgensen RN  Outcome: Progressing  3/14/2023 182 by Paras Jorgensen RN  Outcome: Progressing  Goal: Establishment of infant feeding pattern  Description:  Postpartum OB-Pregnancy care plan goal which identifies if the mother is establishing a feeding pattern with their   3/14/2023 1824 by Paras Jorgensen RN  Outcome: Progressing  3/14/2023 1820 by Paras Jorgensen RN  Outcome: Progressing  Goal: Incisions, wounds, or drain sites healing without S/S of infection  3/14/2023 1824 by Paras Jorgensen RN  Outcome: Progressing  3/14/2023 182 by Paras Jorgensen RN  Outcome: Progressing     Problem: Infection - Adult  Goal: Absence of infection at discharge  3/14/2023 1824 by Paras Jorgensen RN  Outcome: Progressing  3/14/2023 1820 by Paras Jorgensen RN  Outcome: Progressing  Goal: Absence of infection during hospitalization  3/14/2023 1824 by Paras Jorgensen RN  Outcome: Progressing  3/14/2023 182 by Paras Jorgensen RN  Outcome: Progressing  Goal: Absence of fever/infection during anticipated neutropenic period  3/14/2023 1824 by Raheel Canchola RN  Outcome: Progressing  3/14/2023 1820 by Raheel Canchola, RN  Outcome: Progressing     Problem: Safety - Adult  Goal: Free from fall injury  3/14/2023 1824 by Raheel Canchola, RN  Outcome: Progressing  3/14/2023 1820 by Raheel Canchola, RN  Outcome: Progressing     Problem: Discharge Planning  Goal: Discharge to home or other facility with appropriate resources  3/14/2023 1824 by Raheel Canchola RN  Outcome: Progressing  3/14/2023 1820 by Raheel Canchola, RN  Outcome: Progressing

## 2023-03-14 NOTE — ANESTHESIA PRE PROCEDURE
Department of Anesthesiology  Preprocedure Note       Name:  Chris Ascencio   Age:  26 y.o.  :  1990                                          MRN:  [id number]         Date:  3/14/2023      Surgeon: Vidya David):  Lily Hou MD    Procedure: Procedure(s):   SECTION    Medications prior to admission:   Prior to Admission medications    Medication Sig Start Date End Date Taking?  Authorizing Provider   insulin NPH (HUMULIN N;NOVOLIN N) 100 UNIT/ML injection vial Inject 26 Units into the skin nightly 3/7/23   Lily Hou MD   insulin aspart (NOVOLOG FLEXPEN) 100 UNIT/ML injection pen Inject 16 Units into the skin 3 times daily (before meals) 16u before breakfast, 22u before lunch and 24u before dinner 3/1/23   Historical Provider, MD   Cholecalciferol (VITAMIN D3) 50 MCG ( UT) TABS TAKE ONE TABLET BY MOUTH EVERY DAY FOR VITAMIN D ????? ??? ???? ?? ???? ???? ?? ??? ?????? ??? ??????? ? 22   Historical Provider, MD   vitamin B-12 (CYANOCOBALAMIN) 1000 MCG tablet TAKE ONE TABLET BY MOUTH EVERY DAY FOR VITAMIN B12 ?????? ??? ???? ?? ??? ???????? ?? 12 22   Historical Provider, MD   cetirizine (ZYRTEC) 10 MG tablet TAKE ONE TABLET BY MOUTH EVERY DAY AT BEDTIME FOR ALLERGIES ?? ????? ?????? ?? ???? ?? ??? ?? ??? ????? ???????? 10/24/22   Historical Provider, MD   hydrOXYzine HCl (ATARAX) 25 MG tablet TAKE ONE TABLET BY MOUTH EVERY DAY AT BEDTIME FOR ITCHING ?? ????? ?????? ?? ???? ?? ??? ?? ??? ????? ????? 10/24/22   Historical Provider, MD   furosemide (LASIX) 20 MG tablet  10/24/22   Historical Provider, MD   Magnesium Oxide (MAGNESIUM-OXIDE) 250 MG TABS tablet TAKE ONE TABLET BY MOUTH EVERY DAY WITH A MEAL FOR MAGNESIUM ?? ????? ?????? ?? ??? ?? ???? ?? ?????????? 10/24/22   Historical Provider, MD   melatonin 3 MG TABS tablet TAKE ONE TABLET BY MOUTH EVERY DAY AT BEDTIME AS NEEDED FOR SLEEP ?? ????? ?????? ?? ???? ?? ??? ?? ??? ????? ??? ?????? ????? 10/24/22   Historical Provider, MD   diclofenac sodium (VOLTAREN) 1 % GEL  10/24/22   Historical Provider, MD   ASPIRIN LOW DOSE 81 MG chewable tablet CHEW ONE TABLET BY MOUTH EVERY DAY UNTIL DELIVERY OF BABY ???? ???? ???? ?? ??? ??? ????? ????? 10/24/22   Historical Provider, MD   SLOW RELEASE IRON 50 MG TBCR TAKE ONE TABLET BY MOUTH EVERY DAY FOR ANEMIA?? ????? ?????? ?? ??? ???? ???? 8/22/22   Historical Provider, MD   folic acid (FOLVITE) 190 MCG tablet TAKE 2 TABLETS BY MOUTH DAILY EVERY DAY FOR ANEMIA ????? ????? ?? ???? ???? ?????? ?? ??? ????? ??? ???? 8/31/22   Historical Provider, MD   ONETOUCH ULTRA strip  8/31/22   Historical Provider, MD   [facility] [facility] [address] [facility]  8/31/22   Historical Provider, MD   Alcohol Swabs (ULTRA-CARE ALCOHOL PREP PADS) 70 % PADS  8/31/22   Historical Provider, MD   CLEARLAX 17 GM/SCOOP powder MIX 17GRAMS (1 CAPFUL) IN 8 OUNCES OF JUICE OR WATER AND DRINK BY MOUTH DAILY FOR CONSTIPATION ? ??? 17 ??????  ?? 8 ?????? ?? ?????? ?? ????  Patient not taking: No sig reported 8/22/22   Historical Provider, MD   Prenatal Vit-Fe Fumarate-FA (VOL-NIA) 28-1 MG TABS  3/14/17   Historical Provider, MD       Current medications:    Current Facility-Administered Medications   Medication Dose Route Frequency Provider Last Rate Last Admin    lactated ringers bolus  1,000 mL IntraVENous Once Kenyatta Oswald MD        sodium chloride flush 0.9 % injection 10 mL  10 mL IntraVENous PRN Kenyatta Oswald MD        0.9 % sodium chloride infusion   IntraVENous PRN Kenyatta Owsald MD        citric acid-sodium citrate (BICITRA) solution 30 mL  30 mL Oral Once Kenyatta Oswald MD        famotidine (PEPCID) 20 mg in sodium chloride (PF) 0.9 % 10 mL injection  20 mg IntraVENous Once Kenyatta Oswald MD        acetaminophen (TYLENOL) tablet 975 mg  975 mg Oral Once Kenyatta Oswald MD        oxytocin (PITOCIN) 30 units in 500 mL infusion  87.3 arron-units/min IntraVENous Continuous PRN Kenyatta Oswald MD And    oxytocin (PITOCIN) 10 unit bolus from the bag  10 Units IntraVENous PRN Zoey Parker MD        ondansetron [facility]) injection 4 mg  4 mg IntraVENous Q6H PRN Zoey Parker MD        ceFAZolin (ANCEF) 2000 mg in sterile water 20 mL IV syringe  2,000 mg IntraVENous Once Zoey Parker MD        0.9 % sodium chloride infusion   IntraVENous Continuous Marcellus Patient,  mL/hr at 23 0732 New Bag at 23 0732    tranexamic acid-NaCl IVPB premix 1,000 mg  1,000 mg IntraVENous Once Gwendloyn Rinne, DO           Allergies: Allergies   Allergen Reactions    Latex Hives    Adhesive Tape      Latex       Problem List:    Patient Active Problem List   Diagnosis Code    H/O C/S x4 Z98.891    Type 2 DM- Class B O24.119    Rh+/RI/GBS unk O09.92    RLTCS 17 F APG  wt ? Z98.891    Medication exposure during first trimester of pregnancy O09.891    PCOS (polycystic ovarian syndrome) E28.2    Multigravida of advanced maternal age in third trimester O09.523    Use of letrozole (Femara) Z79.811    Needs flu shot Z23    Type 2 diabetes mellitus ([facility] [address] E11.9    Rh+/RI/GBSneg Z3A.39    Alpha thalassemia silent carrier D56.3    History of gestational hypertension Z87.59       Past Medical History:        Diagnosis Date    Anemia     Gestational diabetes mellitus     Migraine     Type 2 diabetes mellitus ([facility] [address]        Past Surgical History:        Procedure Laterality Date    CARPAL TUNNEL RELEASE Bilateral      SECTION      X3     SECTION N/A 2017     SECTION performed by Fitz [facility] L[facility]D OR    TONSILLECTOMY         Social History:    Social History     Tobacco Use    Smoking status: Never    Smokeless tobacco: Never   Substance Use Topics    Alcohol use:  No                                Counseling given: Not Answered      Vital Signs (Current):   Vitals:    23 0642   BP: 126/85   Pulse: 91   Resp: 16   Temp: 36.9 °C (98.4 °F)   TempSrc: Oral                                              BP Readings from Last 3 Encounters:   03/14/23 126/85   03/07/23 129/81   03/06/23 128/84       NPO Status:                                                                                 BMI:   Wt Readings from Last 3 Encounters:   03/07/23 177 lb (80.3 kg)   03/06/23 174 lb (78.9 kg)   02/28/23 176 lb (79.8 kg)     There is no height or weight on file to calculate BMI.    CBC:   Lab Results   Component Value Date/Time    WBC 12.4 02/06/2023 11:20 AM    RBC 4.19 02/06/2023 11:20 AM    HGB 11.2 02/06/2023 11:20 AM    HCT 35.4 02/06/2023 11:20 AM    MCV 84.5 02/06/2023 11:20 AM    RDW 14.2 02/06/2023 11:20 AM     02/06/2023 11:20 AM       CMP:   Lab Results   Component Value Date/Time     08/23/2017 06:36 PM    K 3.6 08/23/2017 06:36 PM    CL 99 08/23/2017 06:36 PM    CO2 22 08/23/2017 06:36 PM    BUN 4 08/23/2017 06:36 PM    CREATININE 0.31 08/23/2017 06:36 PM    GFRAA >60 08/23/2017 06:36 PM    LABGLOM >60 08/23/2017 06:36 PM    GLUCOSE 78 08/23/2017 06:36 PM    PROT 7.2 08/23/2017 06:36 PM    CALCIUM 9.1 08/23/2017 06:36 PM    BILITOT 0.31 08/23/2017 06:36 PM    ALKPHOS 75 08/23/2017 06:36 PM    AST 10 08/23/2017 06:36 PM    ALT 8 08/23/2017 06:36 PM       POC Tests: No results for input(s): POCGLU, POCNA, POCK, POCCL, POCBUN, POCHEMO, POCHCT in the last 72 hours.     Coags: No results found for: PROTIME, INR, APTT    HCG (If Applicable):   Lab Results   Component Value Date    PREGTESTUR Positive 02/08/2021    HCG NEGATIVE 08/19/2016    HCGQUANT <1 02/18/2022        ABGs: No results found for: PHART, PO2ART, BWP1HLY, VML3GCW, BEART, O3IIPXDJ     Type & Screen (If Applicable):  No results found for: LABABO, LABRH    Drug/Infectious Status (If Applicable):  Lab Results   Component Value Date/Time    HEPCAB NONREACTIVE 08/30/2022 11:40 AM       COVID-19 Screening (If Applicable): No results found for: COVID19        Anesthesia Evaluation    Airway: Mallampati: II  TM distance: >3 FB   Neck ROM: full  Mouth opening: > = 3 FB   Dental:          Pulmonary:                              Cardiovascular:                      Neuro/Psych:   (+) headaches:,             GI/Hepatic/Renal:             Endo/Other:    (+) Diabetesusing insulin, . Abdominal:             Vascular: Other Findings:           Anesthesia Plan      spinal     ASA 2             Anesthetic plan and risks discussed with patient. Use of blood products discussed with patient whom consented to blood products. Plan discussed with attending.           Post-op pain plan if not by surgeon: intrathecal narcotics            FERNANDO Dupont - CRNA   3/14/2023

## 2023-03-14 NOTE — CONSULTS
Lactation round made. Mother states breastfeeding went well in recovery. Mother has breastfeed before. Mother has a pump at home. Baby is in nursery. Educated family on skin to skin and sleepy phase. Educated on cluster feeding. Encouraged mother to call out at next feeding for breastfeeding assistance. Breastfeeding packet given.

## 2023-03-15 LAB
HCT VFR BLD AUTO: 31.2 % (ref 36.3–47.1)
HGB BLD-MCNC: 9.8 G/DL (ref 11.9–15.1)
MCH RBC QN AUTO: 26.6 PG (ref 25.2–33.5)
MCHC RBC AUTO-ENTMCNC: 31.4 G/DL (ref 28.4–34.8)
MCV RBC AUTO: 84.8 FL (ref 82.6–102.9)
NRBC AUTOMATED: 0 PER 100 WBC
PDW BLD-RTO: 15.2 % (ref 11.8–14.4)
PLATELET # BLD AUTO: 212 K/UL (ref 138–453)
PMV BLD AUTO: 10.1 FL (ref 8.1–13.5)
RBC # BLD: 3.68 M/UL (ref 3.95–5.11)
WBC # BLD AUTO: 16.2 K/UL (ref 3.5–11.3)

## 2023-03-15 PROCEDURE — 6370000000 HC RX 637 (ALT 250 FOR IP)

## 2023-03-15 PROCEDURE — 36415 COLL VENOUS BLD VENIPUNCTURE: CPT

## 2023-03-15 PROCEDURE — 85027 COMPLETE CBC AUTOMATED: CPT

## 2023-03-15 PROCEDURE — 2580000003 HC RX 258: Performed by: STUDENT IN AN ORGANIZED HEALTH CARE EDUCATION/TRAINING PROGRAM

## 2023-03-15 PROCEDURE — 6370000000 HC RX 637 (ALT 250 FOR IP): Performed by: STUDENT IN AN ORGANIZED HEALTH CARE EDUCATION/TRAINING PROGRAM

## 2023-03-15 PROCEDURE — 1220000000 HC SEMI PRIVATE OB R&B

## 2023-03-15 RX ORDER — FERROUS SULFATE 325(65) MG
325 TABLET ORAL 2 TIMES DAILY
Qty: 60 TABLET | Refills: 2 | Status: SHIPPED | OUTPATIENT
Start: 2023-03-15 | End: 2023-04-14

## 2023-03-15 RX ORDER — DIAPER,BRIEF,INFANT-TODD,DISP
EACH MISCELLANEOUS 2 TIMES DAILY PRN
Status: DISCONTINUED | OUTPATIENT
Start: 2023-03-15 | End: 2023-03-16 | Stop reason: HOSPADM

## 2023-03-15 RX ORDER — DIPHENHYDRAMINE HCL 25 MG
25 TABLET ORAL EVERY 6 HOURS PRN
Status: DISCONTINUED | OUTPATIENT
Start: 2023-03-15 | End: 2023-03-16 | Stop reason: HOSPADM

## 2023-03-15 RX ORDER — METFORMIN HYDROCHLORIDE 500 MG/1
500 TABLET, EXTENDED RELEASE ORAL NIGHTLY
Qty: 30 TABLET | Refills: 3 | Status: SHIPPED | OUTPATIENT
Start: 2023-03-15

## 2023-03-15 RX ADMIN — IBUPROFEN 600 MG: 600 TABLET, FILM COATED ORAL at 10:58

## 2023-03-15 RX ADMIN — IBUPROFEN 600 MG: 600 TABLET, FILM COATED ORAL at 04:31

## 2023-03-15 RX ADMIN — IBUPROFEN 600 MG: 600 TABLET, FILM COATED ORAL at 17:54

## 2023-03-15 RX ADMIN — ACETAMINOPHEN 1000 MG: 500 TABLET ORAL at 10:59

## 2023-03-15 RX ADMIN — DOCUSATE SODIUM 100 MG: 100 CAPSULE ORAL at 20:17

## 2023-03-15 RX ADMIN — ACETAMINOPHEN 1000 MG: 500 TABLET ORAL at 17:55

## 2023-03-15 RX ADMIN — OXYCODONE HYDROCHLORIDE 10 MG: 5 TABLET ORAL at 19:36

## 2023-03-15 RX ADMIN — OXYCODONE HYDROCHLORIDE 10 MG: 5 TABLET ORAL at 14:55

## 2023-03-15 RX ADMIN — DOCUSATE SODIUM 100 MG: 100 CAPSULE ORAL at 08:42

## 2023-03-15 RX ADMIN — ACETAMINOPHEN 1000 MG: 500 TABLET ORAL at 04:31

## 2023-03-15 RX ADMIN — METFORMIN HYDROCHLORIDE 500 MG: 500 TABLET, EXTENDED RELEASE ORAL at 17:55

## 2023-03-15 RX ADMIN — Medication 1 TABLET: at 08:42

## 2023-03-15 RX ADMIN — HYDROCORTISONE: 10 CREAM TOPICAL at 17:55

## 2023-03-15 RX ADMIN — DIPHENHYDRAMINE HCL 25 MG: 25 TABLET ORAL at 17:54

## 2023-03-15 RX ADMIN — SODIUM CHLORIDE, PRESERVATIVE FREE 10 ML: 5 INJECTION INTRAVENOUS at 20:17

## 2023-03-15 RX ADMIN — POLYETHYLENE GLYCOL 3350 17 G: 17 POWDER, FOR SOLUTION ORAL at 08:43

## 2023-03-15 RX ADMIN — OXYCODONE HYDROCHLORIDE 10 MG: 5 TABLET ORAL at 08:43

## 2023-03-15 RX ADMIN — DIPHENHYDRAMINE HCL 25 MG: 25 TABLET ORAL at 12:08

## 2023-03-15 RX ADMIN — SODIUM CHLORIDE, PRESERVATIVE FREE 10 ML: 5 INJECTION INTRAVENOUS at 09:03

## 2023-03-15 ASSESSMENT — PAIN SCALES - GENERAL
PAINLEVEL_OUTOF10: 4
PAINLEVEL_OUTOF10: 7
PAINLEVEL_OUTOF10: 4
PAINLEVEL_OUTOF10: 6
PAINLEVEL_OUTOF10: 7
PAINLEVEL_OUTOF10: 3
PAINLEVEL_OUTOF10: 7
PAINLEVEL_OUTOF10: 5

## 2023-03-15 ASSESSMENT — PAIN DESCRIPTION - LOCATION
LOCATION: ABDOMEN
LOCATION: ABDOMEN
LOCATION: INCISION
LOCATION: ABDOMEN;INCISION
LOCATION: INCISION
LOCATION: INCISION

## 2023-03-15 ASSESSMENT — PAIN DESCRIPTION - PAIN TYPE: TYPE: SURGICAL PAIN

## 2023-03-15 ASSESSMENT — PAIN - FUNCTIONAL ASSESSMENT
PAIN_FUNCTIONAL_ASSESSMENT: ACTIVITIES ARE NOT PREVENTED
PAIN_FUNCTIONAL_ASSESSMENT: ACTIVITIES ARE NOT PREVENTED

## 2023-03-15 ASSESSMENT — PAIN DESCRIPTION - DESCRIPTORS
DESCRIPTORS: CRAMPING
DESCRIPTORS: SORE;TENDER;TIGHTNESS
DESCRIPTORS: ACHING;DISCOMFORT;CRAMPING

## 2023-03-15 ASSESSMENT — PAIN DESCRIPTION - ORIENTATION
ORIENTATION: RIGHT;LOWER
ORIENTATION: MID
ORIENTATION: LOWER

## 2023-03-15 NOTE — PROGRESS NOTES
Obstetric/Gynecology Resident Interval Note    Patient seen and evaluated. She complains of pruritic rash on mid abdomen that started yesterday. Today she noticed a pruritic and erythematous rash on bilateral lower extremities extending up to right thigh. She denies any shortness of breath or difficulty swallowing. Suspect contact dermatitis. Hydrocortisone cream ordered for symptomatic relief. Continue to monitor.      Erlinda Freeman DO  OB/GYN Resident, PGY2  [facility]  3/15/2023, 5:24 PM

## 2023-03-15 NOTE — PLAN OF CARE
Problem: Chronic Conditions and Co-morbidities  Goal: Patient's chronic conditions and co-morbidity symptoms are monitored and maintained or improved  3/14/2023 182 by Hung Godfrey RN  Outcome: Progressing  3/14/2023 182 by Hung Godfrey RN  Outcome: Progressing     Problem: Pain  Goal: Verbalizes/displays adequate comfort level or baseline comfort level  3/14/2023 182 by Hung Godfrey RN  Outcome: Progressing  3/14/2023 182 by Hung Godfrey RN  Outcome: Progressing     Problem: Postpartum  Goal: Experiences normal postpartum course  Description:  Postpartum OB-Pregnancy care plan goal which identifies if the mother is experiencing a normal postpartum course  3/14/2023 182 by Hung Godfrey RN  Outcome: Progressing  3/14/2023 182 by Hung Godfrey RN  Outcome: Progressing  Goal: Appropriate maternal -  bonding  Description:  Postpartum OB-Pregnancy care plan goal which identifies if the mother and  are bonding appropriately  3/14/2023 182 by Hung Godfrey RN  Outcome: Progressing  3/14/2023 182 by Hung Godfrey RN  Outcome: Progressing  Goal: Establishment of infant feeding pattern  Description:  Postpartum OB-Pregnancy care plan goal which identifies if the mother is establishing a feeding pattern with their   3/14/2023 182 by Hung Godfrey RN  Outcome: Progressing  3/14/2023 182 by Hung Godfrey RN  Outcome: Progressing  Goal: Incisions, wounds, or drain sites healing without S/S of infection  3/14/2023 182 by Hung Godfrey RN  Outcome: Progressing  3/14/2023 182 by Hung Godfrey RN  Outcome: Progressing     Problem: Infection - Adult  Goal: Absence of infection at discharge  3/14/2023 182 by Hung Godfrey RN  Outcome: Progressing  3/14/2023 1820 by Hung Godfrey RN  Outcome: Progressing  Goal: Absence of infection during hospitalization  3/14/2023 182 by Hung Godfrey RN  Outcome: Progressing  3/14/2023 182 by Hung Godfrey RN  Outcome: Progressing  Goal: Absence of fever/infection during anticipated neutropenic period  3/14/2023 1824 by Haleigh Gonzales RN  Outcome: Progressing  3/14/2023 1820 by Haleigh Gonzales RN  Outcome: Progressing     Problem: Safety - Adult  Goal: Free from fall injury  3/14/2023 1824 by Haleigh Gonzales RN  Outcome: Progressing  3/14/2023 1820 by Haleigh Gonazles RN  Outcome: Progressing     Problem: Discharge Planning  Goal: Discharge to home or other facility with appropriate resources  3/14/2023 1824 by Haleigh Gonzales RN  Outcome: Progressing  3/14/2023 1820 by Haleigh Gonzales RN  Outcome: Progressing

## 2023-03-15 NOTE — LACTATION NOTE
Lactation round made. Mother taking baby off breast, nipple round. Mother reports baby just nursed for 15 minutes. Mother denies pain. Mother asked how to tell how baby is getting enough. Education provided. Mother verbalizes understanding. Encouraged to call out for assistance.

## 2023-03-15 NOTE — CARE COORDINATION
Social Work     Sw reviewed medical record (current active problem list) and tox screens and found no current concerns. Sw spoke with mom and Fob (Monica Black) briefly to explain Sw role, inquire if any needs or concerns, and provide safe sleep education and discuss. Mom provided verbal consent for Fob to be present during assessment. Mom denied any needs or questions and informs baby has a safe sleep environment (Crib). Mom denied any current s/s of anxiety or depression and is aware to reach out to OB if any s/s occur after dc. Mom reports a good support system that includes Fob and friends who visited earlier and denied any current questions or needs. Mom reports this is her 6th child (13, 15, 15, 5). Mom reports that she resides with Fob and 5 children who are very excited for baby. Mom states ped will be [facility]. Sw did provide mom with [facility] [employer] ([employer] [facility]ing Program) flyer so she is aware of this new free resource in [facility]. Sw encouraged mom to reach out if any issues or concerns arise.         Sw Intern Jennifer Riley

## 2023-03-16 VITALS
OXYGEN SATURATION: 98 % | SYSTOLIC BLOOD PRESSURE: 115 MMHG | RESPIRATION RATE: 16 BRPM | TEMPERATURE: 98.5 F | HEART RATE: 91 BPM | DIASTOLIC BLOOD PRESSURE: 82 MMHG

## 2023-03-16 PROCEDURE — 6370000000 HC RX 637 (ALT 250 FOR IP): Performed by: STUDENT IN AN ORGANIZED HEALTH CARE EDUCATION/TRAINING PROGRAM

## 2023-03-16 PROCEDURE — 6370000000 HC RX 637 (ALT 250 FOR IP)

## 2023-03-16 PROCEDURE — 2580000003 HC RX 258: Performed by: STUDENT IN AN ORGANIZED HEALTH CARE EDUCATION/TRAINING PROGRAM

## 2023-03-16 RX ORDER — DIAPER,BRIEF,INFANT-TODD,DISP
EACH MISCELLANEOUS
Qty: 30 G | Refills: 1 | Status: SHIPPED | OUTPATIENT
Start: 2023-03-16 | End: 2023-03-23

## 2023-03-16 RX ADMIN — IBUPROFEN 600 MG: 600 TABLET, FILM COATED ORAL at 12:41

## 2023-03-16 RX ADMIN — IBUPROFEN 600 MG: 600 TABLET, FILM COATED ORAL at 06:24

## 2023-03-16 RX ADMIN — IBUPROFEN 600 MG: 600 TABLET, FILM COATED ORAL at 00:20

## 2023-03-16 RX ADMIN — ACETAMINOPHEN 1000 MG: 500 TABLET ORAL at 06:24

## 2023-03-16 RX ADMIN — MAGNESIUM HYDROXIDE 30 ML: 400 SUSPENSION ORAL at 09:40

## 2023-03-16 RX ADMIN — Medication 1 TABLET: at 09:39

## 2023-03-16 RX ADMIN — OXYCODONE HYDROCHLORIDE 5 MG: 5 TABLET ORAL at 06:25

## 2023-03-16 RX ADMIN — DOCUSATE SODIUM 100 MG: 100 CAPSULE ORAL at 09:39

## 2023-03-16 RX ADMIN — HYDROCORTISONE: 10 CREAM TOPICAL at 00:27

## 2023-03-16 RX ADMIN — SODIUM CHLORIDE, PRESERVATIVE FREE 10 ML: 5 INJECTION INTRAVENOUS at 10:07

## 2023-03-16 RX ADMIN — SIMETHICONE 80 MG: 80 TABLET, CHEWABLE ORAL at 00:20

## 2023-03-16 RX ADMIN — DIPHENHYDRAMINE HCL 25 MG: 25 TABLET ORAL at 00:26

## 2023-03-16 RX ADMIN — OXYCODONE HYDROCHLORIDE 5 MG: 5 TABLET ORAL at 00:23

## 2023-03-16 RX ADMIN — OXYCODONE HYDROCHLORIDE 10 MG: 5 TABLET ORAL at 11:50

## 2023-03-16 RX ADMIN — POLYETHYLENE GLYCOL 3350 17 G: 17 POWDER, FOR SOLUTION ORAL at 09:40

## 2023-03-16 RX ADMIN — ACETAMINOPHEN 1000 MG: 500 TABLET ORAL at 00:19

## 2023-03-16 RX ADMIN — OXYCODONE HYDROCHLORIDE 5 MG: 5 TABLET ORAL at 06:27

## 2023-03-16 RX ADMIN — ACETAMINOPHEN 1000 MG: 500 TABLET ORAL at 12:42

## 2023-03-16 ASSESSMENT — PAIN DESCRIPTION - ORIENTATION: ORIENTATION: RIGHT;MID;LOWER

## 2023-03-16 ASSESSMENT — PAIN SCALES - GENERAL
PAINLEVEL_OUTOF10: 7

## 2023-03-16 ASSESSMENT — PAIN DESCRIPTION - DESCRIPTORS
DESCRIPTORS: ACHING;CRAMPING;DISCOMFORT
DESCRIPTORS: ACHING;DISCOMFORT;CRAMPING

## 2023-03-16 ASSESSMENT — PAIN DESCRIPTION - LOCATION
LOCATION: ABDOMEN
LOCATION: ABDOMEN;INCISION
LOCATION: ABDOMEN
LOCATION: INCISION

## 2023-03-16 ASSESSMENT — PAIN DESCRIPTION - PAIN TYPE: TYPE: SURGICAL PAIN

## 2023-03-16 ASSESSMENT — PAIN - FUNCTIONAL ASSESSMENT: PAIN_FUNCTIONAL_ASSESSMENT: ACTIVITIES ARE NOT PREVENTED

## 2023-03-16 NOTE — PROGRESS NOTES
CLINICAL PHARMACY NOTE: MEDS TO BEDS    Total # of Prescriptions Filled: 5   The following medications were delivered to the patient:  Ibuprofen  Senexon  Oxycodone  Ferosul  metformin    Additional Documentation:

## 2023-03-16 NOTE — LACTATION NOTE
Refined the position. Baby not maintaining a latch. Using a few drops of formula on the nipple, baby latched and began feeding well on the left breast in cradle hold. Encouraged mom to call out for further assistance as needed.

## 2023-03-16 NOTE — FLOWSHEET NOTE
Skin rash on abdomen and extremities is still there and itchy, but redness is fading. New skin rash appearing on patient's back. Hydrocortisone cream applied to all. Benedryl given for itching relief.

## 2023-03-16 NOTE — DISCHARGE INSTRUCTIONS
Follow-up with your OB doctor in 1 week or as specified by your physician. Please refer to A New Beginning-Your Personal Guide to Postpartum Care book provided in your room. Please take this book home with you to refer to. For Breastfeeding moms, you can contact our lactation specialist,  with any problems or questions you may have. Phone number [phone]. Feel free to leave voice mail and your call will be returned as soon as possible. DIET  Eat a well balanced diet focusing on foods high in fiber and protein. Drink 8-10 glasses of fluids daily, especially water. To avoid constipation you may take a mild stool softener as recommended by your doctor or midwife. If taking narcotics, they may constipate you. ACTIVITY  Gradually increase your activity. Resume exercise regimen only after advise by your doctor or midwife. Avoid lifting anything heavier than your baby or a gallon of milk for SIX weeks. Avoid driving 1 week for vaginal delivery and 2 weeks for  section, unless otherwise instructed by physician or if taking any pain medications. Rise slowly from a lying to sitting and then a standing position. Climb stairs carefully. Use caution when carrying your baby up and down the stairs. NO SEXUAL Activity for 6 weeks or until advised by your doctor; Nothing in vagina: intercourse, tampons, or douching. No swimming or hot tubs. Be prepared to discuss family planning at your follow-up OB visit. You may feel tired or have a lack of energy. You may continue your prenatal vitamin to replenish nutrients post delivery. Nap when baby naps to catch up on sleep. EMOTIONS  You may feel valadez, sad, teary, & overwhelmed for the first 2 weeks postpartum. Contact your OB provider if you feel you may be showing signs of postpartum depression, or have thoughts of harming yourself or your infant.   If infant will not stop crying, contact another adult for help or place infant in their crib on their back and take a break. NEVER shake your infant. BLEEDING  Vaginal bleeding will decrease in amount over the next few weeks. You will notice that as your activity increases, your flow may increase. This is your body's way of telling you, you need take things easier and rest more often. Call your OB/ER if you are saturating one maxi pad in an hour & passing large clots. BREAST CARE  Take medications as recommended by your doctor or midwife for pain  If you develop a warm, red, tender area on your breast or develop a fever contact your lactation consultant. For breastfeeding moms:  If you become engorged, feeding may be more difficult or painful for 1-2 days. You may find it helpful to hand express some milk so that the infant can latch on more easily. While breastfeeding, continue to take your prenatal vitamins as directed by your doctor or midwife. Refer to the breastfeeding booklet in the  folder/binder for more information. For any questions or concerns contact a Lactation Consultant. Leave a message and your call will be returned. For NON-breastfeeding moms:  You may apply ice packs to your breasts over you bra for twenty minutes at a time for comfort. Cabbage leaves may be applied to breasts, replace when wilted. Avoid stimulation to your breasts, when showering allow the water to strike your back not your breasts. Do not express milk or your body will make more. Wear a good fitting bra until your milk dries, such as a sports bra. INCISIONAL CARE / PAVITHRA CARE  Shower daily, cleansing incision and perineum with mild soap. After shower pat the incision area dry and allow the area open to air. To keep the incision dry, and if necessary, you may dry it with a hair dryer on the cool setting. If used, Steri-stipes should fall off by 2 weeks. If not please remove them when you are in the shower. Do not briskly pull at strips.   If used, Staples should be removed by the OB office by 1 week. If used/ordered, an abdominal binder may provide support for your incision. Use the galindo-bottle until bleeding stops each time you use the restroom. Cleanse your perineum from front to back. If used, stitches will dissolve in 4-6 weeks. You may use a sitz bath or soak in a clean tub with drain open and water running for comfort. Kegel exercises will help restore bladder control. SWELLING  Try to keep your legs elevated when you are sitting. When lying down keep your legs elevated. CALL THE DOCTOR WITH THESE HEALTH CONCERNS OR PROBLEMS  If you have a temp of 100.4 or more. If your bleeding has increased and you are saturating a pad in an hour. Your abdomen is tender to touch. You are passing blood clots bigger than the size of a lemon. If you are experiencing extreme weakness or dizziness. If you are having flu-like symptoms such as achy muscles or joints. There is a foul smell or a green color to your vaginal bleeding. If you have pain that cannot be relieved. You have persistent burning with urination or frequency. Call if you have concerns about your well-being. You are unable to sleep, eat, or are having thoughts of harming yourself or your baby. You have swelling, bleeding, drainage, foul odor, redness, or warmth in/around your incision or stitches. You have a red, warm, tender area in you calf.

## 2023-03-16 NOTE — FLOWSHEET NOTE
Pt discharged to private residence with personal belongings. Pt transported via wheelchair with  and spouse.

## 2023-03-16 NOTE — LACTATION NOTE
Assisted with calming the baby, position, and deep latch. Baby latched on the left breast in cradle hold. Baby began feeding well.

## 2023-03-16 NOTE — PLAN OF CARE
Problem: Chronic Conditions and Co-morbidities  Goal: Patient's chronic conditions and co-morbidity symptoms are monitored and maintained or improved  Outcome: Completed     Problem: Pain  Goal: Verbalizes/displays adequate comfort level or baseline comfort level  Outcome: Completed     Problem: Postpartum  Goal: Experiences normal postpartum course  Description:  Postpartum OB-Pregnancy care plan goal which identifies if the mother is experiencing a normal postpartum course  Outcome: Completed  Goal: Appropriate maternal -  bonding  Description:  Postpartum OB-Pregnancy care plan goal which identifies if the mother and  are bonding appropriately  Outcome: Completed  Goal: Establishment of infant feeding pattern  Description:  Postpartum OB-Pregnancy care plan goal which identifies if the mother is establishing a feeding pattern with their   Outcome: Completed  Goal: Incisions, wounds, or drain sites healing without S/S of infection  Outcome: Completed     Problem: Infection - Adult  Goal: Absence of infection at discharge  Outcome: Completed  Goal: Absence of infection during hospitalization  Outcome: Completed  Goal: Absence of fever/infection during anticipated neutropenic period  Outcome: Completed     Problem: Safety - Adult  Goal: Free from fall injury  Outcome: Completed     Problem: Discharge Planning  Goal: Discharge to home or other facility with appropriate resources  Outcome: Completed

## 2023-03-16 NOTE — PROGRESS NOTES
POST OPERATIVE DAY # 2    Jose Martin Bush is a 26 y.o. female   This patient was seen and examined today. RLTCS on 3/14/23    Her pregnancy was complicated by:   Patient Active Problem List   Diagnosis    H/O C/S x4    Type 2 DM- Class B    Rh+/RI/GBS unk    RLTCS 11/9/17 F APG 8/9 wt ? Medication exposure during first trimester of pregnancy    PCOS (polycystic ovarian syndrome)    Multigravida of advanced maternal age in third trimester    Use of letrozole (Femara)    Needs flu shot    Type 2 diabetes mellitus ([facility] [address]    Rh+/RI/GBSneg    Alpha thalassemia silent carrier    History of gestational hypertension    RLTCS 3/14/23 F Apg 8/9 Wt 7#4       Today she is doing well without any chief complaint. She has a rash on her abdomen and back that she reports has improved greatly with Hydrocortisone cream. Her lochia is light. She denies chest pain, shortness of breath, headache, lightheadedness, blurred vision and peripheral edema. She is breast feeding and she denies any signs or symptoms of mastitis. She is  ambulating well. She is voiding without difficulty. She currently denies S/S of postpartum depression. Flatus present. Bowel movement absent. She is tolerating solids.     Vital Signs:  Vitals:    03/15/23 1158 03/15/23 1557 03/15/23 2000 03/16/23 0131   BP: 127/66 127/84 117/78 131/89   Pulse: 95 89 98 (!) 104   Resp: 16 16 20 18   Temp: 98.1 °F (36.7 °C) 98.2 °F (36.8 °C) 98.4 °F (36.9 °C) 98 °F (36.7 °C)   TempSrc: Oral Oral Oral Oral   SpO2: 97% 97% 98% 98%         Urine Input & Output last 24hrs:     Intake/Output Summary (Last 24 hours) at 3/16/2023 8002  Last data filed at 3/15/2023 1491  Gross per 24 hour   Intake 510 ml   Output --   Net 510 ml       Physical Exam:  General:  no apparent distress, alert and cooperative  Neurologic:  alert, oriented, normal speech, no focal findings or movement disorder noted  Lungs:  No increased work of breathing, good air exchange  Heart:  Regular rate and rhythm  Abdomen: abdomen soft, non-distended, non-tender  Fundus: non-tender, firm, below umbilicus  Incision: clean, dry, and silver dressing in place with minimal saturation  Extremities:  no calf tenderness, non edematous    Labs:  Lab Results   Component Value Date    WBC 16.2 (H) 03/15/2023    HGB 9.8 (L) 03/15/2023    HCT 31.2 (L) 03/15/2023    MCV 84.8 03/15/2023     03/15/2023       Assessment/Plan:  Cecilio Jacome is a [id number] POD # 2 s/p RLTCS   - Doing well, VSS    - Female infant in [address]   - Encourage ambulation and use of incentive spirometer   - S/p esqueda catheter and saline lock IV on POD #1    - CBC completed; Hgb 9.8 on POD#1   - Motrin/Tylenol/Blanca for pain control     Rh positive/Rubella immune   - Rhogam and MMR not indicated    Breast feeding   - Denies s/s mastitis    Pregestational DM   - Metformin 500 XR qHS restarted for patient   - Denies any s/s of hypoglycemia     Anemia   - Hgb 11.3>9.8    -  mL   - VSS, Clinically asymptomatic   - Rx for iron supplementation on discharge     Abdominal Rash   - Diffusely present on abdomen, does not appear raised; no induration or drainage present   - Patient reports improvement since yesterday   - Hydrocortisone cream and Benadryl prn    BMI 34    Continue post-op care. - Patient desires discharge home today    Counseling Completed:  Secondary Smoke risks and Sudden Infant Death Syndrome were reviewed with recommendations. Infant sleeping, \"back to sleep\" and avoidance of co-sleeping recommendations were reviewed. Signs and Symptoms of Post Partum Depression were reviewed. The patient is to call if any occur. Signs and symptoms of Mastitis were reviewed. The patient is to call if any occur for follow up.   Discharge instructions including pelvic rest, incision care, 15 lb weight restriction, no driving with pain medicine and office follow-up were reviewed with patient     Attending Physician: Dr. Last Latham Makenna Mancera DO  Ob/Gyn Resident  3/16/2023, 6:14 AM    Date: 3/16/2023  Time: 12:05 PM      Patient Name: Cleopatra Salas  Patient : 1990  Room/Bed: 8525/9437-77  Admission Date/Time: 3/14/2023  6:20 AM        Attending Physician Statement  I have discussed the care of Cleopatra Salas, including pertinent history and exam findings with the resident. I have reviewed and edited their note in the electronic medical record. The key elements of all parts of the encounter have been performed/reviewed by me . I agree with the assessment, plan and orders as documented by the resident. The level of care submitted represents to the best of my ability the care documented in the medical record today. GC Modifier. This service has been performed in part by a resident under the direction of a teaching physician.         Attending's Name:  Sp Jean-Baptiste DO

## 2023-03-20 PROBLEM — Z23 NEEDS FLU SHOT: Status: RESOLVED | Noted: 2022-10-19 | Resolved: 2023-03-20

## 2023-03-20 PROBLEM — Z98.891 S/P CESAREAN SECTION: Status: RESOLVED | Noted: 2017-11-09 | Resolved: 2023-03-20

## 2023-03-21 ENCOUNTER — POSTPARTUM VISIT (OUTPATIENT)
Dept: OBGYN CLINIC | Age: 33
End: 2023-03-21

## 2023-03-21 ENCOUNTER — HOSPITAL ENCOUNTER (OUTPATIENT)
Age: 33
Setting detail: SPECIMEN
Discharge: HOME | End: 2023-03-21

## 2023-03-21 VITALS
WEIGHT: 167 LBS | HEART RATE: 83 BPM | DIASTOLIC BLOOD PRESSURE: 99 MMHG | BODY MASS INDEX: 32.79 KG/M2 | SYSTOLIC BLOOD PRESSURE: 146 MMHG | HEIGHT: 60 IN

## 2023-03-21 DIAGNOSIS — R03.0 ELEVATED BLOOD PRESSURE READING: ICD-10-CM

## 2023-03-21 DIAGNOSIS — R21 RASH: ICD-10-CM

## 2023-03-21 LAB
ABSOLUTE EOS #: 0.39 K/UL (ref 0–0.44)
ABSOLUTE IMMATURE GRANULOCYTE: 0.1 K/UL (ref 0–0.3)
ABSOLUTE LYMPH #: 2.19 K/UL (ref 1.1–3.7)
ABSOLUTE MONO #: 0.51 K/UL (ref 0.1–1.2)
ALBUMIN SERPL-MCNC: 3.9 G/DL (ref 3.5–5.2)
ALBUMIN/GLOBULIN RATIO: 1.2 (ref 1–2.5)
ALP SERPL-CCNC: 89 U/L (ref 35–104)
ALT SERPL-CCNC: 29 U/L (ref 5–33)
ANION GAP SERPL CALCULATED.3IONS-SCNC: 11 MMOL/L (ref 9–17)
AST SERPL-CCNC: 16 U/L
BASOPHILS # BLD: 0 % (ref 0–2)
BASOPHILS ABSOLUTE: 0.03 K/UL (ref 0–0.2)
BILIRUB SERPL-MCNC: 0.2 MG/DL (ref 0.3–1.2)
BUN SERPL-MCNC: 17 MG/DL (ref 6–20)
CALCIUM SERPL-MCNC: 9.7 MG/DL (ref 8.6–10.4)
CHLORIDE SERPL-SCNC: 105 MMOL/L (ref 98–107)
CO2 SERPL-SCNC: 25 MMOL/L (ref 20–31)
CREAT SERPL-MCNC: 0.44 MG/DL (ref 0.5–0.9)
CREATININE URINE: 75.1 MG/DL (ref 28–217)
EOSINOPHILS RELATIVE PERCENT: 5 % (ref 1–4)
GFR SERPL CREATININE-BSD FRML MDRD: >60 ML/MIN/1.73M2
GLUCOSE SERPL-MCNC: 85 MG/DL (ref 70–99)
HCT VFR BLD AUTO: 36.2 % (ref 36.3–47.1)
HGB BLD-MCNC: 11.1 G/DL (ref 11.9–15.1)
IMMATURE GRANULOCYTES: 1 %
LYMPHOCYTES # BLD: 26 % (ref 24–43)
MCH RBC QN AUTO: 26.5 PG (ref 25.2–33.5)
MCHC RBC AUTO-ENTMCNC: 30.7 G/DL (ref 28.4–34.8)
MCV RBC AUTO: 86.4 FL (ref 82.6–102.9)
MONOCYTES # BLD: 6 % (ref 3–12)
NRBC AUTOMATED: 0 PER 100 WBC
PDW BLD-RTO: 14.6 % (ref 11.8–14.4)
PLATELET # BLD AUTO: 295 K/UL (ref 138–453)
PMV BLD AUTO: 9.6 FL (ref 8.1–13.5)
POTASSIUM SERPL-SCNC: 4.7 MMOL/L (ref 3.7–5.3)
PROT SERPL-MCNC: 7.1 G/DL (ref 6.4–8.3)
RBC # BLD: 4.19 M/UL (ref 3.95–5.11)
RBC # BLD: ABNORMAL 10*6/UL
SEG NEUTROPHILS: 62 % (ref 36–65)
SEGMENTED NEUTROPHILS ABSOLUTE COUNT: 5.08 K/UL (ref 1.5–8.1)
SODIUM SERPL-SCNC: 141 MMOL/L (ref 135–144)
TOTAL PROTEIN, URINE: 12 MG/DL
URINE TOTAL PROTEIN CREATININE RATIO: 0.16 (ref 0–0.2)
WBC # BLD AUTO: 8.3 K/UL (ref 3.5–11.3)

## 2023-03-21 PROCEDURE — 99024 POSTOP FOLLOW-UP VISIT: CPT | Performed by: STUDENT IN AN ORGANIZED HEALTH CARE EDUCATION/TRAINING PROGRAM

## 2023-03-21 RX ORDER — DIAPER,BRIEF,INFANT-TODD,DISP
EACH MISCELLANEOUS
Qty: 1 EACH | Refills: 0 | Status: SHIPPED | OUTPATIENT
Start: 2023-03-21

## 2023-03-21 NOTE — PROGRESS NOTES
TSH: WNL, HbA1c: 5.6, Pr:Cr WNL, EKG: pending  NST/BPPs @ 32 wks  Growth q 3-4 wks  Novolog 20/26/28; NPH  32 u    Uncontrolled - delivery at 38 wks        H/O C/S x4 04/25/2017     x4             Pennelope Epley, DO [facility]  3/21/2023, 12:15 PM

## 2023-03-26 NOTE — PROGRESS NOTES
Pt is 2 wks postpartum and is here for a BP check. Her initial BP was 142/93 and repeat was 131/89. She has had a HA off and on since delivery that is resolved with motrin. She had PreE labs that were WNL last week. Will give magnesium to help decrease HAs as well. She denies any vision changes or RUQ pain.       Jaja Marroquin MD

## 2023-03-27 ENCOUNTER — POSTPARTUM VISIT (OUTPATIENT)
Dept: OBGYN CLINIC | Age: 33
End: 2023-03-27

## 2023-03-27 VITALS
WEIGHT: 169 LBS | SYSTOLIC BLOOD PRESSURE: 131 MMHG | DIASTOLIC BLOOD PRESSURE: 89 MMHG | HEART RATE: 79 BPM | BODY MASS INDEX: 33.01 KG/M2

## 2023-03-27 RX ORDER — MAGNESIUM OXIDE 400 MG/1
400 TABLET ORAL DAILY
Qty: 30 TABLET | Refills: 0 | Status: SHIPPED | OUTPATIENT
Start: 2023-03-27

## 2023-06-19 RX ORDER — ISOPRPOPYL ALCOHOL 70 ML/100ML
SWAB TOPICAL
Qty: 100 EACH | Refills: 10 | OUTPATIENT
Start: 2023-06-19

## 2023-06-23 RX ORDER — ASPIRIN 81 MG
TABLET,CHEWABLE ORAL
Qty: 30 TABLET | OUTPATIENT
Start: 2023-06-23

## 2023-12-26 NOTE — PROGRESS NOTES
[facility]  [facility] - [address] [place] [address] Adelita Crystal  Dept: [phone]    Chief complaint:   Chief Complaint   Patient presents with    Annual Exam     Pap 8/30/22 wnl/hpv neg    Other     Vaginal discharge and lower abdominal pain       History Present Illness: Godwin Lema is a 25 yo female who presents for her annual exam.  She is having some pain in her lower abdomen and some vaginal irritation. She says that since she had her IUD placed in July she has had some vaginal discharge. She is having white discharge. She says sometimes there is an odor. She is having monthly periods with the Paraguard. She was having some irritation with sex initially with her  but that has resolved. She denies any pelvic pain. She denies any bowel or bladder issues.       Current Medications (OTC/Herbal):   Current Outpatient Medications   Medication Sig Dispense Refill    Elastic Bandages & Supports (ABDOMINAL BINDER/ELASTIC XL) MISC 1 Units by Does not apply route as needed (when walking and for pain) 1 each 0    magnesium oxide (MAG-OX) 400 MG tablet Take 1 tablet by mouth daily 30 tablet 0    hydrocortisone (V-R HYDROCORTISONE/ALOE) 0.5 % ointment Apply topically once a day to affected area 1 each 0    metFORMIN (GLUCOPHAGE XR) 500 MG extended release tablet Take 1 tablet by mouth at bedtime (Patient not taking: Reported on 3/21/2023) 30 tablet 3    ferrous sulfate (IRON 325) 325 (65 Fe) MG tablet Take 1 tablet by mouth 2 times daily (Patient not taking: Reported on 3/21/2023) 60 tablet 2    ibuprofen (ADVIL;MOTRIN) 800 MG tablet Take 1 tablet by mouth every 8 hours as needed for Pain (Patient not taking: Reported on 3/21/2023) 60 tablet 1    sennosides-docusate sodium (SENOKOT-S) 8.6-50 MG tablet Take 1 tablet by mouth daily (Patient not taking: Reported on 3/21/2023) 30 tablet 1    Cholecalciferol (VITAMIN D3) 50 MCG (2000 UT) TABS

## 2023-12-27 ENCOUNTER — HOSPITAL ENCOUNTER (OUTPATIENT)
Age: 33
Setting detail: SPECIMEN
Discharge: HOME | End: 2023-12-27

## 2023-12-27 ENCOUNTER — OFFICE VISIT (OUTPATIENT)
Dept: OBGYN CLINIC | Age: 33
End: 2023-12-27
Payer: MEDICAID

## 2023-12-27 VITALS
DIASTOLIC BLOOD PRESSURE: 89 MMHG | WEIGHT: 163.8 LBS | SYSTOLIC BLOOD PRESSURE: 136 MMHG | HEART RATE: 84 BPM | BODY MASS INDEX: 32.16 KG/M2 | HEIGHT: 60 IN

## 2023-12-27 DIAGNOSIS — N89.8 VAGINAL DISCHARGE: ICD-10-CM

## 2023-12-27 DIAGNOSIS — Z01.419 ENCOUNTER FOR GYNECOLOGICAL EXAMINATION: Primary | ICD-10-CM

## 2023-12-27 PROCEDURE — 99395 PREV VISIT EST AGE 18-39: CPT | Performed by: OBSTETRICS & GYNECOLOGY

## 2023-12-28 LAB
CANDIDA SPECIES: NEGATIVE
GARDNERELLA VAGINALIS: NEGATIVE
SOURCE: NORMAL
TRICHOMONAS: NEGATIVE

## 2024-09-24 NOTE — PROGRESS NOTES
Do not take any aspirin, ibuprofen or Aleve before the surgery.    Nothing to eat after midnight for day of surgery.    Take your lisinopril and your levothyroxine medication with a sip of water on the morning of surgery.  Skip your citalopram and omeprazole that morning.    Bring your oral appliance with in case needed for your sleep apnea in the recovery room.       Latha Zhang is a [id number] @ 38w3d who presents for JODI visit. She denies LOF, VB or Ctxs.  + FM. She denies any complaints. She denies any fevers/chills, SOB, cough, sore throat, loss of taste/smell or sick contacts. Pt denies any HA, vision changes or RUQ pain. O:  Vitals:    03/07/23 1022   BP: 129/81   Pulse: 97     Gen: NAD  Abd: soft, nontender, gravid  Ext:  no edema    NST: 135, mod variability, accels, no decels. Category 1 FHTs, reactive. BP: 129/81  Weight: 177 lb (80.3 kg)  Heart Rate: 97  Patient Position: Sitting  Fetal HR: rnst  Movement: Present    A/P:  Patient Active Problem List    Diagnosis Date Noted    Type 2 diabetes mellitus ([facility] [address]      Priority: Medium    Needs flu shot 10/19/2022     Priority: Medium     Given 10/19/22      Multigravida of advanced maternal age in third trimester 08/30/2022     Priority: Medium    Use of letrozole (Femara) 08/30/2022     Priority: Medium     Conception on femara      PCOS (polycystic ovarian syndrome) 02/21/2022    Medication exposure during first trimester of pregnancy 02/09/2021     Topamax      RLTCS 11/9/17 F APG 8/9 wt ? 11/09/2017    Rh+/RI/GBS unk 08/23/2017    Type 2 DM- Class B 05/02/2017     TSH: WNL, HbA1c: 5.6, Pr:Cr WNL, EKG: pending  NST/BPPs @ 32 wks  Growth q 3-4 wks  Novolog 20/26/28; NPH  32 u    Uncontrolled - delivery at 38 wks        H/O C/S x4 04/25/2017     x4         Discussed updated COVID precautions and policies. Reviewed updated visitor policy. Encouraged social distancing and appropriate hand washing/hygiene practices. Reviewed symptoms suspicious for COVID infection. Discussed that ACOG, SMFM, and the CDC recommend to not withold immunization in pregnant and breastfeeding women who meet criteria for receipt of the vaccine based on the ACIP recommended priority groups. All questions answered. Patient vocalized understanding.     Soap & ERAS protocol given  Discussed s/sx that should prompt call to the office  Discussed caleb counts  RTC in 1 wks    Carine Hoffman MD

## 2024-12-26 NOTE — PROGRESS NOTES
diabetes mellitus     Migraine     Type 2 diabetes mellitus (HCC)      Past Surgical History:   Past Surgical History:   Procedure Laterality Date    CARPAL TUNNEL RELEASE Bilateral      SECTION      X3     SECTION N/A 2017     SECTION performed by Suzi Lewis MD at [facility] L[facility] OR     SECTION Bilateral 3/14/2023     SECTION performed by Suzi Lewis MD at [facility] L[facility] OR    TONSILLECTOMY       Obstetric History:   4  Para 5  Gynecologic History: LMP 22   Menarche 10  Duration 6-7 d    Interval q  30-35 d  Tampons/Pads in a day: 3-6  Last Pap: 22       Any history of abnormal paps no    PriorColpo/Biopsy n/a     Last Mammogram n/a  Contraception: Paraguard  Complications: none  STDs: none  Psychosocial History: Occupation:   stay at home   Caffeine Yes    At risk for depression No    Abuse:   No  Seatbelt:   Yes  Exercise:  No    Social History     Socioeconomic History    Marital status:      Spouse name: Not on file    Number of children: Not on file    Years of education: Not on file    Highest education level: Not on file   Occupational History    Not on file   Tobacco Use    Smoking status: Never    Smokeless tobacco: Never   Vaping Use    Vaping status: Never Used   Substance and Sexual Activity    Alcohol use: No    Drug use: No    Sexual activity: Yes     Partners: Male   Other Topics Concern    Not on file   Social History Narrative    Not on file     Social Determinants of Health     Financial Resource Strain: Not on file   Food Insecurity: No Food Insecurity (2023)    [facility]    Hunger Screening     Within the past 12 months we worried whether our food would run out before we got money to buy more.: Never True     Within the past 12 months the food we bought just didn't last and we didn't have money to get more.: Never True   Transportation Needs: Not on file   Physical

## 2024-12-30 ENCOUNTER — OFFICE VISIT (OUTPATIENT)
Dept: OBGYN CLINIC | Age: 34
End: 2024-12-30
Payer: MEDICAID

## 2024-12-30 VITALS
WEIGHT: 168 LBS | BODY MASS INDEX: 32.98 KG/M2 | SYSTOLIC BLOOD PRESSURE: 124 MMHG | HEART RATE: 83 BPM | DIASTOLIC BLOOD PRESSURE: 87 MMHG | HEIGHT: 60 IN

## 2024-12-30 DIAGNOSIS — L68.0 HIRSUTISM: ICD-10-CM

## 2024-12-30 DIAGNOSIS — R19.00 ABDOMINAL MASS, UNSPECIFIED ABDOMINAL LOCATION: ICD-10-CM

## 2024-12-30 DIAGNOSIS — Z01.419 ENCOUNTER FOR WELL WOMAN EXAM WITH ROUTINE GYNECOLOGICAL EXAM: Primary | ICD-10-CM

## 2024-12-30 PROCEDURE — 99395 PREV VISIT EST AGE 18-39: CPT | Performed by: OBSTETRICS & GYNECOLOGY

## 2024-12-30 PROCEDURE — 99459 PELVIC EXAMINATION: CPT | Performed by: OBSTETRICS & GYNECOLOGY

## 2024-12-30 RX ORDER — SPIRONOLACTONE 50 MG/1
50 TABLET, FILM COATED ORAL 2 TIMES DAILY
Qty: 180 TABLET | Refills: 1 | Status: SHIPPED | OUTPATIENT
Start: 2024-12-30

## 2024-12-30 ASSESSMENT — ENCOUNTER SYMPTOMS
COUGH: 0
ABDOMINAL PAIN: 1
BACK PAIN: 0
SHORTNESS OF BREATH: 0

## 2025-01-02 ENCOUNTER — HOSPITAL ENCOUNTER (OUTPATIENT)
Dept: ULTRASOUND IMAGING | Age: 35
Discharge: HOME | End: 2025-01-04
Attending: OBSTETRICS & GYNECOLOGY
Payer: MEDICAID

## 2025-01-02 DIAGNOSIS — R19.00 ABDOMINAL MASS, UNSPECIFIED ABDOMINAL LOCATION: ICD-10-CM

## 2025-01-02 PROCEDURE — 76857 US EXAM PELVIC LIMITED: CPT

## 2025-01-07 RX ORDER — CEPHALEXIN 500 MG/1
500 CAPSULE ORAL 4 TIMES DAILY
Qty: 20 CAPSULE | Refills: 0 | Status: SHIPPED | OUTPATIENT
Start: 2025-01-07 | End: 2025-01-12

## 2025-01-18 ENCOUNTER — HOSPITAL ENCOUNTER (EMERGENCY)
Facility: CLINIC | Age: 35
Discharge: HOME | End: 2025-01-18
Attending: STUDENT IN AN ORGANIZED HEALTH CARE EDUCATION/TRAINING PROGRAM
Payer: MEDICAID

## 2025-01-18 VITALS
HEIGHT: 60 IN | TEMPERATURE: 98.4 F | OXYGEN SATURATION: 98 % | HEART RATE: 92 BPM | RESPIRATION RATE: 16 BRPM | WEIGHT: 168 LBS | DIASTOLIC BLOOD PRESSURE: 90 MMHG | SYSTOLIC BLOOD PRESSURE: 141 MMHG | BODY MASS INDEX: 32.98 KG/M2

## 2025-01-18 DIAGNOSIS — R21 RASH AND OTHER NONSPECIFIC SKIN ERUPTION: Primary | ICD-10-CM

## 2025-01-18 PROCEDURE — 99283 EMERGENCY DEPT VISIT LOW MDM: CPT

## 2025-01-18 PROCEDURE — 6360000002 HC RX W HCPCS: Performed by: STUDENT IN AN ORGANIZED HEALTH CARE EDUCATION/TRAINING PROGRAM

## 2025-01-18 RX ORDER — CETIRIZINE HYDROCHLORIDE 10 MG/1
10 TABLET ORAL DAILY
Qty: 30 TABLET | Refills: 0 | Status: SHIPPED | OUTPATIENT
Start: 2025-01-18 | End: 2025-02-17

## 2025-01-18 RX ORDER — BENZOCAINE/MENTHOL 6 MG-10 MG
LOZENGE MUCOUS MEMBRANE
Qty: 56 G | Refills: 0 | Status: SHIPPED | OUTPATIENT
Start: 2025-01-18 | End: 2025-01-25

## 2025-01-18 RX ORDER — DEXAMETHASONE 4 MG/1
8 TABLET ORAL ONCE
Status: COMPLETED | OUTPATIENT
Start: 2025-01-18 | End: 2025-01-18

## 2025-01-18 RX ADMIN — DEXAMETHASONE 8 MG: 4 TABLET ORAL at 20:54

## 2025-01-18 ASSESSMENT — ENCOUNTER SYMPTOMS: VOMITING: 0

## 2025-01-18 ASSESSMENT — PAIN - FUNCTIONAL ASSESSMENT: PAIN_FUNCTIONAL_ASSESSMENT: 0-10

## 2025-01-18 ASSESSMENT — PAIN SCALES - GENERAL: PAINLEVEL_OUTOF10: 10

## 2025-01-18 ASSESSMENT — PAIN DESCRIPTION - DESCRIPTORS: DESCRIPTORS: ITCHING

## 2025-01-19 NOTE — DISCHARGE INSTRUCTIONS
We evaluated you for your rash.  As we discussed use the steroid cream.  We did give you a single dose of steroids here as well.    Use an allergy medicine as needed, consider taking Zyrtec.    Follow close with your primary doctor.  Additionally consider seeing an allergist.    Return to the emergency department if you develop any worsening or concerning symptoms.

## 2025-01-19 NOTE — ED PROVIDER NOTES
Pulse: 92   Resp: 16   Temp: 98.4 °F (36.9 °C)   SpO2: 98%   Weight: 76.2 kg (168 lb)   Height: 1.524 m (5')       Medical Decision Making  DDx: Rash    34-year-old female presents with concern for a rash.  States it is diffuse, started on her chest yesterday and then spread.  Using a topical steroid cream with some improvement.  Does not involve mucous membranes, palms or soles.  No fevers or chills.  Otherwise feeling okay.  Patient appears in no acute distress initial evaluation, afebrile, stable vital signs.  Heart regular rate and rhythm, lungs clear.  Does have fine raised rash along chest, back, upper extremities.  Mildly erythematous, blanchable.  Nontender.  Discussed with patient to start using some topical antihistamines.  Will give single dose of systemic steroid due to diffuseness of the rash.  Will prescribe topical steroids as well.  Will have patient follow closely with primary doctor, patient requesting information for an allergist, will prescribe.  Will discharge with close outpatient follow-up.  Patient and family in agreement with plan.    Risk  OTC drugs.  Prescription drug management.        DIAGNOSTIC RESULTS     LABS:  Labs Reviewed - No data to display    All other labs were within normal range or not returned as of this dictation.    RADIOLOGY:  No orders to display       EKG:  None      ED COURSE          CONSULTS:  None    PROCEDURES:  Procedures    Critical Care:  None    FINAL IMPRESSION      1. Rash and other nonspecific skin eruption          DISPOSITION/PLAN     DISPOSITION Decision To Discharge 01/18/2025 08:45:44 PM   DISPOSITION CONDITION Stable           PATIENT REFERRED TO:  [facility]  [address] [facility] [id number] [phone]  Go to   If symptoms worsen    Ryan Gates MD  [address] [facility] [address]  [facility] [address]  [phone]    Schedule an appointment as soon as possible for a visit   with allergist      DISCHARGE

## 2025-05-14 RX ORDER — SPIRONOLACTONE 50 MG/1
50 TABLET, FILM COATED ORAL 2 TIMES DAILY
Qty: 180 TABLET | Refills: 10 | Status: SHIPPED | OUTPATIENT
Start: 2025-05-14

## 2025-06-17 ENCOUNTER — HOSPITAL ENCOUNTER (OUTPATIENT)
Age: 35
Setting detail: SPECIMEN
Discharge: HOME | End: 2025-06-17

## 2025-06-17 ENCOUNTER — OFFICE VISIT (OUTPATIENT)
Dept: OBGYN CLINIC | Age: 35
End: 2025-06-17
Payer: MEDICAID

## 2025-06-17 VITALS
BODY MASS INDEX: 32.22 KG/M2 | WEIGHT: 165 LBS | HEART RATE: 82 BPM | SYSTOLIC BLOOD PRESSURE: 120 MMHG | DIASTOLIC BLOOD PRESSURE: 82 MMHG

## 2025-06-17 DIAGNOSIS — Z97.5 BREAKTHROUGH BLEEDING ASSOCIATED WITH INTRAUTERINE DEVICE (IUD): Primary | ICD-10-CM

## 2025-06-17 DIAGNOSIS — Z97.5 BREAKTHROUGH BLEEDING ASSOCIATED WITH INTRAUTERINE DEVICE (IUD): ICD-10-CM

## 2025-06-17 DIAGNOSIS — N92.1 BREAKTHROUGH BLEEDING ASSOCIATED WITH INTRAUTERINE DEVICE (IUD): ICD-10-CM

## 2025-06-17 DIAGNOSIS — N92.1 BREAKTHROUGH BLEEDING ASSOCIATED WITH INTRAUTERINE DEVICE (IUD): Primary | ICD-10-CM

## 2025-06-17 LAB
B-HCG SERPL EIA 3RD IS-ACNC: <0.2 MIU/ML
BASOPHILS # BLD: 0.03 K/UL (ref 0–0.2)
BASOPHILS NFR BLD: 0 % (ref 0–2)
EOSINOPHIL # BLD: 0.1 K/UL (ref 0–0.44)
EOSINOPHILS RELATIVE PERCENT: 1 % (ref 1–4)
ERYTHROCYTE [DISTWIDTH] IN BLOOD BY AUTOMATED COUNT: 13.8 % (ref 11.8–14.4)
HCT VFR BLD AUTO: 38.8 % (ref 36.3–47.1)
HGB BLD-MCNC: 12.3 G/DL (ref 11.9–15.1)
IMM GRANULOCYTES # BLD AUTO: 0.04 K/UL (ref 0–0.3)
IMM GRANULOCYTES NFR BLD: 0 %
LYMPHOCYTES NFR BLD: 2.57 K/UL (ref 1.1–3.7)
LYMPHOCYTES RELATIVE PERCENT: 28 % (ref 24–43)
MCH RBC QN AUTO: 26.3 PG (ref 25.2–33.5)
MCHC RBC AUTO-ENTMCNC: 31.7 G/DL (ref 28.4–34.8)
MCV RBC AUTO: 82.9 FL (ref 82.6–102.9)
MONOCYTES NFR BLD: 0.74 K/UL (ref 0.1–1.2)
MONOCYTES NFR BLD: 8 % (ref 3–12)
NEUTROPHILS NFR BLD: 63 % (ref 36–65)
NEUTS SEG NFR BLD: 5.83 K/UL (ref 1.5–8.1)
NRBC BLD-RTO: 0 PER 100 WBC
PLATELET # BLD AUTO: 353 K/UL (ref 138–453)
PMV BLD AUTO: 9.8 FL (ref 8.1–13.5)
PROLACTIN SERPL-MCNC: 16.4 NG/ML (ref 4.79–23.3)
RBC # BLD AUTO: 4.68 M/UL (ref 3.95–5.11)
TSH SERPL DL<=0.05 MIU/L-ACNC: 1.29 UIU/ML (ref 0.27–4.2)
WBC OTHER # BLD: 9.3 K/UL (ref 3.5–11.3)

## 2025-06-17 PROCEDURE — 99213 OFFICE O/P EST LOW 20 MIN: CPT | Performed by: STUDENT IN AN ORGANIZED HEALTH CARE EDUCATION/TRAINING PROGRAM

## 2025-06-17 NOTE — PROGRESS NOTES
[facility]  [facility]  [address]  [place] [address]      Problem Visit      Radha Solomon  2025                       Primary Care Physician: Margarita Aj MD    CC:   Chief Complaint   Patient presents with    Vaginal Bleeding     Lmp 25 and still bleeding has Paragard iud          HPI: Radha Solomon is a 35 y.o. female  Patient's last menstrual period was 2025.    The patient was seen and examined.     Patient was seen with the use of an .  Patient has been having irregular bleeding for the past month.  Patient has been bleeding almost daily since the middle of May.  Patient does have a copper IUD in place and has had regular cycles since it was placed in 2023.  Patient denies any new stressors in her life.  She is also complaining of unrelenting back pain.  Will collect some basic labs and an ultrasound to assess whether the IUD is appropriately positioned within the uterus.    Patient admits that sometimes her bleeding is heavier and other days she only notices it when she wipes.    REVIEW OF SYSTEMS:  Constitutional: negative fever, negative chills  HEENT: negative visual disturbances, negative headaches  Respiratory: negative dyspnea, negative cough  Cardiovascular: negative chest pain,  negative palpitations  Gastrointestinal: negative abdominal pain, negative RUQ pain, negative N/V, negative diarrhea, negative constipation  Genitourinary: negative dysuria, negative vaginal discharge, AUB with IUD  Dermatological: negative rash  Hematologic: negative bruising  Immunologic/Lymphatic: negative recent illness, negative recent sick contact  Musculoskeletal: negative back pain, negative myalgias, negative arthralgias  Neurological:  negative dizziness, negative weakness  Behavior/Psych: negative depression, negative anxiety    OBSTETRICAL HISTORY:  OB History    Para Term  AB Living   6 5 5  1 5   SAB IAB Ectopic

## 2025-06-18 ENCOUNTER — RESULTS FOLLOW-UP (OUTPATIENT)
Dept: OBGYN CLINIC | Age: 35
End: 2025-06-18

## 2025-06-24 ENCOUNTER — TELEPHONE (OUTPATIENT)
Dept: OBGYN CLINIC | Age: 35
End: 2025-06-24

## 2025-07-15 ENCOUNTER — OFFICE VISIT (OUTPATIENT)
Dept: OBGYN CLINIC | Age: 35
End: 2025-07-15
Payer: MEDICAID

## 2025-07-15 VITALS
SYSTOLIC BLOOD PRESSURE: 110 MMHG | DIASTOLIC BLOOD PRESSURE: 74 MMHG | WEIGHT: 176.6 LBS | BODY MASS INDEX: 34.67 KG/M2 | HEIGHT: 60 IN

## 2025-07-15 DIAGNOSIS — Z97.5 BREAKTHROUGH BLEEDING WITH IUD: Primary | ICD-10-CM

## 2025-07-15 DIAGNOSIS — N92.1 BREAKTHROUGH BLEEDING WITH IUD: Primary | ICD-10-CM

## 2025-07-15 DIAGNOSIS — E88.819 INSULIN RESISTANCE: ICD-10-CM

## 2025-07-15 DIAGNOSIS — L68.0 HIRSUTISM: ICD-10-CM

## 2025-07-15 PROCEDURE — 99212 OFFICE O/P EST SF 10 MIN: CPT | Performed by: OBSTETRICS & GYNECOLOGY

## 2025-07-15 RX ORDER — SPIRONOLACTONE 50 MG/1
50 TABLET, FILM COATED ORAL 2 TIMES DAILY
Qty: 180 TABLET | Refills: 1 | Status: SHIPPED | OUTPATIENT
Start: 2025-07-15

## 2025-07-15 NOTE — CONSULTS
Session ID: [id number]  Session Duration: Longer than 54 minutes  Language: Romanian   ID: #[id number]   Name: Elaine

## (undated) DEVICE — STERILE POLYISOPRENE POWDER-FREE SURGICAL GLOVES WITH EMOLLIENT COATING: Brand: PROTEXIS

## (undated) DEVICE — SUTURE MCRYL SZ 0 L36IN ABSRB VLT L48MM CTX 1/2 CIR Y398H

## (undated) DEVICE — TRAY SPNL 24GA L4IN PENCAN PNCL PNT NDL 0.75% BIPIVCAIN W/

## (undated) DEVICE — KENDALL SCD EXPRESS SLEEVES, KNEE LENGTH, MEDIUM: Brand: KENDALL SCD

## (undated) DEVICE — TOWEL SURG W16XL26IN WHT NONFENESTRATED ST 2 PER PK

## (undated) DEVICE — SUTURE VCRL 3-0 L36IN ABSRB VLT CT-1 L36MM 1/2 CIR J344H

## (undated) DEVICE — Z DUP USE 2522782 SOLUTION IRRIG 1000ML STRL H2O PLAS CONTAINER UROMATIC

## (undated) DEVICE — Z DISCONTINUED NO SUB IEDED STAPLER SKIN L39MM DIA0.53MM CRWN 5.7MM S STL FIX HD PROX

## (undated) DEVICE — SOLUTION SOD CHL 0.9% 1000ML

## (undated) DEVICE — STAPLER SKIN L39MM DIA0.53MM CRWN 5.7MM S STL FIX HD PROX